# Patient Record
Sex: FEMALE | Race: WHITE | Employment: FULL TIME | URBAN - METROPOLITAN AREA
[De-identification: names, ages, dates, MRNs, and addresses within clinical notes are randomized per-mention and may not be internally consistent; named-entity substitution may affect disease eponyms.]

---

## 2017-01-10 RX ORDER — QUETIAPINE FUMARATE 100 MG/1
TABLET, FILM COATED ORAL
Qty: 30 TABLET | Refills: 0 | Status: SHIPPED | OUTPATIENT
Start: 2017-01-10 | End: 2017-03-22 | Stop reason: ALTCHOICE

## 2017-01-31 ENCOUNTER — OFFICE VISIT (OUTPATIENT)
Dept: FAMILY MEDICINE CLINIC | Age: 47
End: 2017-01-31

## 2017-01-31 VITALS
BODY MASS INDEX: 31.95 KG/M2 | DIASTOLIC BLOOD PRESSURE: 82 MMHG | RESPIRATION RATE: 12 BRPM | SYSTOLIC BLOOD PRESSURE: 120 MMHG | HEART RATE: 82 BPM | WEIGHT: 204 LBS

## 2017-01-31 DIAGNOSIS — E66.9 OBESITY (BMI 30.0-34.9): ICD-10-CM

## 2017-01-31 DIAGNOSIS — G47.33 OBSTRUCTIVE SLEEP APNEA SYNDROME: Chronic | ICD-10-CM

## 2017-01-31 DIAGNOSIS — F41.9 ANXIETY: Chronic | ICD-10-CM

## 2017-01-31 PROCEDURE — 99214 OFFICE O/P EST MOD 30 MIN: CPT | Performed by: NURSE PRACTITIONER

## 2017-01-31 ASSESSMENT — ENCOUNTER SYMPTOMS
COUGH: 0
SHORTNESS OF BREATH: 0
DIARRHEA: 0
VOMITING: 0
NAUSEA: 0

## 2017-02-01 DIAGNOSIS — D64.9 ANEMIA, UNSPECIFIED TYPE: Primary | ICD-10-CM

## 2017-02-01 DIAGNOSIS — E66.9 OBESITY (BMI 30.0-34.9): ICD-10-CM

## 2017-02-01 LAB
A/G RATIO: 1.1 (ref 1.1–2.2)
ALBUMIN SERPL-MCNC: 3.9 G/DL (ref 3.4–5)
ALP BLD-CCNC: 96 U/L (ref 40–129)
ALT SERPL-CCNC: 15 U/L (ref 10–40)
ANION GAP SERPL CALCULATED.3IONS-SCNC: 13 MMOL/L (ref 3–16)
AST SERPL-CCNC: 15 U/L (ref 15–37)
BASOPHILS ABSOLUTE: 0 K/UL (ref 0–0.2)
BASOPHILS RELATIVE PERCENT: 0.6 %
BILIRUB SERPL-MCNC: 0.5 MG/DL (ref 0–1)
BUN BLDV-MCNC: 21 MG/DL (ref 7–20)
CALCIUM SERPL-MCNC: 9.2 MG/DL (ref 8.3–10.6)
CHLORIDE BLD-SCNC: 105 MMOL/L (ref 99–110)
CHOLESTEROL, TOTAL: 158 MG/DL (ref 0–199)
CO2: 26 MMOL/L (ref 21–32)
CREAT SERPL-MCNC: 0.7 MG/DL (ref 0.6–1.1)
EOSINOPHILS ABSOLUTE: 0.1 K/UL (ref 0–0.6)
EOSINOPHILS RELATIVE PERCENT: 1.9 %
FERRITIN: 9.4 NG/ML (ref 15–150)
FOLATE: 17.31 NG/ML (ref 4.78–24.2)
GFR AFRICAN AMERICAN: >60
GFR NON-AFRICAN AMERICAN: >60
GLOBULIN: 3.4 G/DL
GLUCOSE BLD-MCNC: 102 MG/DL (ref 70–99)
HCT VFR BLD CALC: 35.9 % (ref 36–48)
HDLC SERPL-MCNC: 59 MG/DL (ref 40–60)
HEMOGLOBIN: 11.1 G/DL (ref 12–16)
IRON SATURATION: 9 % (ref 15–50)
IRON: 33 UG/DL (ref 37–145)
LDL CHOLESTEROL CALCULATED: 90 MG/DL
LYMPHOCYTES ABSOLUTE: 2.1 K/UL (ref 1–5.1)
LYMPHOCYTES RELATIVE PERCENT: 33.2 %
MCH RBC QN AUTO: 23.6 PG (ref 26–34)
MCHC RBC AUTO-ENTMCNC: 31 G/DL (ref 31–36)
MCV RBC AUTO: 76.1 FL (ref 80–100)
MONOCYTES ABSOLUTE: 0.7 K/UL (ref 0–1.3)
MONOCYTES RELATIVE PERCENT: 10.6 %
NEUTROPHILS ABSOLUTE: 3.4 K/UL (ref 1.7–7.7)
NEUTROPHILS RELATIVE PERCENT: 53.7 %
PDW BLD-RTO: 19.7 % (ref 12.4–15.4)
PLATELET # BLD: 222 K/UL (ref 135–450)
PMV BLD AUTO: 9.9 FL (ref 5–10.5)
POTASSIUM SERPL-SCNC: 4.5 MMOL/L (ref 3.5–5.1)
RBC # BLD: 4.72 M/UL (ref 4–5.2)
SODIUM BLD-SCNC: 144 MMOL/L (ref 136–145)
TOTAL IRON BINDING CAPACITY: 367 UG/DL (ref 260–445)
TOTAL PROTEIN: 7.3 G/DL (ref 6.4–8.2)
TRIGL SERPL-MCNC: 44 MG/DL (ref 0–150)
TSH REFLEX: 1.2 UIU/ML (ref 0.27–4.2)
VITAMIN B-12: 484 PG/ML (ref 211–911)
VLDLC SERPL CALC-MCNC: 9 MG/DL
WBC # BLD: 6.4 K/UL (ref 4–11)

## 2017-02-02 DIAGNOSIS — D50.9 IRON DEFICIENCY ANEMIA, UNSPECIFIED IRON DEFICIENCY ANEMIA TYPE: Primary | ICD-10-CM

## 2017-02-02 RX ORDER — LANOLIN ALCOHOL/MO/W.PET/CERES
325 CREAM (GRAM) TOPICAL
Qty: 30 TABLET | Refills: 1 | Status: SHIPPED | OUTPATIENT
Start: 2017-02-02 | End: 2017-02-08 | Stop reason: SDUPTHER

## 2017-02-08 ENCOUNTER — OFFICE VISIT (OUTPATIENT)
Dept: FAMILY MEDICINE CLINIC | Age: 47
End: 2017-02-08

## 2017-02-08 VITALS
TEMPERATURE: 97.7 F | DIASTOLIC BLOOD PRESSURE: 66 MMHG | RESPIRATION RATE: 16 BRPM | HEIGHT: 67 IN | WEIGHT: 203 LBS | SYSTOLIC BLOOD PRESSURE: 114 MMHG | HEART RATE: 76 BPM | BODY MASS INDEX: 31.86 KG/M2

## 2017-02-08 DIAGNOSIS — Z13.220 SCREENING, LIPID: ICD-10-CM

## 2017-02-08 DIAGNOSIS — D50.9 IRON DEFICIENCY ANEMIA, UNSPECIFIED IRON DEFICIENCY ANEMIA TYPE: Primary | ICD-10-CM

## 2017-02-08 DIAGNOSIS — E04.9 GOITER: ICD-10-CM

## 2017-02-08 PROCEDURE — 99215 OFFICE O/P EST HI 40 MIN: CPT | Performed by: INTERNAL MEDICINE

## 2017-02-08 RX ORDER — LANOLIN ALCOHOL/MO/W.PET/CERES
325 CREAM (GRAM) TOPICAL 2 TIMES DAILY WITH MEALS
Qty: 30 TABLET | Refills: 1 | Status: SHIPPED | OUTPATIENT
Start: 2017-02-08 | End: 2017-06-21

## 2017-02-08 ASSESSMENT — ENCOUNTER SYMPTOMS
DIARRHEA: 0
WHEEZING: 0
ABDOMINAL PAIN: 1
SHORTNESS OF BREATH: 0
CONSTIPATION: 0

## 2017-02-15 ENCOUNTER — TELEPHONE (OUTPATIENT)
Dept: FAMILY MEDICINE CLINIC | Age: 47
End: 2017-02-15

## 2017-02-15 ENCOUNTER — HOSPITAL ENCOUNTER (OUTPATIENT)
Dept: ULTRASOUND IMAGING | Age: 47
Discharge: HOME | End: 2017-02-15
Attending: INTERNAL MEDICINE | Admitting: INTERNAL MEDICINE

## 2017-02-15 DIAGNOSIS — E04.1 THYROID NODULE: Primary | ICD-10-CM

## 2017-02-15 DIAGNOSIS — E04.9 NONTOXIC GOITER: ICD-10-CM

## 2017-02-15 DIAGNOSIS — E04.9 GOITER: ICD-10-CM

## 2017-02-27 ENCOUNTER — OFFICE VISIT (OUTPATIENT)
Dept: FAMILY MEDICINE CLINIC | Age: 47
End: 2017-02-27

## 2017-02-27 VITALS
TEMPERATURE: 97.6 F | HEIGHT: 67 IN | DIASTOLIC BLOOD PRESSURE: 66 MMHG | WEIGHT: 201 LBS | BODY MASS INDEX: 31.55 KG/M2 | SYSTOLIC BLOOD PRESSURE: 102 MMHG | HEART RATE: 64 BPM | RESPIRATION RATE: 16 BRPM

## 2017-02-27 DIAGNOSIS — E16.2 HYPOGLYCEMIA: ICD-10-CM

## 2017-02-27 DIAGNOSIS — R07.9 CHEST PAIN, UNSPECIFIED TYPE: Primary | ICD-10-CM

## 2017-02-27 DIAGNOSIS — R07.9 CHEST PAIN, UNSPECIFIED TYPE: ICD-10-CM

## 2017-02-27 DIAGNOSIS — R07.82 INTERCOSTAL PAIN: ICD-10-CM

## 2017-02-27 LAB — TROPONIN: <0.01 NG/ML

## 2017-02-27 PROCEDURE — 99214 OFFICE O/P EST MOD 30 MIN: CPT | Performed by: INTERNAL MEDICINE

## 2017-02-27 PROCEDURE — 93000 ELECTROCARDIOGRAM COMPLETE: CPT | Performed by: INTERNAL MEDICINE

## 2017-02-27 ASSESSMENT — ENCOUNTER SYMPTOMS
CONSTIPATION: 1
SHORTNESS OF BREATH: 0
DIARRHEA: 0
WHEEZING: 0

## 2017-02-28 ENCOUNTER — HOSPITAL ENCOUNTER (OUTPATIENT)
Dept: NON INVASIVE DIAGNOSTICS | Age: 47
Discharge: HOME | End: 2017-02-28
Attending: INTERNAL MEDICINE | Admitting: INTERNAL MEDICINE

## 2017-02-28 DIAGNOSIS — R07.9 CHEST PAIN, UNSPECIFIED TYPE: Primary | ICD-10-CM

## 2017-02-28 DIAGNOSIS — R07.9 CHEST PAIN: ICD-10-CM

## 2017-03-15 ENCOUNTER — OFFICE VISIT (OUTPATIENT)
Dept: FAMILY MEDICINE CLINIC | Age: 47
End: 2017-03-15

## 2017-03-15 VITALS
RESPIRATION RATE: 16 BRPM | WEIGHT: 205 LBS | BODY MASS INDEX: 32.18 KG/M2 | TEMPERATURE: 97.4 F | HEIGHT: 67 IN | HEART RATE: 68 BPM | SYSTOLIC BLOOD PRESSURE: 120 MMHG | DIASTOLIC BLOOD PRESSURE: 78 MMHG

## 2017-03-15 DIAGNOSIS — K63.5 COLON POLYP: ICD-10-CM

## 2017-03-15 DIAGNOSIS — Z13.220 SCREENING, LIPID: ICD-10-CM

## 2017-03-15 DIAGNOSIS — F41.9 ANXIETY: Chronic | ICD-10-CM

## 2017-03-15 DIAGNOSIS — Z00.00 PE (PHYSICAL EXAM), ANNUAL: Primary | ICD-10-CM

## 2017-03-15 DIAGNOSIS — D50.9 IRON DEFICIENCY ANEMIA, UNSPECIFIED IRON DEFICIENCY ANEMIA TYPE: ICD-10-CM

## 2017-03-15 LAB
BASOPHILS ABSOLUTE: 0 K/UL (ref 0–0.2)
BASOPHILS RELATIVE PERCENT: 0.4 %
CHOLESTEROL, TOTAL: 175 MG/DL (ref 0–199)
EOSINOPHILS ABSOLUTE: 0.1 K/UL (ref 0–0.6)
EOSINOPHILS RELATIVE PERCENT: 1.3 %
FERRITIN: 29.4 NG/ML (ref 15–150)
HCT VFR BLD CALC: 39.1 % (ref 36–48)
HDLC SERPL-MCNC: 62 MG/DL (ref 40–60)
HEMOGLOBIN: 12.1 G/DL (ref 12–16)
LDL CHOLESTEROL CALCULATED: 103 MG/DL
LYMPHOCYTES ABSOLUTE: 1.9 K/UL (ref 1–5.1)
LYMPHOCYTES RELATIVE PERCENT: 25.7 %
MCH RBC QN AUTO: 24.5 PG (ref 26–34)
MCHC RBC AUTO-ENTMCNC: 30.9 G/DL (ref 31–36)
MCV RBC AUTO: 79.5 FL (ref 80–100)
MONOCYTES ABSOLUTE: 0.7 K/UL (ref 0–1.3)
MONOCYTES RELATIVE PERCENT: 9.4 %
NEUTROPHILS ABSOLUTE: 4.6 K/UL (ref 1.7–7.7)
NEUTROPHILS RELATIVE PERCENT: 63.2 %
PDW BLD-RTO: 23.6 % (ref 12.4–15.4)
PLATELET # BLD: 203 K/UL (ref 135–450)
PMV BLD AUTO: 10.5 FL (ref 5–10.5)
RBC # BLD: 4.91 M/UL (ref 4–5.2)
TRIGL SERPL-MCNC: 52 MG/DL (ref 0–150)
VLDLC SERPL CALC-MCNC: 10 MG/DL
WBC # BLD: 7.3 K/UL (ref 4–11)

## 2017-03-15 PROCEDURE — 99396 PREV VISIT EST AGE 40-64: CPT | Performed by: INTERNAL MEDICINE

## 2017-03-15 ASSESSMENT — ENCOUNTER SYMPTOMS
NAUSEA: 0
EYE PAIN: 0
WHEEZING: 0
VOMITING: 0
DIARRHEA: 0
SHORTNESS OF BREATH: 1
COLOR CHANGE: 0
CONSTIPATION: 0

## 2017-03-22 ENCOUNTER — OFFICE VISIT (OUTPATIENT)
Dept: ENDOCRINOLOGY | Age: 47
End: 2017-03-22

## 2017-03-22 VITALS
HEART RATE: 57 BPM | TEMPERATURE: 97.5 F | SYSTOLIC BLOOD PRESSURE: 91 MMHG | WEIGHT: 206.8 LBS | RESPIRATION RATE: 16 BRPM | BODY MASS INDEX: 33.23 KG/M2 | HEIGHT: 66 IN | DIASTOLIC BLOOD PRESSURE: 59 MMHG | OXYGEN SATURATION: 98 %

## 2017-03-22 DIAGNOSIS — E04.1 THYROID NODULE: ICD-10-CM

## 2017-03-22 LAB
ANTI-THYROGLOB ABS: 14 IU/ML
THYROID PEROXIDASE (TPO) ABS: 16 IU/ML
TSH REFLEX: 0.78 UIU/ML (ref 0.27–4.2)

## 2017-03-22 PROCEDURE — 99204 OFFICE O/P NEW MOD 45 MIN: CPT | Performed by: INTERNAL MEDICINE

## 2017-03-22 ASSESSMENT — ENCOUNTER SYMPTOMS
NAUSEA: 0
DOUBLE VISION: 0
SORE THROAT: 0
SHORTNESS OF BREATH: 1
CONSTIPATION: 1
ABDOMINAL PAIN: 0
DIARRHEA: 0
BLURRED VISION: 0
VOMITING: 0

## 2017-03-29 ENCOUNTER — TELEPHONE (OUTPATIENT)
Dept: FAMILY MEDICINE CLINIC | Age: 47
End: 2017-03-29

## 2017-03-29 ENCOUNTER — OFFICE VISIT (OUTPATIENT)
Dept: CARDIOLOGY CLINIC | Age: 47
End: 2017-03-29

## 2017-03-29 VITALS
OXYGEN SATURATION: 99 % | DIASTOLIC BLOOD PRESSURE: 70 MMHG | BODY MASS INDEX: 33.24 KG/M2 | SYSTOLIC BLOOD PRESSURE: 104 MMHG | HEART RATE: 72 BPM | WEIGHT: 211.8 LBS | HEIGHT: 67 IN

## 2017-03-29 DIAGNOSIS — R07.9 CHEST PAIN, UNSPECIFIED TYPE: Primary | ICD-10-CM

## 2017-03-29 PROCEDURE — 99204 OFFICE O/P NEW MOD 45 MIN: CPT | Performed by: INTERNAL MEDICINE

## 2017-03-29 PROCEDURE — 93000 ELECTROCARDIOGRAM COMPLETE: CPT | Performed by: INTERNAL MEDICINE

## 2017-03-29 RX ORDER — METHOCARBAMOL 750 MG/1
750 TABLET, FILM COATED ORAL 3 TIMES DAILY
COMMUNITY
End: 2017-04-28 | Stop reason: ALTCHOICE

## 2017-03-29 RX ORDER — NAPROXEN 500 MG/1
500 TABLET ORAL 2 TIMES DAILY WITH MEALS
COMMUNITY
End: 2017-04-28 | Stop reason: ALTCHOICE

## 2017-04-19 ENCOUNTER — OFFICE VISIT (OUTPATIENT)
Dept: ORTHOPEDIC SURGERY | Age: 47
End: 2017-04-19

## 2017-04-19 VITALS
HEART RATE: 68 BPM | BODY MASS INDEX: 33.12 KG/M2 | DIASTOLIC BLOOD PRESSURE: 85 MMHG | SYSTOLIC BLOOD PRESSURE: 131 MMHG | HEIGHT: 67 IN | WEIGHT: 211 LBS

## 2017-04-19 DIAGNOSIS — M54.5 LOW BACK PAIN, UNSPECIFIED BACK PAIN LATERALITY, UNSPECIFIED CHRONICITY, WITH SCIATICA PRESENCE UNSPECIFIED: Primary | ICD-10-CM

## 2017-04-19 DIAGNOSIS — M51.36 DDD (DEGENERATIVE DISC DISEASE), LUMBAR: ICD-10-CM

## 2017-04-19 DIAGNOSIS — S39.012A LUMBAR STRAIN, INITIAL ENCOUNTER: ICD-10-CM

## 2017-04-19 PROCEDURE — 99214 OFFICE O/P EST MOD 30 MIN: CPT | Performed by: NURSE PRACTITIONER

## 2017-04-19 PROCEDURE — 72100 X-RAY EXAM L-S SPINE 2/3 VWS: CPT | Performed by: NURSE PRACTITIONER

## 2017-04-19 RX ORDER — METHYLPREDNISOLONE 4 MG/1
TABLET ORAL
Qty: 1 KIT | Refills: 0 | Status: SHIPPED | OUTPATIENT
Start: 2017-04-19 | End: 2017-04-28 | Stop reason: ALTCHOICE

## 2017-04-25 ENCOUNTER — OFFICE VISIT (OUTPATIENT)
Dept: ENDOCRINOLOGY | Age: 47
End: 2017-04-25

## 2017-04-25 VITALS
OXYGEN SATURATION: 98 % | WEIGHT: 208.2 LBS | RESPIRATION RATE: 16 BRPM | HEIGHT: 67 IN | DIASTOLIC BLOOD PRESSURE: 81 MMHG | SYSTOLIC BLOOD PRESSURE: 117 MMHG | BODY MASS INDEX: 32.68 KG/M2 | HEART RATE: 71 BPM

## 2017-04-25 DIAGNOSIS — E04.1 THYROID NODULE: Primary | ICD-10-CM

## 2017-04-25 PROCEDURE — 10022 PR FINE NEEDLE ASP;W/IMAGING GUIDANCE: CPT | Performed by: INTERNAL MEDICINE

## 2017-04-28 ENCOUNTER — TELEPHONE (OUTPATIENT)
Dept: ENDOCRINOLOGY | Age: 47
End: 2017-04-28

## 2017-04-28 ENCOUNTER — HOSPITAL ENCOUNTER (OUTPATIENT)
Dept: OTHER | Age: 47
Discharge: HOME | End: 2017-04-28
Attending: INTERNAL MEDICINE | Admitting: INTERNAL MEDICINE

## 2017-04-28 ENCOUNTER — OFFICE VISIT (OUTPATIENT)
Dept: FAMILY MEDICINE CLINIC | Age: 47
End: 2017-04-28

## 2017-04-28 VITALS
DIASTOLIC BLOOD PRESSURE: 80 MMHG | HEIGHT: 67 IN | RESPIRATION RATE: 16 BRPM | HEART RATE: 66 BPM | TEMPERATURE: 98.5 F | BODY MASS INDEX: 32.49 KG/M2 | SYSTOLIC BLOOD PRESSURE: 122 MMHG | WEIGHT: 207 LBS | OXYGEN SATURATION: 98 %

## 2017-04-28 DIAGNOSIS — S16.1XXA NECK STRAIN, INITIAL ENCOUNTER: Primary | ICD-10-CM

## 2017-04-28 DIAGNOSIS — S16.1XXA NECK STRAIN, INITIAL ENCOUNTER: ICD-10-CM

## 2017-04-28 DIAGNOSIS — D22.9 ATYPICAL NEVI: ICD-10-CM

## 2017-04-28 PROCEDURE — 99214 OFFICE O/P EST MOD 30 MIN: CPT | Performed by: INTERNAL MEDICINE

## 2017-04-28 RX ORDER — PREDNISONE 10 MG/1
TABLET ORAL
Qty: 34 TABLET | Refills: 0 | Status: SHIPPED | OUTPATIENT
Start: 2017-04-28 | End: 2017-05-10

## 2017-04-28 ASSESSMENT — ENCOUNTER SYMPTOMS
CONSTIPATION: 0
DIARRHEA: 0
WHEEZING: 0
SHORTNESS OF BREATH: 0

## 2017-05-05 ENCOUNTER — HOSPITAL ENCOUNTER (OUTPATIENT)
Dept: OTHER | Age: 47
Discharge: HOME | End: 2017-05-31
Attending: NURSE PRACTITIONER | Admitting: NURSE PRACTITIONER

## 2017-05-11 ENCOUNTER — HOSPITAL ENCOUNTER (OUTPATIENT)
Dept: PHYSICAL THERAPY | Age: 47
Discharge: HOME | End: 2017-05-12
Admitting: NURSE PRACTITIONER

## 2017-05-12 ENCOUNTER — HOSPITAL ENCOUNTER (OUTPATIENT)
Dept: PHYSICAL THERAPY | Age: 47
Discharge: HOME | End: 2017-05-13
Admitting: NURSE PRACTITIONER

## 2017-05-15 ENCOUNTER — HOSPITAL ENCOUNTER (OUTPATIENT)
Dept: PHYSICAL THERAPY | Age: 47
Discharge: HOME | End: 2017-05-16
Admitting: NURSE PRACTITIONER

## 2017-05-17 ENCOUNTER — HOSPITAL ENCOUNTER (OUTPATIENT)
Dept: PHYSICAL THERAPY | Age: 47
Discharge: HOME | End: 2017-05-18
Admitting: NURSE PRACTITIONER

## 2017-05-22 ENCOUNTER — HOSPITAL ENCOUNTER (OUTPATIENT)
Dept: PHYSICAL THERAPY | Age: 47
Discharge: HOME | End: 2017-05-23
Admitting: NURSE PRACTITIONER

## 2017-05-24 ENCOUNTER — HOSPITAL ENCOUNTER (OUTPATIENT)
Dept: PHYSICAL THERAPY | Age: 47
Discharge: HOME | End: 2017-05-25
Admitting: NURSE PRACTITIONER

## 2017-05-31 ENCOUNTER — HOSPITAL ENCOUNTER (OUTPATIENT)
Dept: PHYSICAL THERAPY | Age: 47
Discharge: HOME | End: 2017-06-01
Admitting: NURSE PRACTITIONER

## 2017-06-07 ENCOUNTER — OFFICE VISIT (OUTPATIENT)
Dept: ORTHOPEDIC SURGERY | Age: 47
End: 2017-06-07

## 2017-06-07 VITALS — HEIGHT: 67 IN | BODY MASS INDEX: 32.49 KG/M2 | WEIGHT: 207.01 LBS

## 2017-06-07 DIAGNOSIS — M51.36 DDD (DEGENERATIVE DISC DISEASE), LUMBAR: Primary | ICD-10-CM

## 2017-06-07 PROCEDURE — 99213 OFFICE O/P EST LOW 20 MIN: CPT | Performed by: NURSE PRACTITIONER

## 2017-06-07 RX ORDER — IBUPROFEN 600 MG/1
600 TABLET ORAL EVERY 6 HOURS PRN
Qty: 60 TABLET | Refills: 0 | Status: SHIPPED | OUTPATIENT
Start: 2017-06-07 | End: 2018-06-21

## 2017-06-19 ENCOUNTER — HOSPITAL ENCOUNTER (OUTPATIENT)
Dept: MRI IMAGING | Age: 47
Discharge: HOME | End: 2017-06-19
Attending: NURSE PRACTITIONER | Admitting: NURSE PRACTITIONER

## 2017-06-19 DIAGNOSIS — M51.36 OTHER INTERVERTEBRAL DISC DEGENERATION, LUMBAR REGION: ICD-10-CM

## 2017-06-19 DIAGNOSIS — M51.36 DDD (DEGENERATIVE DISC DISEASE), LUMBAR: ICD-10-CM

## 2017-06-21 ENCOUNTER — OFFICE VISIT (OUTPATIENT)
Dept: FAMILY MEDICINE CLINIC | Age: 47
End: 2017-06-21

## 2017-06-21 VITALS
DIASTOLIC BLOOD PRESSURE: 82 MMHG | SYSTOLIC BLOOD PRESSURE: 124 MMHG | WEIGHT: 210 LBS | OXYGEN SATURATION: 98 % | HEART RATE: 70 BPM | TEMPERATURE: 97.7 F | HEIGHT: 67 IN | RESPIRATION RATE: 16 BRPM | BODY MASS INDEX: 32.96 KG/M2

## 2017-06-21 DIAGNOSIS — E61.1 IRON DEFICIENCY: ICD-10-CM

## 2017-06-21 DIAGNOSIS — B35.3 TINEA PEDIS OF BOTH FEET: Primary | ICD-10-CM

## 2017-06-21 DIAGNOSIS — R68.89 ITCHY EYES: ICD-10-CM

## 2017-06-21 DIAGNOSIS — R63.5 WEIGHT GAIN: ICD-10-CM

## 2017-06-21 LAB
BASOPHILS ABSOLUTE: 0 K/UL (ref 0–0.2)
BASOPHILS RELATIVE PERCENT: 0.5 %
EOSINOPHILS ABSOLUTE: 0.1 K/UL (ref 0–0.6)
EOSINOPHILS RELATIVE PERCENT: 1 %
FERRITIN: 49.9 NG/ML (ref 15–150)
HCT VFR BLD CALC: 39.8 % (ref 36–48)
HEMOGLOBIN: 12.7 G/DL (ref 12–16)
IRON SATURATION: 18 % (ref 15–50)
IRON: 60 UG/DL (ref 37–145)
LYMPHOCYTES ABSOLUTE: 2.1 K/UL (ref 1–5.1)
LYMPHOCYTES RELATIVE PERCENT: 24.5 %
MCH RBC QN AUTO: 27.8 PG (ref 26–34)
MCHC RBC AUTO-ENTMCNC: 31.9 G/DL (ref 31–36)
MCV RBC AUTO: 87.2 FL (ref 80–100)
MONOCYTES ABSOLUTE: 0.8 K/UL (ref 0–1.3)
MONOCYTES RELATIVE PERCENT: 9.5 %
NEUTROPHILS ABSOLUTE: 5.4 K/UL (ref 1.7–7.7)
NEUTROPHILS RELATIVE PERCENT: 64.5 %
PDW BLD-RTO: 16.1 % (ref 12.4–15.4)
PLATELET # BLD: 194 K/UL (ref 135–450)
PMV BLD AUTO: 10.9 FL (ref 5–10.5)
RBC # BLD: 4.56 M/UL (ref 4–5.2)
TOTAL IRON BINDING CAPACITY: 328 UG/DL (ref 260–445)
WBC # BLD: 8.4 K/UL (ref 4–11)

## 2017-06-21 PROCEDURE — 99214 OFFICE O/P EST MOD 30 MIN: CPT | Performed by: INTERNAL MEDICINE

## 2017-06-21 RX ORDER — PRENATAL VIT 91/IRON/FOLIC/DHA 28-975-200
COMBINATION PACKAGE (EA) ORAL
Qty: 1 TUBE | Refills: 1 | Status: SHIPPED | OUTPATIENT
Start: 2017-06-21 | End: 2018-10-18 | Stop reason: ALTCHOICE

## 2017-06-21 ASSESSMENT — ENCOUNTER SYMPTOMS
WHEEZING: 0
NAUSEA: 0
DIARRHEA: 0
SHORTNESS OF BREATH: 0
VOMITING: 0
COLOR CHANGE: 1

## 2017-06-23 ENCOUNTER — OFFICE VISIT (OUTPATIENT)
Dept: ORTHOPEDIC SURGERY | Age: 47
End: 2017-06-23

## 2017-06-23 VITALS
SYSTOLIC BLOOD PRESSURE: 111 MMHG | WEIGHT: 207 LBS | HEART RATE: 68 BPM | DIASTOLIC BLOOD PRESSURE: 77 MMHG | HEIGHT: 66 IN | BODY MASS INDEX: 33.27 KG/M2

## 2017-06-23 DIAGNOSIS — M51.36 ANNULAR TEAR OF LUMBAR DISC: Primary | ICD-10-CM

## 2017-06-23 PROCEDURE — 99214 OFFICE O/P EST MOD 30 MIN: CPT | Performed by: NURSE PRACTITIONER

## 2017-08-02 ENCOUNTER — OFFICE VISIT (OUTPATIENT)
Dept: FAMILY MEDICINE CLINIC | Age: 47
End: 2017-08-02

## 2017-08-02 VITALS
HEIGHT: 66 IN | BODY MASS INDEX: 34.23 KG/M2 | SYSTOLIC BLOOD PRESSURE: 110 MMHG | RESPIRATION RATE: 16 BRPM | HEART RATE: 66 BPM | WEIGHT: 213 LBS | OXYGEN SATURATION: 98 % | DIASTOLIC BLOOD PRESSURE: 70 MMHG | TEMPERATURE: 98.5 F

## 2017-08-02 DIAGNOSIS — R63.5 WEIGHT GAIN: ICD-10-CM

## 2017-08-02 PROCEDURE — 99213 OFFICE O/P EST LOW 20 MIN: CPT | Performed by: INTERNAL MEDICINE

## 2017-08-02 ASSESSMENT — ENCOUNTER SYMPTOMS
DIARRHEA: 0
WHEEZING: 0
COUGH: 1
SHORTNESS OF BREATH: 0
CONSTIPATION: 0

## 2018-03-05 ENCOUNTER — TELEPHONE (OUTPATIENT)
Dept: SLEEP MEDICINE | Age: 48
End: 2018-03-05

## 2018-03-05 NOTE — TELEPHONE ENCOUNTER
I returned 140 Farzana Spencer phone call and left a message on their voicemail to call the office back at their earliest convenience to reschedule.

## 2018-03-14 ENCOUNTER — HOSPITAL ENCOUNTER (OUTPATIENT)
Dept: OTHER | Age: 48
Discharge: HOME | End: 2018-03-14
Attending: INTERNAL MEDICINE | Admitting: INTERNAL MEDICINE

## 2018-03-14 ENCOUNTER — OFFICE VISIT (OUTPATIENT)
Dept: FAMILY MEDICINE CLINIC | Age: 48
End: 2018-03-14

## 2018-03-14 VITALS
HEART RATE: 70 BPM | SYSTOLIC BLOOD PRESSURE: 120 MMHG | OXYGEN SATURATION: 96 % | HEIGHT: 66 IN | BODY MASS INDEX: 35.36 KG/M2 | WEIGHT: 220 LBS | RESPIRATION RATE: 16 BRPM | DIASTOLIC BLOOD PRESSURE: 80 MMHG

## 2018-03-14 DIAGNOSIS — R05.9 COUGH: Primary | ICD-10-CM

## 2018-03-14 DIAGNOSIS — R05.9 COUGH: ICD-10-CM

## 2018-03-14 DIAGNOSIS — L98.9 SKIN LESIONS: ICD-10-CM

## 2018-03-14 PROCEDURE — G8484 FLU IMMUNIZE NO ADMIN: HCPCS | Performed by: INTERNAL MEDICINE

## 2018-03-14 PROCEDURE — 1036F TOBACCO NON-USER: CPT | Performed by: INTERNAL MEDICINE

## 2018-03-14 PROCEDURE — G8427 DOCREV CUR MEDS BY ELIG CLIN: HCPCS | Performed by: INTERNAL MEDICINE

## 2018-03-14 PROCEDURE — G8417 CALC BMI ABV UP PARAM F/U: HCPCS | Performed by: INTERNAL MEDICINE

## 2018-03-14 PROCEDURE — 99214 OFFICE O/P EST MOD 30 MIN: CPT | Performed by: INTERNAL MEDICINE

## 2018-03-14 RX ORDER — AZITHROMYCIN 250 MG/1
TABLET, FILM COATED ORAL
Qty: 1 PACKET | Refills: 0 | Status: SHIPPED | OUTPATIENT
Start: 2018-03-14 | End: 2018-03-24

## 2018-03-14 ASSESSMENT — PATIENT HEALTH QUESTIONNAIRE - PHQ9
SUM OF ALL RESPONSES TO PHQ9 QUESTIONS 1 & 2: 3
2. FEELING DOWN, DEPRESSED OR HOPELESS: 2
SUM OF ALL RESPONSES TO PHQ QUESTIONS 1-9: 3
1. LITTLE INTEREST OR PLEASURE IN DOING THINGS: 1

## 2018-03-14 ASSESSMENT — ENCOUNTER SYMPTOMS
DIARRHEA: 1
WHEEZING: 1
SHORTNESS OF BREATH: 1
CONSTIPATION: 0

## 2018-03-14 NOTE — PROGRESS NOTES
mg/dL Final     Lab Results   Component Value Date    ALT 15 02/01/2017    AST 15 02/01/2017    ALKPHOS 96 02/01/2017    BILITOT 0.5 02/01/2017      Lab Results   Component Value Date    WBC 8.4 06/21/2017    HGB 12.7 06/21/2017    HCT 39.8 06/21/2017    MCV 87.2 06/21/2017     06/21/2017     TSH (uIU/mL)   Date Value   08/05/2015 0.94     Lab Results   Component Value Date    LABA1C 5.3 08/05/2015     No results found for: PSA, PSADIA     PHYSICAL EXAM:  /80 (Site: Right Arm, Position: Sitting, Cuff Size: Large Adult)   Pulse 70   Resp 16   Ht 5' 6\" (1.676 m)   Wt 220 lb (99.8 kg)   LMP 11/02/2016   SpO2 96%   BMI 35.51 kg/m²    Physical Exam   Constitutional: She appears well-developed and well-nourished. HENT:   Head: Normocephalic and atraumatic. Cardiovascular: Normal rate and regular rhythm. No murmur heard. Pulmonary/Chest: Breath sounds normal. She has no wheezes. Abdominal: Soft. There is no tenderness. Skin: Skin is warm and dry. Several large dk nevi on trunk  One above left breast very large  One in the right groin  Nevi  On the back  (few)   Psychiatric: She has a normal mood and affect. Her behavior is normal.     Wt Readings from Last 3 Encounters:   03/14/18 220 lb (99.8 kg)   08/02/17 213 lb (96.6 kg)   06/23/17 207 lb (93.9 kg)     BP Readings from Last 3 Encounters:   03/14/18 120/80   08/02/17 110/70   06/23/17 111/77       ASSESSMENT/PLAN:  Ny was seen today for cough. Diagnoses and all orders for this visit:    Cough  -     XR CHEST STANDARD (2 VW); Future    Skin lesions  -     Amb External Referral To Dermatology    Other orders  -     azithromycin (ZITHROMAX) 250 MG tablet; Take 2 tabs (500 mg) on Day 1, and take 1 tab (250 mg) on days 2 through 5.    says that skin lesions are new over the breast and in the ing canal  Should have whole body check  Ck with derm with ins.   [facility]  Ck cxr and zpac  Call if not getting better

## 2018-04-17 ENCOUNTER — HOSPITAL ENCOUNTER (OUTPATIENT)
Dept: MAMMOGRAPHY | Age: 48
Discharge: HOME | End: 2018-04-17
Attending: OBSTETRICS & GYNECOLOGY | Admitting: OBSTETRICS & GYNECOLOGY

## 2018-04-17 DIAGNOSIS — Z12.31 VISIT FOR SCREENING MAMMOGRAM: ICD-10-CM

## 2018-04-25 ENCOUNTER — INITIAL CONSULT (OUTPATIENT)
Dept: PULMONOLOGY | Age: 48
End: 2018-04-25

## 2018-04-25 VITALS
HEART RATE: 85 BPM | OXYGEN SATURATION: 98 % | WEIGHT: 227 LBS | BODY MASS INDEX: 35.63 KG/M2 | HEIGHT: 67 IN | DIASTOLIC BLOOD PRESSURE: 63 MMHG | SYSTOLIC BLOOD PRESSURE: 112 MMHG

## 2018-04-25 DIAGNOSIS — E66.9 NON MORBID OBESITY, UNSPECIFIED OBESITY TYPE: ICD-10-CM

## 2018-04-25 DIAGNOSIS — K21.9 GASTROESOPHAGEAL REFLUX DISEASE, ESOPHAGITIS PRESENCE NOT SPECIFIED: Chronic | ICD-10-CM

## 2018-04-25 DIAGNOSIS — F41.9 ANXIETY: Chronic | ICD-10-CM

## 2018-04-25 DIAGNOSIS — G47.33 OBSTRUCTIVE APNEA: Primary | Chronic | ICD-10-CM

## 2018-04-25 PROCEDURE — G8427 DOCREV CUR MEDS BY ELIG CLIN: HCPCS | Performed by: INTERNAL MEDICINE

## 2018-04-25 PROCEDURE — 1036F TOBACCO NON-USER: CPT | Performed by: INTERNAL MEDICINE

## 2018-04-25 PROCEDURE — 99214 OFFICE O/P EST MOD 30 MIN: CPT | Performed by: INTERNAL MEDICINE

## 2018-04-25 PROCEDURE — G8417 CALC BMI ABV UP PARAM F/U: HCPCS | Performed by: INTERNAL MEDICINE

## 2018-04-25 ASSESSMENT — SLEEP AND FATIGUE QUESTIONNAIRES
HOW LIKELY ARE YOU TO NOD OFF OR FALL ASLEEP WHILE SITTING INACTIVE IN A PUBLIC PLACE: 2
HOW LIKELY ARE YOU TO NOD OFF OR FALL ASLEEP WHILE LYING DOWN TO REST IN THE AFTERNOON WHEN CIRCUMSTANCES PERMIT: 1
HOW LIKELY ARE YOU TO NOD OFF OR FALL ASLEEP WHILE SITTING QUIETLY AFTER LUNCH WITHOUT ALCOHOL: 1
ESS TOTAL SCORE: 11
HOW LIKELY ARE YOU TO NOD OFF OR FALL ASLEEP WHILE SITTING AND TALKING TO SOMEONE: 1
HOW LIKELY ARE YOU TO NOD OFF OR FALL ASLEEP IN A CAR, WHILE STOPPED FOR A FEW MINUTES IN TRAFFIC: 1
HOW LIKELY ARE YOU TO NOD OFF OR FALL ASLEEP WHILE SITTING AND READING: 2
HOW LIKELY ARE YOU TO NOD OFF OR FALL ASLEEP WHILE WATCHING TV: 2
HOW LIKELY ARE YOU TO NOD OFF OR FALL ASLEEP WHEN YOU ARE A PASSENGER IN A CAR FOR AN HOUR WITHOUT A BREAK: 1

## 2018-04-25 ASSESSMENT — ENCOUNTER SYMPTOMS
RHINORRHEA: 0
SHORTNESS OF BREATH: 0
APNEA: 0
COUGH: 0
CHOKING: 0

## 2018-06-21 ENCOUNTER — TELEPHONE (OUTPATIENT)
Dept: FAMILY MEDICINE CLINIC | Age: 48
End: 2018-06-21

## 2018-09-26 ENCOUNTER — OFFICE VISIT (OUTPATIENT)
Dept: ORTHOPEDIC SURGERY | Age: 48
End: 2018-09-26
Payer: COMMERCIAL

## 2018-09-26 VITALS
DIASTOLIC BLOOD PRESSURE: 73 MMHG | HEIGHT: 67 IN | BODY MASS INDEX: 35.63 KG/M2 | WEIGHT: 227 LBS | SYSTOLIC BLOOD PRESSURE: 118 MMHG | HEART RATE: 64 BPM

## 2018-09-26 DIAGNOSIS — M51.9 ANNULAR TEAR: ICD-10-CM

## 2018-09-26 DIAGNOSIS — M54.5 LOW BACK PAIN, UNSPECIFIED BACK PAIN LATERALITY, UNSPECIFIED CHRONICITY, WITH SCIATICA PRESENCE UNSPECIFIED: Primary | ICD-10-CM

## 2018-09-26 PROBLEM — Z00.00 PE (PHYSICAL EXAM), ANNUAL: Status: RESOLVED | Noted: 2017-03-15 | Resolved: 2018-09-26

## 2018-09-26 PROCEDURE — G8417 CALC BMI ABV UP PARAM F/U: HCPCS | Performed by: NURSE PRACTITIONER

## 2018-09-26 PROCEDURE — G8427 DOCREV CUR MEDS BY ELIG CLIN: HCPCS | Performed by: NURSE PRACTITIONER

## 2018-09-26 PROCEDURE — 1036F TOBACCO NON-USER: CPT | Performed by: NURSE PRACTITIONER

## 2018-09-26 PROCEDURE — 99213 OFFICE O/P EST LOW 20 MIN: CPT | Performed by: NURSE PRACTITIONER

## 2018-10-08 ENCOUNTER — HOSPITAL ENCOUNTER (OUTPATIENT)
Dept: PHYSICAL THERAPY | Age: 48
Setting detail: THERAPIES SERIES
Discharge: HOME | End: 2018-10-08

## 2018-10-08 PROCEDURE — 97162 PT EVAL MOD COMPLEX 30 MIN: CPT

## 2018-10-08 PROCEDURE — 97530 THERAPEUTIC ACTIVITIES: CPT

## 2018-10-08 PROCEDURE — G8979 MOBILITY GOAL STATUS: HCPCS

## 2018-10-08 PROCEDURE — G8978 MOBILITY CURRENT STATUS: HCPCS

## 2018-10-08 ASSESSMENT — PAIN DESCRIPTION - PAIN TYPE: TYPE: CHRONIC PAIN

## 2018-10-08 ASSESSMENT — PAIN SCALES - GENERAL: PAINLEVEL_OUTOF10: 7

## 2018-10-08 ASSESSMENT — PAIN DESCRIPTION - LOCATION: LOCATION: BACK

## 2018-10-08 ASSESSMENT — PAIN DESCRIPTION - ORIENTATION: ORIENTATION: LOWER

## 2018-10-08 NOTE — FLOWSHEET NOTE
Physical Therapy Daily Treatment Note    Date:  10/8/2018    Patient Name:  Jesika Hernandez    :  1970  MRN: [id number]  Restrictions/Precautions:    Medical/Treatment Diagnosis Information:  · Diagnosis: annular tear  Insurance/Certification information:  PT Insurance Information: [id number]  Physician Information:  Referring Practitioner: AMARILIS Croft CNP  Plan of care signed (Y/N): N  Visit# / total visits:       G-Code (if applicable):         PT G-Codes  Functional Assessment Tool Used: Modified Oswestry  Score: 21  Functional Limitation: Mobility: Walking and moving around  Mobility: Walking and Moving Around Current Status (): At least 40 percent but less than 60 percent impaired, limited or restricted  Mobility: Walking and Moving Around Goal Status (): At least 20 percent but less than 40 percent impaired, limited or restricted    Time in:   9:45      Timed Treatment: 10 Total Treatment Time:  55  ________________________________________________________________________________________    Pain Level:    /10  SUBJECTIVE:  See eval    OBJECTIVE:     Exercise/Equipment Resistance/Repetitions Other comments   TA set Until control demo'd HEP                                                                                                                                      Other Therapeutic Activities:  Pt educated on POC and HEP. Pt given [facility] facilities. Pt instructed in use of locker room and aquatic facilities. Also instructed in rules and regulations of pool.     Manual Treatments:         Modalities:      Test/Measurements:  See eval       ASSESSMENT:     See eval    Treatment/Activity Tolerance:   [x] Patient tolerated treatment well [] Patient limited by fatique  [] Patient limited by pain [] Patient limited by other medical complications  [] Other:     Goals:    Short term goals  Time Frame for Short term goals: 6 weeks  Short term goal 1: pt will be
pending MD visit [] Discharge    See Weekly Progress Note: [] Yes  [] No  Next due:

## 2018-10-18 ENCOUNTER — APPOINTMENT (OUTPATIENT)
Dept: CT IMAGING | Age: 48
End: 2018-10-18

## 2018-10-18 ENCOUNTER — APPOINTMENT (OUTPATIENT)
Dept: GENERAL RADIOLOGY | Age: 48
End: 2018-10-18

## 2018-10-18 ENCOUNTER — HOSPITAL ENCOUNTER (EMERGENCY)
Age: 48
Discharge: HOME | End: 2018-10-18
Attending: EMERGENCY MEDICINE

## 2018-10-18 VITALS
BODY MASS INDEX: 32.96 KG/M2 | HEIGHT: 67 IN | WEIGHT: 210 LBS | HEART RATE: 71 BPM | OXYGEN SATURATION: 95 % | TEMPERATURE: 98 F | SYSTOLIC BLOOD PRESSURE: 107 MMHG | DIASTOLIC BLOOD PRESSURE: 82 MMHG | RESPIRATION RATE: 17 BRPM
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
## 2018-10-18 DIAGNOSIS — S63.502A LEFT WRIST SPRAIN, INITIAL ENCOUNTER: ICD-10-CM

## 2018-10-18 DIAGNOSIS — T07.XXXA MULTIPLE CONTUSIONS: ICD-10-CM

## 2018-10-18 PROCEDURE — 70450 CT HEAD/BRAIN W/O DYE: CPT

## 2018-10-18 PROCEDURE — 73110 X-RAY EXAM OF WRIST: CPT

## 2018-10-18 PROCEDURE — 6360000002 HC RX W HCPCS: Performed by: EMERGENCY MEDICINE

## 2018-10-18 PROCEDURE — 72125 CT NECK SPINE W/O DYE: CPT

## 2018-10-18 PROCEDURE — 71046 X-RAY EXAM CHEST 2 VIEWS: CPT

## 2018-10-18 PROCEDURE — 99284 EMERGENCY DEPT VISIT MOD MDM: CPT

## 2018-10-18 PROCEDURE — 74176 CT ABD & PELVIS W/O CONTRAST: CPT

## 2018-10-18 PROCEDURE — 96372 THER/PROPH/DIAG INJ SC/IM: CPT

## 2018-10-18 RX ORDER — KETOROLAC TROMETHAMINE 30 MG/ML
60 INJECTION, SOLUTION INTRAMUSCULAR; INTRAVENOUS ONCE
Status: COMPLETED | OUTPATIENT
Start: 2018-10-18 | End: 2018-10-18

## 2018-10-18 RX ORDER — IBUPROFEN 600 MG/1
600 TABLET ORAL EVERY 6 HOURS PRN
Qty: 20 TABLET | Refills: 0 | Status: SHIPPED | OUTPATIENT
Start: 2018-10-18 | End: 2019-06-21 | Stop reason: ALTCHOICE

## 2018-10-18 RX ORDER — METHOCARBAMOL 750 MG/1
750 TABLET, FILM COATED ORAL 3 TIMES DAILY PRN
Qty: 15 TABLET | Refills: 0 | Status: SHIPPED | OUTPATIENT
Start: 2018-10-18 | End: 2018-10-28

## 2018-10-18 RX ADMIN — KETOROLAC TROMETHAMINE 60 MG: 60 INJECTION, SOLUTION INTRAMUSCULAR at 13:49

## 2018-10-18 ASSESSMENT — PAIN SCALES - GENERAL
PAINLEVEL_OUTOF10: 10
PAINLEVEL_OUTOF10: 6

## 2018-10-18 ASSESSMENT — PAIN DESCRIPTION - PAIN TYPE: TYPE: ACUTE PAIN

## 2018-10-18 ASSESSMENT — PAIN DESCRIPTION - DESCRIPTORS: DESCRIPTORS: CONSTANT;THROBBING

## 2018-10-18 ASSESSMENT — PAIN DESCRIPTION - ORIENTATION: ORIENTATION: LEFT;RIGHT

## 2018-10-18 NOTE — ED PROVIDER NOTES
Triage Chief Complaint:   Fall (Pt c/o pain all over, Pt fell through a set of wooden stairs this morning. Pt denioes LOC. Pt states \" I went out the first step an fell throught the stair, I got hit in the head with a ladder and my left head went through some bricks. \" Pt denies LOC)    Craig:  Mirta Mcintyre is a 56 y.o. female that presents after fall. Patient states he stepped down the wooden steps and one of the legs gave way and she fell sideways states her hand on the left side, hit some bricks and she landed on her left side. She has been ambulatory since the episode and denies any loss of consciousness. She does complain of neck pain radiating up into her head and entire left side pain.   Hysterectomy    ROS:  General:  No fevers, no chills, no weakness  Eyes:  No recent vison changes, no discharge  ENT:  No sore throat, no nasal congestion, no hearing changes  Cardiovascular:  No chest pain, no palpitations  Respiratory:  No shortness of breath, no cough, no wheezing  Gastrointestinal:  No pain, no nausea, no vomiting, no diarrhea  Musculoskeletal:  No muscle pain, no joint pain  Skin:  No rash, no pruritis, no easy bruising  Neurologic:  No speech problems, no headache, no extremity numbness, no extremity tingling, no extremity weakness  Psychiatric:  No anxiety  Genitourinary:  No dysuria, no hematuria  Endocrine:  No unexpected weight gain, no unexpected weight loss  Extremities:  no edema, no pain    Past Medical History:   Diagnosis Date    Anxiety     Chronic back pain     Headache(784.0)     Hx of blood clots     march 2016-was on xarelto for 6months    Obstructive apnea     Seizures ([facility] [address]     states no longer  has them-last one was in 2013-june     Past Surgical History:   Procedure Laterality Date    KNEE SURGERY Right 2003    RIGHT KNEE    KNEE SURGERY Left 2012    LASIK Bilateral 2004    PARTIAL HYSTERECTOMY  11.4.16    Dr. Juana Barajas History Pain Edu? Excl. in [address]? My pulse ox interpretation is - normal    General appearance:  No acute distress. Head is normocephalic without any signs of trauma  Skin:  Warm. Dry. Eye:  Extraocular movements intact. Ears, nose, mouth and throat:  Oral mucosa moist   Neck:  Trachea midline. Minimal tenderness in the posterior cervical muscles. No crepitance step-offs or deformities are appreciated. No JVD  ExtremityTrace edema and slight lead decreased range of motion secondary to pain in the left wrist.  There is no abrasion and contusion in the left lateral volar wrist.  Multiple contusions in the lower extremities of various ages No other signs of trauma or injury. Patient is ambulatory  Heart:  Regular rate and rhythm,   Perfusion:  intact  Respiratory:  Lungs clear to auscultation bilaterally. Respirations nonlabored. Abdominal:  Normal bowel sounds. Soft. Nontender. Obese  Back:  No CVA tenderness to palpation midline is not tender. No crepitance step-offs or deformities are appreciated. Full range of motion    Neurological:  Alert and oriented times 3. No focal neuro deficits. Psychiatric:  Appropriate    I have reviewed and interpreted all of the currently available lab results from this visit (if applicable):  No results found for this visit on 10/18/18. Radiographs (if obtained):  [] The following radiograph was interpreted by myself in the absence of a radiologist:   [x] Radiologist's Report Reviewed:  XR WRIST LEFT (MIN 3 VIEWS)   Final Result   No acute osseous abnormality of the left wrist.         CT Cervical Spine WO Contrast   Final Result   1. Mild C5-C6 degenerative change otherwise unremarkable appearance of the   cervical spine with no acute abnormality. 2. Incidental right thyroid nodule as measured and described above, recommend   follow-up non emergent/outpatient ultrasound per follow-up guidelines below.       RECOMMENDATIONS:   Managing Incidental

## 2018-10-25 ENCOUNTER — HOSPITAL ENCOUNTER (OUTPATIENT)
Dept: PHYSICAL THERAPY | Age: 48
Setting detail: THERAPIES SERIES
Discharge: HOME | End: 2018-10-25

## 2018-10-30 ENCOUNTER — APPOINTMENT (OUTPATIENT)
Dept: PHYSICAL THERAPY | Age: 48
End: 2018-10-30

## 2018-11-24 ENCOUNTER — HOSPITAL ENCOUNTER (EMERGENCY)
Age: 48
Discharge: HOME | End: 2018-11-25
Attending: EMERGENCY MEDICINE

## 2018-11-24 DIAGNOSIS — M79.604 RIGHT LEG PAIN: Primary | ICD-10-CM

## 2018-11-24 LAB
A/G RATIO: 1 (ref 1.1–2.2)
ALBUMIN SERPL-MCNC: 3.8 G/DL (ref 3.4–5)
ALP BLD-CCNC: 114 U/L (ref 40–129)
ALT SERPL-CCNC: 17 U/L (ref 10–40)
ANION GAP SERPL CALCULATED.3IONS-SCNC: 10 MMOL/L (ref 3–16)
AST SERPL-CCNC: 23 U/L (ref 15–37)
BASOPHILS ABSOLUTE: 0.1 K/UL (ref 0–0.2)
BASOPHILS RELATIVE PERCENT: 0.8 %
BILIRUB SERPL-MCNC: 0.3 MG/DL (ref 0–1)
BUN BLDV-MCNC: 30 MG/DL (ref 7–20)
CALCIUM SERPL-MCNC: 9.3 MG/DL (ref 8.3–10.6)
CHLORIDE BLD-SCNC: 103 MMOL/L (ref 99–110)
CO2: 27 MMOL/L (ref 21–32)
CREAT SERPL-MCNC: 0.6 MG/DL (ref 0.6–1.1)
EOSINOPHILS ABSOLUTE: 0.4 K/UL (ref 0–0.6)
EOSINOPHILS RELATIVE PERCENT: 4.1 %
GFR AFRICAN AMERICAN: >60
GFR NON-AFRICAN AMERICAN: >60
GLOBULIN: 3.8 G/DL
GLUCOSE BLD-MCNC: 105 MG/DL (ref 70–99)
HCT VFR BLD CALC: 38.5 % (ref 36–48)
HEMOGLOBIN: 12.8 G/DL (ref 12–16)
LYMPHOCYTES ABSOLUTE: 2.6 K/UL (ref 1–5.1)
LYMPHOCYTES RELATIVE PERCENT: 28.6 %
MCH RBC QN AUTO: 28.8 PG (ref 26–34)
MCHC RBC AUTO-ENTMCNC: 33.4 G/DL (ref 31–36)
MCV RBC AUTO: 86.3 FL (ref 80–100)
MONOCYTES ABSOLUTE: 0.7 K/UL (ref 0–1.3)
MONOCYTES RELATIVE PERCENT: 8.1 %
NEUTROPHILS ABSOLUTE: 5.4 K/UL (ref 1.7–7.7)
NEUTROPHILS RELATIVE PERCENT: 58.4 %
PDW BLD-RTO: 15.4 % (ref 12.4–15.4)
PLATELET # BLD: 208 K/UL (ref 135–450)
PMV BLD AUTO: 9.7 FL (ref 5–10.5)
POTASSIUM SERPL-SCNC: 4.1 MMOL/L (ref 3.5–5.1)
RBC # BLD: 4.46 M/UL (ref 4–5.2)
SODIUM BLD-SCNC: 140 MMOL/L (ref 136–145)
TOTAL PROTEIN: 7.6 G/DL (ref 6.4–8.2)
WBC # BLD: 9.1 K/UL (ref 4–11)

## 2018-11-24 PROCEDURE — 6370000000 HC RX 637 (ALT 250 FOR IP): Performed by: EMERGENCY MEDICINE

## 2018-11-24 PROCEDURE — 99283 EMERGENCY DEPT VISIT LOW MDM: CPT

## 2018-11-24 PROCEDURE — 80053 COMPREHEN METABOLIC PANEL: CPT

## 2018-11-24 PROCEDURE — 85025 COMPLETE CBC W/AUTO DIFF WBC: CPT

## 2018-11-24 RX ORDER — METHOCARBAMOL 500 MG/1
500 TABLET, FILM COATED ORAL 4 TIMES DAILY
COMMUNITY
End: 2019-03-13 | Stop reason: ALTCHOICE

## 2018-11-24 RX ADMIN — RIVAROXABAN 15 MG: 15 TABLET, FILM COATED ORAL at 23:45

## 2018-11-24 ASSESSMENT — PAIN DESCRIPTION - LOCATION: LOCATION: LEG

## 2018-11-24 ASSESSMENT — PAIN DESCRIPTION - PAIN TYPE: TYPE: ACUTE PAIN

## 2018-11-24 ASSESSMENT — PAIN SCALES - GENERAL: PAINLEVEL_OUTOF10: 8

## 2018-11-24 ASSESSMENT — PAIN DESCRIPTION - ORIENTATION: ORIENTATION: RIGHT

## 2018-11-25 ENCOUNTER — HOSPITAL ENCOUNTER (OUTPATIENT)
Age: 48
Discharge: HOME | End: 2018-11-25

## 2018-11-25 VITALS
RESPIRATION RATE: 22 BRPM | TEMPERATURE: 97.8 F | OXYGEN SATURATION: 97 % | SYSTOLIC BLOOD PRESSURE: 97 MMHG | HEART RATE: 84 BPM | HEIGHT: 67 IN | WEIGHT: 215 LBS | DIASTOLIC BLOOD PRESSURE: 72 MMHG | BODY MASS INDEX: 33.74 KG/M2

## 2018-11-25 PROCEDURE — 93971 EXTREMITY STUDY: CPT

## 2018-11-25 ASSESSMENT — ENCOUNTER SYMPTOMS
DIARRHEA: 0
SORE THROAT: 0
SHORTNESS OF BREATH: 0
RHINORRHEA: 0
STRIDOR: 0
TROUBLE SWALLOWING: 0
VOMITING: 0
CHEST TIGHTNESS: 0
WHEEZING: 0
COUGH: 0
ABDOMINAL PAIN: 0

## 2018-11-25 NOTE — ED PROVIDER NOTES
physician with the below findings:      Interpretation per the Radiologist below, if available at the time of this note:    No orders to display         ED BEDSIDE ULTRASOUND:   Performed by ED Physician - none    LABS:  Labs Reviewed   COMPREHENSIVE METABOLIC PANEL - Abnormal; Notable for the following:        Result Value    Glucose 105 (*)     BUN 30 (*)     Albumin/Globulin Ratio 1.0 (*)     All other components within normal limits    Narrative:     Performed at:  [facility] [address],  [place], [facility]   Phone [phone]   CBC WITH AUTO DIFFERENTIAL    Narrative:     Performed at:  [facility] [address],  [place], [facility]   Phone [phone]       All other labs were within normal range ornot returned as of this dictation. EMERGENCY DEPARTMENT COURSE and DIFFERENTIAL DIAGNOSIS/MDM:   Vitals:    Vitals:    11/24/18 2241 11/24/18 2301 11/24/18 2331 11/25/18 0002   BP: 111/66 (!) 98/54 102/88 97/72   Pulse: 82 82 80 84   Resp: 25 19 26 22   Temp:       TempSrc:       SpO2: 95% 96% 99% 97%   Weight:       Height:             MDM    ED COURSE/MDM    -Marilu Ferris is a 53 y.o. female witHistory as noted above presents to ED for right eye pain and concerns of a repeat DVT. She was on Cymbalta for 6 months but is no longer.    -Well's score: 1  -Workup negative for any acute findings.  -As DVT ultrasound not available at this time will anticoagulate with 1 dose of xarelto and patient to follow-up tomorrow for DVT scan.  -Discussed plan to discharge with follow-up scan which will be relayed to ER physician who will call. Scan is positive and will call for further prescription for anticoagulation.    -Patient reports she is she does not have any health insurance and may not be able to afford Xarelto.   Patient was given trial booklet for Eliquis and if positive for DVT then to alert prescribing physician for preference

## 2018-12-11 ENCOUNTER — TELEPHONE (OUTPATIENT)
Dept: FAMILY MEDICINE CLINIC | Age: 48
End: 2018-12-11

## 2018-12-14 ENCOUNTER — OFFICE VISIT (OUTPATIENT)
Dept: FAMILY MEDICINE CLINIC | Age: 48
End: 2018-12-14
Payer: MEDICAID

## 2018-12-14 VITALS
SYSTOLIC BLOOD PRESSURE: 110 MMHG | WEIGHT: 216 LBS | BODY MASS INDEX: 33.83 KG/M2 | DIASTOLIC BLOOD PRESSURE: 70 MMHG | RESPIRATION RATE: 18 BRPM | HEART RATE: 82 BPM

## 2018-12-14 DIAGNOSIS — Z86.59 HISTORY OF ANXIETY: ICD-10-CM

## 2018-12-14 DIAGNOSIS — R56.9 PSEUDOSEIZURES (HCC): Primary | ICD-10-CM

## 2018-12-14 PROCEDURE — 99214 OFFICE O/P EST MOD 30 MIN: CPT | Performed by: NURSE PRACTITIONER

## 2018-12-14 NOTE — PROGRESS NOTES
12/14/2018    This is a 53 y.o. female   Chief Complaint   Patient presents with    Other     needs clearnace for work, has history/epilepsy   . HPI  Patient reports that she is going to start a new job at [employer] in [place] work. Needs clearance before she is able to start working due to her history of epilepsy. Reports that she was dx at age 12 with epilepsy. As an adult the only medication was depakote. Was on Depakote in 2014. Has seen neurologist at [facility]. Last seen in 2014. Reports that her last seizure was in 2013. Last seen Dr. Vipin Scott 5/14. 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 She was taken off depakote at that time due to lack of efficacy with pseudoseizures. In 2008 had EEG that revealed no epileptic activity. She reports that Dr. Lori Miranda in not currently in office and needs clearance to start work next week. Patient reports that she has a history of anxiety. Worse was in 2014. Last needed sertraline in 2017. Last seen therapist in 2015. Reports that she has not needed her medication since 2017. She reports that her anxiety has been controlled off medication since then. Denies depression.       Patient Active Problem List   Diagnosis    Left knee pain    Low back pain--prn pain meds (nsaids)    HNP (herniated nucleus pulposus), lumbar L5-S1--sees dr Ankita White Anxiety--on ssri and seroquel with help--does not see psychiatrist currently    GERD (gastroesophageal reflux disease)--off ppi currently--s/p egd 6/16--wnl    Obstructive apnea    Acute DVT of right tibial vein ([facility] [address]- started xarelto 3/9/16==saw dr moya-(hematol)-tx x 6 mo--feels provoked by dvt--rest of w/u pending post off meds    Colon polyp--on colo 6/16--dr wilcox    Pelvic pain in female    Adnexal mass    Iron deficiency anemia    Chest pain    Hypoglycemia    Thyroid nodule    Tinea pedis    Non morbid obesity, unspecified

## 2018-12-15 ASSESSMENT — ENCOUNTER SYMPTOMS
VOMITING: 0
CHEST TIGHTNESS: 0
CONSTIPATION: 0
DIARRHEA: 0
NAUSEA: 0
SHORTNESS OF BREATH: 0

## 2018-12-30 ENCOUNTER — TELEPHONE (OUTPATIENT)
Dept: OTHER | Age: 48
End: 2018-12-30

## 2019-03-13 ENCOUNTER — HOSPITAL ENCOUNTER (EMERGENCY)
Age: 49
Discharge: HOME | End: 2019-03-13

## 2019-03-13 VITALS
DIASTOLIC BLOOD PRESSURE: 81 MMHG | WEIGHT: 215 LBS | TEMPERATURE: 98.2 F | SYSTOLIC BLOOD PRESSURE: 122 MMHG | RESPIRATION RATE: 20 BRPM | BODY MASS INDEX: 33.74 KG/M2 | HEART RATE: 89 BPM | OXYGEN SATURATION: 99 % | HEIGHT: 67 IN

## 2019-03-13 DIAGNOSIS — L84 CALLUS OF FOOT: Primary | ICD-10-CM

## 2019-03-13 LAB
RAPID INFLUENZA  B AGN: NEGATIVE
RAPID INFLUENZA A AGN: NEGATIVE

## 2019-03-13 PROCEDURE — 87804 INFLUENZA ASSAY W/OPTIC: CPT

## 2019-03-13 PROCEDURE — 99282 EMERGENCY DEPT VISIT SF MDM: CPT

## 2019-03-13 ASSESSMENT — PAIN SCALES - GENERAL
PAINLEVEL_OUTOF10: 2
PAINLEVEL_OUTOF10: 7

## 2019-03-13 ASSESSMENT — PAIN DESCRIPTION - PAIN TYPE: TYPE: ACUTE PAIN

## 2019-03-13 ASSESSMENT — PAIN DESCRIPTION - LOCATION: LOCATION: FOOT

## 2019-04-18 ENCOUNTER — NURSE TRIAGE (OUTPATIENT)
Dept: OTHER | Facility: CLINIC | Age: 49
End: 2019-04-18

## 2019-04-18 NOTE — TELEPHONE ENCOUNTER
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 Patient reports pain at time of onset was severe and she was unable to walk and move normally. Patient reports previous history of lower back pain. Patient reports pain started to improve a few days after injury but has now worsened to the point of 10/10 pain. Patient reports at time of injury, pain radiated into bilateral upper legs, patient denies pain radiating to legs at this time. Patient denies numbness or tingling. Patient reports pain feels like muscle spasms. Patient reports she has taken ibuprofen and a muscle relaxer with no improvement in pain. Patient denies dysuria or change in urine color or smell. Writer instructed patient to go to [facility] facility for evaluation now and complete [facility] at [facility].

## 2019-04-27 ENCOUNTER — EMPLOYEE WELLNESS (OUTPATIENT)
Dept: OTHER | Age: 49
End: 2019-04-27

## 2019-04-27 LAB
CHOLESTEROL, TOTAL: 157 MG/DL (ref 0–199)
GLUCOSE BLD-MCNC: 96 MG/DL (ref 70–99)
HDLC SERPL-MCNC: 52 MG/DL (ref 40–60)
LDL CHOLESTEROL CALCULATED: 95 MG/DL
TRIGL SERPL-MCNC: 49 MG/DL (ref 0–150)

## 2019-05-05 ENCOUNTER — HOSPITAL ENCOUNTER (EMERGENCY)
Age: 49
Discharge: HOME | End: 2019-05-06
Attending: EMERGENCY MEDICINE
Payer: COMMERCIAL

## 2019-05-05 DIAGNOSIS — R10.9 ABDOMINAL PAIN, UNSPECIFIED ABDOMINAL LOCATION: ICD-10-CM

## 2019-05-05 DIAGNOSIS — R19.7 NAUSEA VOMITING AND DIARRHEA: Primary | ICD-10-CM

## 2019-05-05 DIAGNOSIS — R11.2 NAUSEA VOMITING AND DIARRHEA: Primary | ICD-10-CM

## 2019-05-05 LAB
A/G RATIO: 0.9 (ref 1.1–2.2)
ALBUMIN SERPL-MCNC: 3.8 G/DL (ref 3.4–5)
ALP BLD-CCNC: 97 U/L (ref 40–129)
ALT SERPL-CCNC: 20 U/L (ref 10–40)
ANION GAP SERPL CALCULATED.3IONS-SCNC: 9 MMOL/L (ref 3–16)
AST SERPL-CCNC: 35 U/L (ref 15–37)
BASOPHILS ABSOLUTE: 0 K/UL (ref 0–0.2)
BASOPHILS RELATIVE PERCENT: 0.2 %
BILIRUB SERPL-MCNC: 0.9 MG/DL (ref 0–1)
BILIRUBIN URINE: NEGATIVE
BLOOD, URINE: NEGATIVE
BUN BLDV-MCNC: 17 MG/DL (ref 7–20)
CALCIUM SERPL-MCNC: 9 MG/DL (ref 8.3–10.6)
CHLORIDE BLD-SCNC: 101 MMOL/L (ref 99–110)
CLARITY: CLEAR
CO2: 26 MMOL/L (ref 21–32)
COLOR: YELLOW
CREAT SERPL-MCNC: <0.5 MG/DL (ref 0.6–1.1)
EOSINOPHILS ABSOLUTE: 0.1 K/UL (ref 0–0.6)
EOSINOPHILS RELATIVE PERCENT: 0.9 %
GFR AFRICAN AMERICAN: >60
GFR NON-AFRICAN AMERICAN: >60
GLOBULIN: 4.3 G/DL
GLUCOSE BLD-MCNC: 105 MG/DL (ref 70–99)
GLUCOSE URINE: NEGATIVE MG/DL
HCT VFR BLD CALC: 42.3 % (ref 36–48)
HEMOGLOBIN: 14.2 G/DL (ref 12–16)
KETONES, URINE: NEGATIVE MG/DL
LEUKOCYTE ESTERASE, URINE: NEGATIVE
LIPASE: 26 U/L (ref 13–60)
LYMPHOCYTES ABSOLUTE: 0.9 K/UL (ref 1–5.1)
LYMPHOCYTES RELATIVE PERCENT: 11.4 %
MCH RBC QN AUTO: 28.9 PG (ref 26–34)
MCHC RBC AUTO-ENTMCNC: 33.7 G/DL (ref 31–36)
MCV RBC AUTO: 85.9 FL (ref 80–100)
MICROSCOPIC EXAMINATION: NORMAL
MONOCYTES ABSOLUTE: 0.5 K/UL (ref 0–1.3)
MONOCYTES RELATIVE PERCENT: 6.7 %
NEUTROPHILS ABSOLUTE: 6.2 K/UL (ref 1.7–7.7)
NEUTROPHILS RELATIVE PERCENT: 80.8 %
NITRITE, URINE: NEGATIVE
PDW BLD-RTO: 16.2 % (ref 12.4–15.4)
PH UA: 6 (ref 5–8)
PLATELET # BLD: 205 K/UL (ref 135–450)
PMV BLD AUTO: 9.4 FL (ref 5–10.5)
POTASSIUM SERPL-SCNC: 5.1 MMOL/L (ref 3.5–5.1)
PROTEIN UA: NEGATIVE MG/DL
RBC # BLD: 4.92 M/UL (ref 4–5.2)
SODIUM BLD-SCNC: 136 MMOL/L (ref 136–145)
SPECIFIC GRAVITY UA: 1.02 (ref 1–1.03)
TOTAL PROTEIN: 8.1 G/DL (ref 6.4–8.2)
URINE TYPE: NORMAL
UROBILINOGEN, URINE: 0.2 E.U./DL
WBC # BLD: 7.7 K/UL (ref 4–11)

## 2019-05-05 PROCEDURE — 96375 TX/PRO/DX INJ NEW DRUG ADDON: CPT

## 2019-05-05 PROCEDURE — 99284 EMERGENCY DEPT VISIT MOD MDM: CPT

## 2019-05-05 PROCEDURE — 85025 COMPLETE CBC W/AUTO DIFF WBC: CPT

## 2019-05-05 PROCEDURE — 81003 URINALYSIS AUTO W/O SCOPE: CPT

## 2019-05-05 PROCEDURE — 6360000002 HC RX W HCPCS

## 2019-05-05 PROCEDURE — 80053 COMPREHEN METABOLIC PANEL: CPT

## 2019-05-05 PROCEDURE — 6360000002 HC RX W HCPCS: Performed by: EMERGENCY MEDICINE

## 2019-05-05 PROCEDURE — 96374 THER/PROPH/DIAG INJ IV PUSH: CPT

## 2019-05-05 PROCEDURE — 83690 ASSAY OF LIPASE: CPT

## 2019-05-05 RX ORDER — ONDANSETRON 2 MG/ML
INJECTION INTRAMUSCULAR; INTRAVENOUS
Status: COMPLETED
Start: 2019-05-05 | End: 2019-05-05

## 2019-05-05 RX ORDER — KETOROLAC TROMETHAMINE 30 MG/ML
15 INJECTION, SOLUTION INTRAMUSCULAR; INTRAVENOUS ONCE
Status: COMPLETED | OUTPATIENT
Start: 2019-05-05 | End: 2019-05-05

## 2019-05-05 RX ORDER — HYDROCODONE BITARTRATE AND ACETAMINOPHEN 5; 325 MG/1; MG/1
1 TABLET ORAL EVERY 6 HOURS PRN
COMMUNITY
End: 2019-05-28

## 2019-05-05 RX ORDER — ONDANSETRON 2 MG/ML
4 INJECTION INTRAMUSCULAR; INTRAVENOUS ONCE
Status: COMPLETED | OUTPATIENT
Start: 2019-05-05 | End: 2019-05-05

## 2019-05-05 RX ORDER — KETOROLAC TROMETHAMINE 30 MG/ML
INJECTION, SOLUTION INTRAMUSCULAR; INTRAVENOUS
Status: DISCONTINUED
Start: 2019-05-05 | End: 2019-05-06 | Stop reason: HOSPADM

## 2019-05-05 RX ADMIN — ONDANSETRON 4 MG: 2 INJECTION INTRAMUSCULAR; INTRAVENOUS at 20:39

## 2019-05-05 RX ADMIN — KETOROLAC TROMETHAMINE 15 MG: 30 INJECTION, SOLUTION INTRAMUSCULAR at 21:18

## 2019-05-05 ASSESSMENT — PAIN DESCRIPTION - LOCATION
LOCATION: ABDOMEN

## 2019-05-05 ASSESSMENT — PAIN SCALES - GENERAL
PAINLEVEL_OUTOF10: 7
PAINLEVEL_OUTOF10: 5
PAINLEVEL_OUTOF10: 10
PAINLEVEL_OUTOF10: 7

## 2019-05-05 ASSESSMENT — PAIN DESCRIPTION - PAIN TYPE
TYPE: ACUTE PAIN

## 2019-05-05 ASSESSMENT — PAIN DESCRIPTION - DESCRIPTORS: DESCRIPTORS: CONSTANT

## 2019-05-05 ASSESSMENT — PAIN - FUNCTIONAL ASSESSMENT: PAIN_FUNCTIONAL_ASSESSMENT: 0-10

## 2019-05-05 ASSESSMENT — PAIN DESCRIPTION - FREQUENCY: FREQUENCY: CONTINUOUS

## 2019-05-05 ASSESSMENT — PAIN DESCRIPTION - ORIENTATION
ORIENTATION: MID;LOWER
ORIENTATION: MID;LOWER

## 2019-05-05 ASSESSMENT — PAIN DESCRIPTION - PROGRESSION: CLINICAL_PROGRESSION: NOT CHANGED

## 2019-05-06 ENCOUNTER — APPOINTMENT (OUTPATIENT)
Dept: CT IMAGING | Age: 49
End: 2019-05-06
Payer: COMMERCIAL

## 2019-05-06 VITALS
OXYGEN SATURATION: 93 % | HEIGHT: 66 IN | HEART RATE: 73 BPM | RESPIRATION RATE: 15 BRPM | WEIGHT: 220 LBS | TEMPERATURE: 98.2 F | DIASTOLIC BLOOD PRESSURE: 69 MMHG | BODY MASS INDEX: 35.36 KG/M2 | SYSTOLIC BLOOD PRESSURE: 105 MMHG

## 2019-05-06 PROCEDURE — 6360000004 HC RX CONTRAST MEDICATION: Performed by: EMERGENCY MEDICINE

## 2019-05-06 PROCEDURE — 74177 CT ABD & PELVIS W/CONTRAST: CPT

## 2019-05-06 RX ORDER — ONDANSETRON 4 MG/1
4 TABLET, ORALLY DISINTEGRATING ORAL 4 TIMES DAILY PRN
Qty: 15 TABLET | Refills: 0 | Status: SHIPPED | OUTPATIENT
Start: 2019-05-06 | End: 2019-05-20

## 2019-05-06 RX ADMIN — IOPAMIDOL 75 ML: 755 INJECTION, SOLUTION INTRAVENOUS at 00:37

## 2019-05-06 NOTE — ED PROVIDER NOTES
Emergency Physician Note    Chief Complaint  Abdominal Pain (x 2 days); Emesis; and Diarrhea (since 0500 today)       History of Present Illness  Ny Carter is a 53 y.o. female who presents to the ED for abdominal pain since around midnight. Patient reports that she's had vomiting and diarrhea several times today that are neither bloody nor black. Her abdominal pain is periumbilical and right-sided. She denies any fevers, chills or sweats or any chest pain or any urinary or vaginal symptoms. She's never had pain like this before. It began overnight. 10 systems reviewed, pertinent positives per HPI otherwise noted to be negative    I have reviewed the following from the nursing documentation:      Prior to Admission medications    Medication Sig Start Date End Date Taking? Authorizing Provider   HYDROcodone-acetaminophen (NORCO) 5-325 MG per tablet Take 1 tablet by mouth every 6 hours as needed for Pain. Yes Historical Provider, MD   Multiple Vitamins-Minerals (MULTIVITAMIN ADULT PO) Take 1 tablet by mouth daily   Yes Historical Provider, MD   ibuprofen (IBU) 600 MG tablet Take 1 tablet by mouth every 6 hours as needed for Pain 10/18/18 5/5/19 Yes eBtsy Orlando MD       Allergies as of 05/05/2019 - Review Complete 05/05/2019   Allergen Reaction Noted    Latex Rash 11/06/2012    Bee pollen Swelling 05/28/2015    Lime flavor [flavoring agent] Swelling 05/28/2015       Past Medical History:   Diagnosis Date    Anxiety     Chronic back pain     Headache(784.0)     Hx of blood clots     march 2016-was on xarelto for 6months    Obstructive apnea     Seizures ([facility] [address]     states no longer  has them-last one was in 2013-june        Surgical History:   Past Surgical History:   Procedure Laterality Date    KNEE SURGERY Right 2003    RIGHT KNEE    KNEE SURGERY Left 2012    LASIK Bilateral 2004    PARTIAL HYSTERECTOMY  11.4.16    Dr. Gregory Baig          Family History: Family History   Problem Relation Age of Onset    Heart Disease Father     Other Father         LILIAN    Diabetes Father     Alcohol Abuse Father     Obesity Father     Alzheimer's Disease Other         Grandparents    Breast Cancer Other         Aunt    Lung Cancer Other         Grandma    Colon Cancer Other         Grandpa    Diabetes Other         Grandma    Seizures Sister         Uncle, son       Social History     Socioeconomic History    Marital status: Single     Spouse name: Not on file    Number of children: Not on file    Years of education: Not on file    Highest education level: Not on file   Occupational History    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   Social Needs    Financial resource strain: Not on file    Food insecurity:     Worry: Not on file     Inability: Not on file    Transportation needs:     Medical: Not on file     Non-medical: Not on file   Tobacco Use    Smoking status: Former Smoker     Years: 1.00     Types: Cigarettes     Last attempt to quit: 1987     Years since quittin.5    Smokeless tobacco: Never Used    Tobacco comment: counseled on tobacco exposure avoidance   Substance and Sexual Activity    Alcohol use: No     Alcohol/week: 0.0 oz    Drug use: No    Sexual activity: Never   Lifestyle    Physical activity:     Days per week: Not on file     Minutes per session: Not on file    Stress: Not on file   Relationships    Social connections:     Talks on phone: Not on file     Gets together: Not on file     Attends Scientologist service: Not on file     Active member of club or organization: Not on file     Attends meetings of clubs or organizations: Not on file     Relationship status: Not on file    Intimate partner violence:     Fear of current or ex partner: Not on file     Emotionally abused: Not on file     Physically abused: Not on file     Forced sexual activity: Not on file   Other Topics Concern    Not on file   Social History Narrative    Not on file       Nursing notes reviewed. ED Triage Vitals [05/05/19 1941]   Enc Vitals Group      /75      Pulse 92      Resp 20      Temp 98.2 °F (36.8 °C)      Temp Source Oral      SpO2 99 %      Weight 220 lb (99.8 kg)      Height 5' 6\" (1.676 m)      Head Circumference       Peak Flow       Pain Score       Pain Loc       Pain Edu? Excl. in [address]? GENERAL:  Awake, alert. Well developed, well nourished with no apparent distress. HENT:  Normocephalic, Atraumatic, moist mucous membranes. EYES:  Pupils equal round and reactive to light, Conjunctiva normal, extraocular movements normal.  NECK:  No meningeal signs, Supple. CHEST:  Regular rate and rhythm, chest wall non-tender. LUNGS:  Clear to auscultation bilaterally, no respiratory distress. ABDOMEN:  Soft, moderate suprapubic and right lower quadrant and right upper quadrant tenderness, negative Dumont sign, no rebound, rigidity or guarding, non-distended, normal bowel sounds. No costovertebral angle tenderness to palpation. EXTREMITIES:  Normal range of motion, no edema, no tenderness, no deformity, distal pulses present. BACK:  No tenderness. SKIN: Warm, dry and intact. NEUROLOGIC: Normal mental status. Moving all extremities to command. LABS and DIAGNOSTIC RESULTS  RADIOLOGY  X-RAYS:  I have reviewed radiologic plain film image(s). ALL OTHER NON-PLAIN FILM IMAGES SUCH AS CT, ULTRASOUND AND MRI HAVE BEEN READ BY THE RADIOLOGIST. CT ABDOMEN PELVIS W IV CONTRAST Additional Contrast? None   Final Result   No acute process identified.               LABS  Labs Reviewed   CBC WITH AUTO DIFFERENTIAL - Abnormal; Notable for the following components:       Result Value    RDW 16.2 (*)     Lymphocytes # 0.9 (*)     All other components within normal limits    Narrative:     Performed at:  [facility] EM[facility]GENCY [facility]  [address],  [place], [address] [place] [address]   Phone [phone] METABOLIC PANEL - Abnormal; Notable for the following components:    Glucose 105 (*)     CREATININE <0.5 (*)     Albumin/Globulin Ratio 0.9 (*)     All other components within normal limits    Narrative:     Performed at:  [facility]  [address],  [place], [address]   Phone [phone]   LIPASE    Narrative:     Performed at:  [facility]  [address],  [place], [address]   Phone [phone]   URINALYSIS    Narrative:     Performed at:  [facility]  [address],  [place], [address]   Phone [phone]       [facility]       Patient improved with treatment in the ER. Advised return for new or worsening symptoms    I estimate there is LOW risk for ACUTE APPENDICITIS, BOWEL OBSTRUCTION, CHOLECYSTITIS, DIVERTICULITIS, INCARCERATED HERNIA, PANCREATITIS, or PERFORATED BOWEL or ULCER, thus I consider the discharge disposition reasonable. Also, there is no evidence or peritonitis, sepsis, or toxicity. Stephanie Lyons and I have discussed the diagnosis and risks, and we agree with discharging home to follow-up with their primary doctor. We also discussed returning to the Emergency Department immediately if new or worsening symptoms occur. We have discussed the symptoms which are most concerning (e.g., bloody stool, fever, changing or worsening pain, vomiting) that necessitate immediate return. FINAL Impression    1. Nausea vomiting and diarrhea    2. Abdominal pain, unspecified abdominal location        Blood pressure 105/69, pulse 73, temperature 98.2 °F (36.8 °C), temperature source Oral, resp. rate 15, height 5' 6\" (1.676 m), weight 220 lb (99.8 kg), last menstrual period 11/02/2016, SpO2 93 %, not currently breastfeeding. Patient was given scripts for the following medications. I counseled patient how to take these medications.   Discharge Medication List as of 5/6/2019  1:34 AM

## 2019-05-06 NOTE — ED NOTES
Patient reports started having stomach pains at [address]. Reports pain continued to this morning and throughout the day with diarrhea x2, body aches and vomiting. Reports currently nauseated.       [place], [facility]  05/05/19 0779

## 2019-05-28 ENCOUNTER — HOSPITAL ENCOUNTER (EMERGENCY)
Age: 49
Discharge: HOME | End: 2019-05-28
Payer: COMMERCIAL

## 2019-05-28 VITALS
HEART RATE: 77 BPM | TEMPERATURE: 98 F | RESPIRATION RATE: 16 BRPM | WEIGHT: 220 LBS | BODY MASS INDEX: 35.36 KG/M2 | OXYGEN SATURATION: 96 % | DIASTOLIC BLOOD PRESSURE: 82 MMHG | SYSTOLIC BLOOD PRESSURE: 125 MMHG | HEIGHT: 66 IN

## 2019-05-28 DIAGNOSIS — S16.1XXA STRAIN OF NECK MUSCLE, INITIAL ENCOUNTER: ICD-10-CM

## 2019-05-28 DIAGNOSIS — M54.50 ACUTE EXACERBATION OF CHRONIC LOW BACK PAIN: Primary | ICD-10-CM

## 2019-05-28 DIAGNOSIS — G89.29 ACUTE EXACERBATION OF CHRONIC LOW BACK PAIN: Primary | ICD-10-CM

## 2019-05-28 PROCEDURE — 96372 THER/PROPH/DIAG INJ SC/IM: CPT

## 2019-05-28 PROCEDURE — 99283 EMERGENCY DEPT VISIT LOW MDM: CPT

## 2019-05-28 PROCEDURE — 6370000000 HC RX 637 (ALT 250 FOR IP): Performed by: NURSE PRACTITIONER

## 2019-05-28 PROCEDURE — 6360000002 HC RX W HCPCS: Performed by: NURSE PRACTITIONER

## 2019-05-28 RX ORDER — HYDROCODONE BITARTRATE AND ACETAMINOPHEN 5; 325 MG/1; MG/1
1 TABLET ORAL ONCE
Status: COMPLETED | OUTPATIENT
Start: 2019-05-28 | End: 2019-05-28

## 2019-05-28 RX ORDER — LIDOCAINE 4 G/G
1 PATCH TOPICAL DAILY
Status: DISCONTINUED | OUTPATIENT
Start: 2019-05-28 | End: 2019-05-28 | Stop reason: HOSPADM

## 2019-05-28 RX ORDER — KETOROLAC TROMETHAMINE 30 MG/ML
30 INJECTION, SOLUTION INTRAMUSCULAR; INTRAVENOUS ONCE
Status: COMPLETED | OUTPATIENT
Start: 2019-05-28 | End: 2019-05-28

## 2019-05-28 RX ORDER — HYDROCODONE BITARTRATE AND ACETAMINOPHEN 5; 325 MG/1; MG/1
1 TABLET ORAL EVERY 8 HOURS PRN
Qty: 6 TABLET | Refills: 0 | Status: SHIPPED | OUTPATIENT
Start: 2019-05-28 | End: 2019-05-30

## 2019-05-28 RX ORDER — LIDOCAINE 50 MG/G
1 PATCH TOPICAL DAILY
Qty: 30 PATCH | Refills: 0 | Status: SHIPPED | OUTPATIENT
Start: 2019-05-28 | End: 2019-07-24 | Stop reason: ALTCHOICE

## 2019-05-28 RX ORDER — CYCLOBENZAPRINE HCL 10 MG
10 TABLET ORAL 3 TIMES DAILY PRN
Qty: 6 TABLET | Refills: 0 | Status: SHIPPED | OUTPATIENT
Start: 2019-05-28 | End: 2019-05-30

## 2019-05-28 RX ORDER — ORPHENADRINE CITRATE 30 MG/ML
60 INJECTION INTRAMUSCULAR; INTRAVENOUS ONCE
Status: COMPLETED | OUTPATIENT
Start: 2019-05-28 | End: 2019-05-28

## 2019-05-28 RX ADMIN — KETOROLAC TROMETHAMINE 30 MG: 30 INJECTION, SOLUTION INTRAMUSCULAR; INTRAVENOUS at 18:40

## 2019-05-28 RX ADMIN — HYDROCODONE BITARTRATE AND ACETAMINOPHEN 1 TABLET: 5; 325 TABLET ORAL at 19:39

## 2019-05-28 RX ADMIN — ORPHENADRINE CITRATE 60 MG: 60 INJECTION INTRAMUSCULAR; INTRAVENOUS at 18:40

## 2019-05-28 ASSESSMENT — PAIN SCALES - GENERAL
PAINLEVEL_OUTOF10: 7
PAINLEVEL_OUTOF10: 10
PAINLEVEL_OUTOF10: 10

## 2019-05-28 ASSESSMENT — PAIN DESCRIPTION - PAIN TYPE: TYPE: ACUTE PAIN

## 2019-05-28 ASSESSMENT — ENCOUNTER SYMPTOMS
SHORTNESS OF BREATH: 0
ABDOMINAL PAIN: 0
BACK PAIN: 1

## 2019-05-28 NOTE — ED PROVIDER NOTES
**[facility] [place] [facility]  EMERGENCY DEPARTMENT ENCOUNTER      Pt Name: Syed Perez  IOD:[id number]  Birthdate 1970  Date of evaluation: 5/28/2019  Provider: AMARILIS Hernandez CNP      Chief Complaint:    Chief Complaint   Patient presents with    Back Pain     patient states that she is having lower back pain that radiates down her legs. patient states that she hurt herself at work in April. patient states that she has been seeing occupational health. patient states the pain is going from her hips to her neck and then down her legs       Nursing Notes, Past Medical Hx, Past Surgical Hx, Social Hx, Allergies, and Family Hx were all reviewed and agreed with or any disagreements were addressed in the HPI.    HPI:  (Location, Duration, Timing, Severity,Quality, Assoc Sx, Context, Modifying factors)  This is a  53 y.o. female patient who presents to the emergency department with complaints of neck and back pain. Patient reports that she is seeing occupational health for a Worker's Comp. injury of her lower back. She reports that she is at work yesterday and denied any new injury but started with some pain through her neck and shoulders. The pain is shooting from her neck down her back. No numbness or tingling. No caudal anesthesia or bowel or bladder dysfunction. She has been taking Aleve without any relief. She has an upcoming appointment with occupational health on Thursday. She tried to go there today, however they were closed. Pain is currently a 10 out of 10. She is supposed to work tomorrow.     PastMedical/Surgical History:      Diagnosis Date    Anxiety     Chronic back pain     Headache(784.0)     Hx of blood clots     march 2016-was on xarelto for 6months    Obstructive apnea     Seizures ([facility] [address]     states no longer  has them-last one was in 2013-june         Procedure Laterality Date    KNEE SURGERY Right 2003    RIGHT KNEE symptoms. I discussed treatment plan with patient, patient is agreeable and denies questions at this time. She'll be given a work note through Thursday. The patient tolerated their visit well. I evaluated the patient. The physician was available for consultation as needed. The patient and / or the family were informed of the results of anytests, a time was given to answer questions, a plan was proposed and they agreed with plan. CLINICAL IMPRESSION:  1. Acute exacerbation of chronic low back pain    2. Strain of neck muscle, initial encounter        DISPOSITION Decision To Discharge 05/28/2019 07:10:12 PM      PATIENT REFERRED TO:  [place] [id number] [facility], [facility] [address] [place] [id number] [phone]    Go in 2 days  as scheduled      DISCHARGE MEDICATIONS:  New Prescriptions    CYCLOBENZAPRINE (FLEXERIL) 10 MG TABLET    Take 1 tablet by mouth 3 times daily as needed for Muscle spasms    HYDROCODONE-ACETAMINOPHEN (NORCO) 5-325 MG PER TABLET    Take 1 tablet by mouth every 8 hours as needed for Pain for up to 2 days. LIDOCAINE (LIDODERM) 5 %    Place 1 patch onto the skin daily 12 hours on, 12 hours off. DISCONTINUED MEDICATIONS:  Discontinued Medications    HYDROCODONE-ACETAMINOPHEN (NORCO) 5-325 MG PER TABLET    Take 1 tablet by mouth every 6 hours as needed for Pain. Attestation: The Prescription Monitoring Report for this patient was reviewed today.  (AMARILIS Gee CNP)      (Please note the MDM and HPI sections of this note were completed with a voice recognition program.  Efforts weremade to edit the dictations but occasionally words are mis-transcribed.)    Electronically signed, AMARILIS eGe CNP,        AMARILIS Gee CNP  05/28/19 1938

## 2019-06-13 ENCOUNTER — HOSPITAL ENCOUNTER (EMERGENCY)
Age: 49
Discharge: HOME | End: 2019-06-13
Payer: COMMERCIAL

## 2019-06-13 VITALS
OXYGEN SATURATION: 96 % | TEMPERATURE: 98.1 F | BODY MASS INDEX: 35.36 KG/M2 | SYSTOLIC BLOOD PRESSURE: 119 MMHG | WEIGHT: 220 LBS | HEIGHT: 66 IN | HEART RATE: 64 BPM | DIASTOLIC BLOOD PRESSURE: 85 MMHG | RESPIRATION RATE: 16 BRPM

## 2019-06-13 DIAGNOSIS — S39.012A STRAIN OF LUMBAR REGION, INITIAL ENCOUNTER: Primary | ICD-10-CM

## 2019-06-13 DIAGNOSIS — Z02.6 ENCOUNTER FOR ASSESSMENT OF WORK-RELATED CAUSATION OF INJURY: ICD-10-CM

## 2019-06-13 PROCEDURE — 99282 EMERGENCY DEPT VISIT SF MDM: CPT

## 2019-06-13 PROCEDURE — 96372 THER/PROPH/DIAG INJ SC/IM: CPT

## 2019-06-13 PROCEDURE — 97161 PT EVAL LOW COMPLEX 20 MIN: CPT

## 2019-06-13 PROCEDURE — 6360000002 HC RX W HCPCS: Performed by: NURSE PRACTITIONER

## 2019-06-13 PROCEDURE — 97140 MANUAL THERAPY 1/> REGIONS: CPT

## 2019-06-13 PROCEDURE — 96374 THER/PROPH/DIAG INJ IV PUSH: CPT

## 2019-06-13 RX ORDER — METHOCARBAMOL 750 MG/1
750 TABLET, FILM COATED ORAL 4 TIMES DAILY
COMMUNITY
End: 2019-06-13 | Stop reason: CLARIF

## 2019-06-13 RX ORDER — KETOROLAC TROMETHAMINE 30 MG/ML
15 INJECTION, SOLUTION INTRAMUSCULAR; INTRAVENOUS ONCE
Status: COMPLETED | OUTPATIENT
Start: 2019-06-13 | End: 2019-06-13

## 2019-06-13 RX ORDER — PREDNISONE 20 MG/1
20 TABLET ORAL DAILY
COMMUNITY
End: 2019-07-24 | Stop reason: ALTCHOICE

## 2019-06-13 RX ORDER — ORPHENADRINE CITRATE 30 MG/ML
60 INJECTION INTRAMUSCULAR; INTRAVENOUS ONCE
Status: COMPLETED | OUTPATIENT
Start: 2019-06-13 | End: 2019-06-13

## 2019-06-13 RX ORDER — ORPHENADRINE CITRATE 100 MG/1
100 TABLET, EXTENDED RELEASE ORAL 2 TIMES DAILY
Qty: 14 TABLET | Refills: 0 | Status: SHIPPED | OUTPATIENT
Start: 2019-06-13 | End: 2019-06-20

## 2019-06-13 RX ADMIN — KETOROLAC TROMETHAMINE 15 MG: 30 INJECTION, SOLUTION INTRAMUSCULAR at 13:56

## 2019-06-13 RX ADMIN — ORPHENADRINE CITRATE 60 MG: 30 INJECTION INTRAMUSCULAR; INTRAVENOUS at 13:56

## 2019-06-13 ASSESSMENT — PAIN SCALES - GENERAL
PAINLEVEL_OUTOF10: 1
PAINLEVEL_OUTOF10: 4
PAINLEVEL_OUTOF10: 2

## 2019-06-13 ASSESSMENT — ENCOUNTER SYMPTOMS
BACK PAIN: 1
NAUSEA: 0
VOMITING: 0

## 2019-06-13 NOTE — ED PROVIDER NOTES
**[facility]  [facility]  EMERGENCY DEPARTMENT ENCOUNTER      Pt Name: Jimi WALTERS:[id number]  [place] 1970  Date of evaluation: 6/13/2019  Provider: AMARILIS Cisneros CNP      Chief Complaint:    Chief Complaint   Patient presents with    Back Pain     mid to lower chronic working today light duty back spasms started up again       Nursing Notes, Past Medical Hx, Past Surgical Hx, Social Hx, Allergies, and Family Hx were all reviewed and agreed with or any disagreements were addressed in the HPI.    HPI:  (Location, Duration, Timing, Severity,Quality, Assoc Sx, Context, Modifying factors)  This is a  53 y.o. female who presents with left sided low back pain that started after vacuuming the stairwell at work today. States he pulled her low back in April and  Has had issues since and is seeing occupational therapy regarding this issue. States she takes Robaxin and prednisone. States while vacuuming today she got to the last step and the pain shot in her left lower back and it became tight. Denies any fall, numbness or tingling of lower extremities, recent surgeries, loss/retention of bowel or bladder or saddle anesthesia's. Denies any midline bone pain as well.  Rates pain a 4/10    PastMedical/Surgical History:      Diagnosis Date    Anxiety     Chronic back pain     Headache(784.0)     Hx of blood clots     march 2016-was on xarelto for 6months    Obstructive apnea     Seizures ([facility] [address]     states no longer  has them-last one was in 2013-june         Procedure Laterality Date    KNEE SURGERY Right 2003    RIGHT KNEE    KNEE SURGERY Left 2012    LASIK Bilateral 2004    PARTIAL HYSTERECTOMY  11.4.16    Dr. Britt Chan         Medications:  Discharge Medication List as of 6/13/2019  2:06 PM      CONTINUE these medications which have NOT CHANGED    Details   predniSONE (DELTASONE) 20 MG tablet Take 20 mg by mouth dailyHistorical Med      lidocaine (LIDODERM) Place 1 patch onto the skin nightlyHistorical Med      lidocaine (LIDODERM) 5 % Place 1 patch onto the skin daily 12 hours on, 12 hours off., Disp-30 patch, R-0Print      Multiple Vitamins-Minerals (MULTIVITAMIN ADULT PO) Take 1 tablet by mouth dailyHistorical Med      ibuprofen (IBU) 600 MG tablet Take 1 tablet by mouth every 6 hours as needed for Pain, Disp-20 tablet, R-0Print               Review of Systems:  Review of Systems   Constitutional: Negative. Gastrointestinal: Negative for nausea and vomiting. Musculoskeletal: Positive for back pain. Negative for neck pain and neck stiffness. Skin: Negative. Neurological: Negative for weakness and numbness. Positives and Pertinent negatives as per HPI. Except as noted above in the ROS, problem specific ROS was completed and is negative. Physical Exam:  Physical Exam   Constitutional: She is oriented to person, place, and time. She appears well-developed and well-nourished. No distress. HENT:   Head: Normocephalic and atraumatic. Neck: Normal range of motion. Cardiovascular: Normal rate, regular rhythm, normal heart sounds and intact distal pulses. Exam reveals no friction rub. No murmur heard. Pulmonary/Chest: Effort normal.   Musculoskeletal: She exhibits tenderness. She exhibits no edema or deformity. Lumbar back: She exhibits decreased range of motion and tenderness. She exhibits no bony tenderness. Back:    Lymphadenopathy:     She has no cervical adenopathy. Neurological: She is alert and oriented to person, place, and time. She displays normal reflexes. No cranial nerve deficit or sensory deficit. She exhibits normal muscle tone. Coordination normal.   Skin: Skin is warm and dry. Capillary refill takes less than 2 seconds. She is not diaphoretic. Psychiatric: She has a normal mood and affect.  Her behavior is normal. Judgment and thought content normal.       MEDICAL DECISION MAKING    Vitals:    Vitals:    06/13/19 1254 06/13/19 1414   BP: (!) 125/99 119/85   Pulse: 79 64   Resp: 16 16   Temp: 98.1 °F (36.7 °C)    TempSrc: Oral    SpO2: 95% 96%   Weight: 220 lb (99.8 kg)    Height: 5' 6\" (1.676 m)        LABS:Labs Reviewed - No data to display     Remainder of labs reviewed and werenegative at this time or not returned at the time of this note. RADIOLOGY:   Non-plain film images such as CT, Ultrasound and MRI are read by the radiologist. AMARILIS Rodriguez CNP have directly visualized the radiologic plain film image(s) with the below findings:        Interpretation per the Radiologist below, if available at the time of thisnote:    No orders to display        No results found. MEDICAL DECISION MAKING / ED COURSE:      PROCEDURES:   Procedures    None    Patient was given:  Medications   orphenadrine (NORFLEX) injection 60 mg (60 mg Intramuscular Given 6/13/19 1356)   ketorolac (TORADOL) injection 15 mg (15 mg Intravenous Given 6/13/19 1356)       Patient is alert and oriented x4. Skin is pwd, no cyanosis or pallor. Heart with RRR. Lumbar spine with left sided paraspinal pain. No midline pain with palpation. Bilateral lower extremities with equal strength. DTR's are normal.  Differentials: low back strain, low back pain, sciatica, fracture, herniated disc  Norflex and Toradol given  Diagnosis: Lumbar strain  Patient will be discharged with Norflex and instructed to discontinue the Robaxin and try this muscle relaxer instead. Follow up with PCP this week    The patient tolerated their visit well. I evaluated the patient. The physician was available for consultation as needed. The patient and / or the family were informed of the results of anytests, a time was given to answer questions, a plan was proposed and they agreed with plan. CLINICAL IMPRESSION:  1. Strain of lumbar region, initial encounter    2.  Encounter for assessment of work-related causation of injury        DISPOSITION Decision To Discharge 06/13/2019 02:05:08 PM      PATIENT REFERRED TO:  Romayne Brazen, MD [address]  [place] [address]  [phone]    Schedule an appointment as soon as possible for a visit in 3 days  For recheck    Steven Mirza, [address] [phone]    Schedule an appointment as soon as possible for a visit in 1 week  For recheck, As needed if pain continues    [facility]  ED  [address]  Go to   For new or worsening symptoms      DISCHARGE MEDICATIONS:  Discharge Medication List as of 6/13/2019  2:06 PM      START taking these medications    Details   orphenadrine (NORFLEX) 100 MG extended release tablet Take 1 tablet by mouth 2 times daily for 7 days, Disp-14 tablet, R-0Print             DISCONTINUED MEDICATIONS:  Discharge Medication List as of 6/13/2019  2:06 PM                 (Please note the MDM and HPI sections of this note were completed with a voice recognition program.  Efforts weremade to edit the dictations but occasionally words are mis-transcribed.)    Electronically signed, AMARILIS Hernandez CNP,        AMARILIS Hernandez CNP  06/13/19 Jose David Chavez, AMARILIS Pacheco CNP  06/19/19 0247

## 2019-06-13 NOTE — ED NOTES
LUMBOSACRAL PHYSICAL THERAPY EVALUATION  EMERGENCY DEPARTMENT     Received referral for Physical Therapy Evaluation in the Emergency Department. Pt educated to role of PT in the ED, and pt agreeable to proceed with evaluation. Evaluation Date: 6/13/2019     Patient Name: Cortez Hilton   YOB: 1970    Medical Diagnosis:  Back pain  Treatment Diagnosis:  L SI joint dysfunction  Onset Date:  6/13/19    Referral Date: 6/13/2019    Referring Provider: Stephany Gama NP  Restrictions/Precautions:        SUBJECTIVE FINDINGS       History of Present Illness:      Pt presents with c/o back pain. Notes that she was vacuuming stairs at work today when she felt a pain in her lower back. Pt notes that she just returned from lunch. Pt has a history of back pain. Notes that she started aquatic therapy last year but lost her insurance and could not continue. This is the second back injury at work this year. Notes that she is fighting worker's comp at this time. Notes that she is on restrictions from work. Notes that on a daily basis she normally has some level of back discomfort. Notes that she is having trouble completing her job. \"I would not have taken this job if I knew what I was going to have to do. \" Notes a flare up on Sunday night, as well. Pain       Patient describes pain to be warmth, uncomfortable    Patient reports 4/10 pain at present and  6/10 pain at its worst.  Worsened by movement   Improved by Rest, medication  Pt. reports pain with coughing, sneezing and laughing:  []   Yes [x]   No   Pt. reports bowel and bladder changes:   []   Yes [x]   No   Current Functional Limitations:    PLOF:    Pt's sleep is affected?    []   Yes [x]   No  []   N/A    OBJECTIVE FINDINGS       Imaging Results:    Posture    []   Forward head  []   Forward flexed trunk   []   Pronounced CT junction    []   Increased thoracic kyphosis   [x]   Increased lumbar lordosis   []   Scoliosis   []   Swayback  []   Decreased WB on   []   R   []   L   []   Scapular winging  []   Other:       Palpation     Patient reported tenderness with palpation  [x]   Yes   []   No   []   NT  Location:  Central lumbar spine  PT notes warmth  []   Yes   [x]   No   []   NT  Location:   PT notes increased muscle tone   []   Yes   [x]   No   []   NT  Location:   Ligament tenderness/provocation:   []   Yes   [x]   No   []   NT  Location:      Gait/Steps/Balance       Deviations on a level linoleum surface include WBOS, step-to sequence, shuffling    [x]  All balance WFL unless otherwise noted below:  Static/ Dynamic Sitting:  Static/Dynamic Standing:    Quick Tests/Functional Myotome Tests:     Heel Walk (L4): []   Able to perform WNL       []   Unable to perform         [x]   NT    Toe Walk (S1):  []   Able to perform WNL     []   Unable to perform     [x]   NT    SI Special Tests     SI Special Test Findings   Standing Landmarks [x]  Iliac Crests Equal   [] R High  [] L High  []  PSIS Equal   []  R High  [x]  L High     Standing Flexion Test []   WFL     []   Positive R []   Positive L   Supine Landmarks [x]  Iliac Crests Equal   []  R High   [] L High  []  ASIS Equal   [x]  R High  []  L High     Seated Flexion Test []   WFL     []   Positive R []   Positive L   Sit up Test/Long sit test []   NEG     [x]   Positive - Comment below: LLE longer     Sacral Sulci Test []   [facility]     []   Positive R []   Positive L   SI distraction []   NEG      []   Positive R []   Positive L     SI compression []   NEG     []   Positive R []   Positive L   Sacral thrust tests []   NEG      []   Positive R []   Positive L   Prone knee bend test []   NEG     []   Positive     []   NT  Comments:     Lumbar Special Tests     Lumbar Special Test Findings   Straight Leg Raise [x]   NEG    []   Positive R []   Positive L   Crams []   NEG    []   Positive R []   Positive L   Dural Tension/Slump test []   NEG    []   Positive R []   Positive L   Distraction []   NEG    []   Inc pain []   Dec pain   Compression []   NEG    []   Inc pain []   Dec pain     Lumbar Range of Motion/Strength Testing      [x] All WNL except as marked below  ROM (*denotes pain) AROM PROM COMMENTS   Flexion 50%     Extension 75%     Sidebending Left      Sidebending Right      Rotation Left    []   Seated  []   Standing       Rotation Right    []   Seated  []   Standing         Sensation/Motor Function   [x] All dermatomes WNL except as marked below   [x] All  myotomes WNL except as marked below      Dermatome Left Right Myotome Left Right   Anterior groin, 2-3 inches below ASIS (L1-L2)   Hip Flexion (L1-L2)     Middle third anterior thigh (L3)   Hip adduction (L3)     Patella and med malleolus (L4)   Knee extension (L3,4)     Fibular head and dorsum of foot (L5)   Ankle dorsiflexion (L4)     Lateral side and plantar surface of foot (S1)   Hip abduction (L5)     Medial aspect of posterior thigh (S2)   Great toe extension (L5)     Perianal area (S3,4)   Knee flexion (L5,S1)        Ankle plantarflexion (S1,2)       Reflexes/Trunk Strength    [x] All WNL except as marked below     Reflex Left Right Strength Strength   Quadriceps (L3,4)   Trunk Extensors    Achilles (S1,2)   Gluteals    Ankle clonus   Abdominals    Albin            TREATMENT  Educated on anatomy, physiology, and biomechanics of lumbar spine, SI joint, and movement dysfunction. Pt verbalized understanding and all of patient's questions answered to satisfaction. Business card and HEP issued. Manual tx: scissors MET for L AI, shotgun technique with cavitation, LLE inferior hip mobilization grade III/IV       Pt response to treatment: Pt responded well to treatment. Pt with increased mobility and improved gait sequence, with only minimal, chronic LBP symptoms. All acute symptoms diminished. ASSESSMENT     Pt presenting to ED with c/o LBP.  Through evaluation and examination, identified L sided SI joint dysfunction with associated leg length

## 2019-06-13 NOTE — ED NOTES
Mid to lower back pain. Pt states \"I hurt my back in April from working in [employer] here in the hospital I was cleaning the staircase today felt a spasm in my lower back I already take predisone and I have a Lidoderm patch on and I take a muscle relaxer\". Pt denies loss of Bowel Bladder control no abdominal pain. Pt denies numbness tingling in legs.       Stephanie Guzman LPN  43/14/43 0236

## 2019-06-13 NOTE — ED NOTES
Pt scripts x1 instructed to follow up with PCP/[facility]. Assessed per [facility] DOC.      Paris Snow LPN  32/04/56 1108

## 2019-06-21 ENCOUNTER — OFFICE VISIT (OUTPATIENT)
Dept: FAMILY MEDICINE CLINIC | Age: 49
End: 2019-06-21
Payer: COMMERCIAL

## 2019-06-21 ENCOUNTER — HOSPITAL ENCOUNTER (OUTPATIENT)
Dept: GENERAL RADIOLOGY | Age: 49
Discharge: HOME | End: 2019-06-21
Payer: COMMERCIAL

## 2019-06-21 ENCOUNTER — HOSPITAL ENCOUNTER (OUTPATIENT)
Age: 49
Discharge: HOME | End: 2019-06-21
Payer: COMMERCIAL

## 2019-06-21 VITALS
DIASTOLIC BLOOD PRESSURE: 70 MMHG | WEIGHT: 218 LBS | HEART RATE: 73 BPM | BODY MASS INDEX: 35.19 KG/M2 | SYSTOLIC BLOOD PRESSURE: 120 MMHG | RESPIRATION RATE: 18 BRPM

## 2019-06-21 DIAGNOSIS — M54.50 LOW BACK PAIN RADIATING TO LEFT LEG: ICD-10-CM

## 2019-06-21 DIAGNOSIS — M79.605 LOW BACK PAIN RADIATING TO LEFT LEG: ICD-10-CM

## 2019-06-21 DIAGNOSIS — S39.012A STRAIN OF LUMBAR REGION, INITIAL ENCOUNTER: Primary | ICD-10-CM

## 2019-06-21 DIAGNOSIS — S39.012A STRAIN OF LUMBAR REGION, INITIAL ENCOUNTER: ICD-10-CM

## 2019-06-21 PROCEDURE — 72100 X-RAY EXAM L-S SPINE 2/3 VWS: CPT

## 2019-06-21 PROCEDURE — 99214 OFFICE O/P EST MOD 30 MIN: CPT | Performed by: NURSE PRACTITIONER

## 2019-06-21 RX ORDER — NAPROXEN 500 MG/1
500 TABLET ORAL 2 TIMES DAILY PRN
Qty: 60 TABLET | Refills: 0 | Status: SHIPPED | OUTPATIENT
Start: 2019-06-21 | End: 2019-08-14

## 2019-06-21 ASSESSMENT — ENCOUNTER SYMPTOMS
BACK PAIN: 1
NAUSEA: 0
DIARRHEA: 0
VOMITING: 0
SHORTNESS OF BREATH: 0
COUGH: 0

## 2019-06-21 ASSESSMENT — PATIENT HEALTH QUESTIONNAIRE - PHQ9
1. LITTLE INTEREST OR PLEASURE IN DOING THINGS: 0
SUM OF ALL RESPONSES TO PHQ QUESTIONS 1-9: 0
SUM OF ALL RESPONSES TO PHQ9 QUESTIONS 1 & 2: 0
SUM OF ALL RESPONSES TO PHQ QUESTIONS 1-9: 0
2. FEELING DOWN, DEPRESSED OR HOPELESS: 0

## 2019-06-28 ENCOUNTER — HOSPITAL ENCOUNTER (EMERGENCY)
Age: 49
Discharge: HOME | End: 2019-06-28
Attending: EMERGENCY MEDICINE
Payer: COMMERCIAL

## 2019-06-28 VITALS
HEART RATE: 70 BPM | OXYGEN SATURATION: 99 % | WEIGHT: 220 LBS | SYSTOLIC BLOOD PRESSURE: 110 MMHG | TEMPERATURE: 97.5 F | HEIGHT: 66 IN | RESPIRATION RATE: 12 BRPM | DIASTOLIC BLOOD PRESSURE: 84 MMHG | BODY MASS INDEX: 35.36 KG/M2

## 2019-06-28 DIAGNOSIS — R51.9 NONINTRACTABLE HEADACHE, UNSPECIFIED CHRONICITY PATTERN, UNSPECIFIED HEADACHE TYPE: Primary | ICD-10-CM

## 2019-06-28 LAB
A/G RATIO: 0.9 (ref 1.1–2.2)
ALBUMIN SERPL-MCNC: 3.5 G/DL (ref 3.4–5)
ALP BLD-CCNC: 86 U/L (ref 40–129)
ALT SERPL-CCNC: 28 U/L (ref 10–40)
ANION GAP SERPL CALCULATED.3IONS-SCNC: 10 MMOL/L (ref 3–16)
AST SERPL-CCNC: 24 U/L (ref 15–37)
BASOPHILS ABSOLUTE: 0.1 K/UL (ref 0–0.2)
BASOPHILS RELATIVE PERCENT: 0.7 %
BILIRUB SERPL-MCNC: 0.5 MG/DL (ref 0–1)
BUN BLDV-MCNC: 29 MG/DL (ref 7–20)
CALCIUM SERPL-MCNC: 9.2 MG/DL (ref 8.3–10.6)
CHLORIDE BLD-SCNC: 103 MMOL/L (ref 99–110)
CO2: 27 MMOL/L (ref 21–32)
CREAT SERPL-MCNC: 0.6 MG/DL (ref 0.6–1.1)
EOSINOPHILS ABSOLUTE: 0.4 K/UL (ref 0–0.6)
EOSINOPHILS RELATIVE PERCENT: 5.3 %
GFR AFRICAN AMERICAN: >60
GFR NON-AFRICAN AMERICAN: >60
GLOBULIN: 3.8 G/DL
GLUCOSE BLD-MCNC: 99 MG/DL (ref 70–99)
HCT VFR BLD CALC: 38.6 % (ref 36–48)
HEMOGLOBIN: 12.9 G/DL (ref 12–16)
LYMPHOCYTES ABSOLUTE: 2.1 K/UL (ref 1–5.1)
LYMPHOCYTES RELATIVE PERCENT: 29.5 %
MCH RBC QN AUTO: 29 PG (ref 26–34)
MCHC RBC AUTO-ENTMCNC: 33.5 G/DL (ref 31–36)
MCV RBC AUTO: 86.6 FL (ref 80–100)
MONOCYTES ABSOLUTE: 0.7 K/UL (ref 0–1.3)
MONOCYTES RELATIVE PERCENT: 9.5 %
NEUTROPHILS ABSOLUTE: 4 K/UL (ref 1.7–7.7)
NEUTROPHILS RELATIVE PERCENT: 55 %
PDW BLD-RTO: 15.7 % (ref 12.4–15.4)
PLATELET # BLD: 169 K/UL (ref 135–450)
PMV BLD AUTO: 9.3 FL (ref 5–10.5)
POTASSIUM SERPL-SCNC: 4.3 MMOL/L (ref 3.5–5.1)
RBC # BLD: 4.46 M/UL (ref 4–5.2)
SODIUM BLD-SCNC: 140 MMOL/L (ref 136–145)
TOTAL PROTEIN: 7.3 G/DL (ref 6.4–8.2)
WBC # BLD: 7.2 K/UL (ref 4–11)

## 2019-06-28 PROCEDURE — 2580000003 HC RX 258: Performed by: NURSE PRACTITIONER

## 2019-06-28 PROCEDURE — 99283 EMERGENCY DEPT VISIT LOW MDM: CPT

## 2019-06-28 PROCEDURE — 6370000000 HC RX 637 (ALT 250 FOR IP): Performed by: NURSE PRACTITIONER

## 2019-06-28 PROCEDURE — 96375 TX/PRO/DX INJ NEW DRUG ADDON: CPT

## 2019-06-28 PROCEDURE — 96361 HYDRATE IV INFUSION ADD-ON: CPT

## 2019-06-28 PROCEDURE — 6360000002 HC RX W HCPCS: Performed by: NURSE PRACTITIONER

## 2019-06-28 PROCEDURE — 96374 THER/PROPH/DIAG INJ IV PUSH: CPT

## 2019-06-28 PROCEDURE — 85025 COMPLETE CBC W/AUTO DIFF WBC: CPT

## 2019-06-28 PROCEDURE — 80053 COMPREHEN METABOLIC PANEL: CPT

## 2019-06-28 RX ORDER — KETOROLAC TROMETHAMINE 30 MG/ML
30 INJECTION, SOLUTION INTRAMUSCULAR; INTRAVENOUS ONCE
Status: COMPLETED | OUTPATIENT
Start: 2019-06-28 | End: 2019-06-28

## 2019-06-28 RX ORDER — HYDROCODONE BITARTRATE AND ACETAMINOPHEN 5; 325 MG/1; MG/1
1 TABLET ORAL EVERY 6 HOURS PRN
COMMUNITY
End: 2019-07-24 | Stop reason: ALTCHOICE

## 2019-06-28 RX ORDER — ORPHENADRINE CITRATE 100 MG/1
100 TABLET, EXTENDED RELEASE ORAL 2 TIMES DAILY
COMMUNITY
End: 2019-07-24 | Stop reason: ALTCHOICE

## 2019-06-28 RX ORDER — ACETAMINOPHEN, ASPIRIN AND CAFFEINE 250; 250; 65 MG/1; MG/1; MG/1
1 TABLET, FILM COATED ORAL EVERY 6 HOURS PRN
Qty: 90 TABLET | Refills: 0 | Status: SHIPPED | OUTPATIENT
Start: 2019-06-28 | End: 2020-03-02

## 2019-06-28 RX ORDER — DIPHENHYDRAMINE HYDROCHLORIDE 50 MG/ML
25 INJECTION INTRAMUSCULAR; INTRAVENOUS ONCE
Status: COMPLETED | OUTPATIENT
Start: 2019-06-28 | End: 2019-06-28

## 2019-06-28 RX ORDER — ONDANSETRON 4 MG/1
4 TABLET, ORALLY DISINTEGRATING ORAL EVERY 8 HOURS PRN
Qty: 12 TABLET | Refills: 0 | Status: SHIPPED | OUTPATIENT
Start: 2019-06-28 | End: 2019-07-24 | Stop reason: ALTCHOICE

## 2019-06-28 RX ORDER — 0.9 % SODIUM CHLORIDE 0.9 %
1000 INTRAVENOUS SOLUTION INTRAVENOUS ONCE
Status: COMPLETED | OUTPATIENT
Start: 2019-06-28 | End: 2019-06-28

## 2019-06-28 RX ORDER — METOCLOPRAMIDE HYDROCHLORIDE 5 MG/ML
10 INJECTION INTRAMUSCULAR; INTRAVENOUS ONCE
Status: COMPLETED | OUTPATIENT
Start: 2019-06-28 | End: 2019-06-28

## 2019-06-28 RX ORDER — CYCLOBENZAPRINE HCL 10 MG
10 TABLET ORAL 3 TIMES DAILY PRN
COMMUNITY
End: 2019-07-24 | Stop reason: ALTCHOICE

## 2019-06-28 RX ORDER — BUTALBITAL, ACETAMINOPHEN AND CAFFEINE 50; 325; 40 MG/1; MG/1; MG/1
1 TABLET ORAL ONCE
Status: COMPLETED | OUTPATIENT
Start: 2019-06-28 | End: 2019-06-28

## 2019-06-28 RX ADMIN — METOCLOPRAMIDE 10 MG: 5 INJECTION, SOLUTION INTRAMUSCULAR; INTRAVENOUS at 11:21

## 2019-06-28 RX ADMIN — KETOROLAC TROMETHAMINE 30 MG: 30 INJECTION, SOLUTION INTRAMUSCULAR at 11:21

## 2019-06-28 RX ADMIN — DIPHENHYDRAMINE HYDROCHLORIDE 25 MG: 50 INJECTION, SOLUTION INTRAMUSCULAR; INTRAVENOUS at 11:21

## 2019-06-28 RX ADMIN — SODIUM CHLORIDE 1000 ML: 9 INJECTION, SOLUTION INTRAVENOUS at 11:21

## 2019-06-28 RX ADMIN — BUTALBITAL, ACETAMINOPHEN, AND CAFFEINE 1 TABLET: 50; 325; 40 TABLET ORAL at 11:21

## 2019-06-28 ASSESSMENT — PAIN SCALES - GENERAL
PAINLEVEL_OUTOF10: 10
PAINLEVEL_OUTOF10: 10

## 2019-06-28 NOTE — ED PROVIDER NOTES
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   Social Needs    Financial resource strain: Not on file    Food insecurity:     Worry: Not on file     Inability: Not on file    Transportation needs:     Medical: Not on file     Non-medical: Not on file   Tobacco Use    Smoking status: Former Smoker     Years: 1.00     Types: Cigarettes     Last attempt to quit: 1987     Years since quittin.6    Smokeless tobacco: Never Used    Tobacco comment: counseled on tobacco exposure avoidance   Substance and Sexual Activity    Alcohol use: No     Alcohol/week: 0.0 oz    Drug use: No    Sexual activity: Never   Lifestyle    Physical activity:     Days per week: Not on file     Minutes per session: Not on file    Stress: Not on file   Relationships    Social connections:     Talks on phone: Not on file     Gets together: Not on file     Attends Roman Catholic service: Not on file     Active member of club or organization: Not on file     Attends meetings of clubs or organizations: Not on file     Relationship status: Not on file    Intimate partner violence:     Fear of current or ex partner: Not on file     Emotionally abused: Not on file     Physically abused: Not on file     Forced sexual activity: Not on file   Other Topics Concern    Not on file   Social History Narrative    Not on file     No current facility-administered medications for this encounter.       Current

## 2019-07-05 ENCOUNTER — OFFICE VISIT (OUTPATIENT)
Dept: FAMILY MEDICINE CLINIC | Age: 49
End: 2019-07-05
Payer: COMMERCIAL

## 2019-07-05 VITALS
BODY MASS INDEX: 35.68 KG/M2 | TEMPERATURE: 98.2 F | SYSTOLIC BLOOD PRESSURE: 112 MMHG | HEART RATE: 94 BPM | DIASTOLIC BLOOD PRESSURE: 70 MMHG | HEIGHT: 66 IN | OXYGEN SATURATION: 95 % | WEIGHT: 222 LBS

## 2019-07-05 DIAGNOSIS — M51.26 HNP (HERNIATED NUCLEUS PULPOSUS), LUMBAR: ICD-10-CM

## 2019-07-05 DIAGNOSIS — M54.50 ACUTE MIDLINE LOW BACK PAIN WITHOUT SCIATICA: Primary | ICD-10-CM

## 2019-07-05 DIAGNOSIS — E66.9 NON MORBID OBESITY, UNSPECIFIED OBESITY TYPE: Chronic | ICD-10-CM

## 2019-07-05 PROCEDURE — 99214 OFFICE O/P EST MOD 30 MIN: CPT | Performed by: INTERNAL MEDICINE

## 2019-07-05 ASSESSMENT — ENCOUNTER SYMPTOMS
VOMITING: 0
NAUSEA: 1

## 2019-07-05 NOTE — PROGRESS NOTES
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   Social Needs    Financial resource strain: None    Food insecurity:     Worry: None     Inability: None    Transportation needs:     Medical: None     Non-medical: None   Tobacco Use    Smoking status: Former Smoker     Years: 1.00     Types: Cigarettes     Last attempt to quit: 1987     Years since quittin.6    Smokeless tobacco: Never Used    Tobacco comment: counseled on tobacco exposure avoidance   Substance and Sexual Activity    Alcohol use: No     Alcohol/week: 0.0 oz    Drug use: No    Sexual activity: Never   Lifestyle    Physical activity:     Days per week: None     Minutes per session: None    Stress: None   Relationships    Social connections:     Talks on phone: None     Gets together: None     Attends Mu-ism service: None     Active member of club or organization: None     Attends meetings of clubs or organizations: None     Relationship status: None    Intimate partner violence:     Fear of current or ex partner: None     Emotionally abused: None     Physically abused: None     Forced sexual activity: None   Other Topics Concern    None   Social History Narrative    None        Family History   Problem Relation Age of Onset    Heart Disease Father     Other Father         LILIAN    Diabetes Father     Alcohol Abuse Father     Obesity Father     Alzheimer's Disease Other         Grandparents    Breast Cancer Other         Aunt    Lung Cancer Other         Grandma    Colon Cancer Other         Grandpa    Diabetes Other         Grandma    Seizures Sister         Valaria Davis, son     Prior to Visit Medications    Medication Sig Taking?  Authorizing Provider   orphenadrine (NORFLEX) 100 MG extended release tablet Take 100 mg by mouth 2 times daily Yes Historical Provider, MD   cyclobenzaprine (FLEXERIL) 10 MG tablet Take 10 mg by mouth 3 times daily

## 2019-07-09 ENCOUNTER — OFFICE VISIT (OUTPATIENT)
Dept: ORTHOPEDIC SURGERY | Age: 49
End: 2019-07-09
Payer: COMMERCIAL

## 2019-07-09 VITALS
HEIGHT: 66 IN | WEIGHT: 222 LBS | BODY MASS INDEX: 35.68 KG/M2 | HEART RATE: 82 BPM | SYSTOLIC BLOOD PRESSURE: 109 MMHG | DIASTOLIC BLOOD PRESSURE: 60 MMHG

## 2019-07-09 DIAGNOSIS — M54.5 LOW BACK PAIN, UNSPECIFIED BACK PAIN LATERALITY, UNSPECIFIED CHRONICITY, WITH SCIATICA PRESENCE UNSPECIFIED: Primary | ICD-10-CM

## 2019-07-09 PROCEDURE — 99204 OFFICE O/P NEW MOD 45 MIN: CPT | Performed by: PHYSICIAN ASSISTANT

## 2019-07-09 RX ORDER — PREDNISONE 1 MG/1
5 TABLET ORAL DAILY
Qty: 20 TABLET | Refills: 0 | Status: SHIPPED | OUTPATIENT
Start: 2019-07-09 | End: 2019-07-29

## 2019-07-10 ASSESSMENT — ENCOUNTER SYMPTOMS
ABDOMINAL PAIN: 0
WHEEZING: 0
SHORTNESS OF BREATH: 0
CONSTIPATION: 0
COUGH: 0
ALLERGIC/IMMUNOLOGIC NEGATIVE: 1
NAUSEA: 0
SORE THROAT: 0
VOMITING: 0
EYES NEGATIVE: 1
DIARRHEA: 0
BACK PAIN: 1

## 2019-07-17 ENCOUNTER — HOSPITAL ENCOUNTER (OUTPATIENT)
Dept: PHYSICAL THERAPY | Age: 49
Setting detail: THERAPIES SERIES
Discharge: HOME | End: 2019-07-17
Payer: COMMERCIAL

## 2019-07-17 PROCEDURE — 97161 PT EVAL LOW COMPLEX 20 MIN: CPT

## 2019-07-17 PROCEDURE — 97110 THERAPEUTIC EXERCISES: CPT

## 2019-07-17 PROCEDURE — 97140 MANUAL THERAPY 1/> REGIONS: CPT

## 2019-07-17 NOTE — FLOWSHEET NOTE
.Outpatient Physical Therapy     [] Daily Treatment Note   [] Progress Note   [] Discharge Note      Date:  2019    Patient Name:  Mary Sanchez        :  1970    Restrictions/Precautions:      Diagnosis:  LBP, sciatica    Treatment Diagnosis:  LBP, SI jt dysfunction    Insurance/Certification information:  Med mutua; Referring Physician:  Dr. Chon Venegas. Remi GUO    Plan of care signed (Y/N):      Visit# / total visits:  1    Pain level: /10     Subjective:    20 states she injured her back while working in the [employer] she was lifting a linen bag. She reported it to the employee health nurse. She felt something pull in her LB. She reported this to her superior. 2 weeks later she went to the doctor . She was on a 10# lifting  Restriction. She was able to work but just avoided lifting the linen bags. States her LB has worsened and she last worked 19 due to pain. She has more restrictions now with lifting and bending. She is to f/u with Dr. Sujatha Diaz next week. Dr. Juan Rhodes  Wanted her to have the PT and she is going to see a chiropracter.     FINDINGS:  Lumbar MRI from 2017. BONES/ALIGNMENT: There is normal alignment of the lumbar spine.  Vertebral   body heights are maintained.  No concerning marrow signal abnormality.       SPINAL CORD: Conus terminates normally at the L1-L2 level.       SOFT TISSUES: Paraspinal soft tissues are unremarkable.       L1-L2: No disc bulge or protrusion.  No spinal canal or neural foraminal   stenosis.       L2-L3: No disc bulge or protrusion.  No spinal canal or neural foraminal   stenosis.       L3-L4: No disc bulge or protrusion.  No spinal canal or neural foraminal   stenosis.       L4-L5: No disc bulge or protrusion.  No spinal canal or neural foraminal   stenosis.       L5-S1: Degenerative disc desiccation.  Annular fissure and broad-based   central disc protrusion.  No spinal canal or neural foraminal stenosis.            Impression   Annular

## 2019-07-17 NOTE — PROGRESS NOTES
linoleum surface include - slow. Quick Tests/Functional Myotome Tests:      Heel Walk (L4):able   Toe Walk (S1):able             SI Tests- standing:  March Test (Gillet Test):  Cigarette Butt Test:  Standing Flexion Test:  Sacral Sulci Tests:     Lumbar Range of Motion/Strength Testing     ROM (*denotes pain) AROM PROM MMT/RESISTED  COMMENTS   Flexion decr 50%,* uses a reacher at home. Extension       Sidebending Left Decr50%*      Sidebending Right \"             \"*      Rotation Left  \"            \"*   []   Seated  []   Standing       Rotation Right \"           \" *    []   Seated  []   Standing           Sensation/Motor Function   [x] All dermatomes WNL except as marked below   [x] All  myotomes WNL except as marked below      Dermatome Left Right Myotome Left Right   Anterior groin, 2-3 inches below ASIS (L1-L2)   Hip Flexion (L1-L2)   5 /5  5  /5   Middle third anterior thigh (L3)   Hip adduction (L3)    /5    /5   Patella and med malleolus (L4)   Knee extension (L3,4)   5 /5    5/5   Fibular head and dorsum of foot (L5)   Ankle dorsiflexion (L4)   5 /5   5 /5   Lateral side and plantar surface of foot (S1)   Hip abduction (L5)    /5    /5   Medial aspect of posterior thigh (S2)   Great toe extension (L5)   5 /5    5/5   Perianal area (S3,4)   Knee flexion (L5,S1) 5 5      Ankle plantarflexion (S1,2)    /5    /5           Comments:      Reflexes/Trunk Strength     Reflex Left Right Strength Strength   Quadriceps (L3,4) 2+ 2+ Trunk Extensors 3+   Achilles (S1,2) 2+ 2+ Gluteals 3+   Biceps (C5,6)   Abdominals 3             Flexibility     Obers:         Hip flexors/Tino:    Hamstrings:   -45 bilat. Gastrocs:    Other:      Palpation     Patient reported severe tenderness L4, L5 S1. Noted warmth   Noted increased muscle tone   Ligaments : bilat sacrosp lig.      Neg bilat sacrotub leg     Will check iliolumb lig NV    Special Tests     Lumbar Special Test Findings SI Special Test

## 2019-07-19 ENCOUNTER — APPOINTMENT (OUTPATIENT)
Dept: PHYSICAL THERAPY | Age: 49
End: 2019-07-19
Payer: COMMERCIAL

## 2019-07-24 ENCOUNTER — HOSPITAL ENCOUNTER (OUTPATIENT)
Dept: PHYSICAL THERAPY | Age: 49
Setting detail: THERAPIES SERIES
Discharge: HOME | End: 2019-07-24
Payer: COMMERCIAL

## 2019-07-24 ENCOUNTER — OFFICE VISIT (OUTPATIENT)
Dept: FAMILY MEDICINE CLINIC | Age: 49
End: 2019-07-24
Payer: COMMERCIAL

## 2019-07-24 VITALS
BODY MASS INDEX: 36.5 KG/M2 | OXYGEN SATURATION: 97 % | RESPIRATION RATE: 14 BRPM | DIASTOLIC BLOOD PRESSURE: 78 MMHG | HEART RATE: 78 BPM | WEIGHT: 226 LBS | SYSTOLIC BLOOD PRESSURE: 110 MMHG

## 2019-07-24 DIAGNOSIS — M54.5 LOW BACK PAIN, UNSPECIFIED BACK PAIN LATERALITY, UNSPECIFIED CHRONICITY, WITH SCIATICA PRESENCE UNSPECIFIED: Primary | ICD-10-CM

## 2019-07-24 PROCEDURE — 97110 THERAPEUTIC EXERCISES: CPT

## 2019-07-24 PROCEDURE — 99213 OFFICE O/P EST LOW 20 MIN: CPT | Performed by: INTERNAL MEDICINE

## 2019-07-24 PROCEDURE — 97140 MANUAL THERAPY 1/> REGIONS: CPT

## 2019-07-24 ASSESSMENT — ENCOUNTER SYMPTOMS
NAUSEA: 0
BACK PAIN: 1
VOMITING: 0

## 2019-07-29 ENCOUNTER — TELEPHONE (OUTPATIENT)
Dept: ORTHOPEDIC SURGERY | Age: 49
End: 2019-07-29

## 2019-07-29 ENCOUNTER — HOSPITAL ENCOUNTER (OUTPATIENT)
Dept: PHYSICAL THERAPY | Age: 49
Setting detail: THERAPIES SERIES
Discharge: HOME | End: 2019-07-29
Payer: COMMERCIAL

## 2019-07-29 PROCEDURE — 97140 MANUAL THERAPY 1/> REGIONS: CPT

## 2019-07-29 PROCEDURE — 97110 THERAPEUTIC EXERCISES: CPT

## 2019-07-29 NOTE — FLOWSHEET NOTE
.Outpatient Physical Therapy     [] Daily Treatment Note   [] Progress Note   [] Discharge Note      Date:  2019    Patient Name:  Everton Russo        :  1970    Restrictions/Precautions:      Diagnosis:  LBP, sciatica    Treatment Diagnosis:  LBP, SI jt dysfunction    Insurance/Certification information:  Med mutua; Referring Physician:  Dr. Reji Peacock. Remi GUO    Plan of care signed (Y/N):      Visit# / total visits:  3      Pain level: 4/10     Subjective:   19   reports she is really uncomfortable  19 reports she is not too bad. Not wearing the belt today due to increasing pain above belt after about an hour and half after visit. Reports her chiropracter told her he did not want her to wear it that it was just a crutch. Reports she has not done many ex since last visit due to her dog not leaving her alone (PTA suggested she close herself in her bed room  19 states she injured her back while working in the [email or web address] she was lifting a linen bag. She reported it to the employee health nurse. She felt something pull in her LB. She reported this to her superior. 2 weeks later she went to the doctor . She was on a 10# lifting  Restriction. She was able to work but just avoided lifting the linen bags. States her LB has worsened and she last worked 19 due to pain. She has more restrictions now with lifting and bending. She is to f/u with Dr. Kash Jean next week. Dr. Emilee Henriquez  Wanted her to have the PT and she is going to see a chiropracter.     FINDINGS:  Lumbar MRI from 2017.    BONES/ALIGNMENT: There is normal alignment of the lumbar spine.  Vertebral   body heights are maintained.  No concerning marrow signal abnormality.       SPINAL CORD: Conus terminates normally at the L1-L2 level.       SOFT TISSUES: Paraspinal soft tissues are unremarkable.       L1-L2: No disc bulge or protrusion.  No spinal canal or neural foraminal   stenosis.       L2-L3: No disc bulge or protrusion.  No spinal canal or neural foraminal   stenosis.       L3-L4: No disc bulge or protrusion.  No spinal canal or neural foraminal   stenosis.       L4-L5: No disc bulge or protrusion.  No spinal canal or neural foraminal   stenosis.       L5-S1: Degenerative disc desiccation.  Annular fissure and broad-based   central disc protrusion.  No spinal canal or neural foraminal stenosis.         Impression   Annular fissure and central disc protrusion at L5-S1 without spinal canal or   neural foraminal stenosis.               pain       Patient describes pain to be constant in central LB over her sacrum, has had a few episodes of the pain shooting up her back. Denies buttock pain or leg pain in the past 2 weeks. Patient reports    3/10 pain at rest,  6/10  At present                  Worsened by  -  Walking, prolonged standing, bending over, squatting down, climbing stairs. Improved by - lying down  On left side or on her back  Uses a body pillow for her legs. Pt. reports pain with coughing, sneezing and laughing:  Sometimes with cough  Pt. reports bowel and bladder changes:  NO   Current Functional Limitations: Working, can't move furniture to use vac. Has help getting things off the top shelf, needs help getting groceries. PLOF:  indep  Pt's sleep is affected?   Sometimes has pain but at others she sleeps well     Patient goal for therapy:   Get back to work.     Exercises:   Exercise/Equipment Resistance/Repetitions Other comments        GS in prone and supine  Hold 5 sec  3x10 2-3 x day   Hamstring stretch-bilat Hold 20 sec x 5    2x day   Saray Morgan   W/ball squeeze 3x5    Prone hip ext   3x5    TA   2x10 7/29/19                                                                                     Therapeutic Exercise:  22 min  Group Therapy:      Home Exercise Program:  givenHO    Therapeutic Activity:      Neuromuscular Re-education:      Gait:      Manual Therapy:  15  Manual lumbar distraction

## 2019-07-31 ENCOUNTER — HOSPITAL ENCOUNTER (OUTPATIENT)
Dept: PHYSICAL THERAPY | Age: 49
Setting detail: THERAPIES SERIES
Discharge: HOME | End: 2019-07-31
Payer: COMMERCIAL

## 2019-07-31 PROCEDURE — 97110 THERAPEUTIC EXERCISES: CPT

## 2019-07-31 PROCEDURE — 97140 MANUAL THERAPY 1/> REGIONS: CPT

## 2019-07-31 NOTE — FLOWSHEET NOTE
.Outpatient Physical Therapy     [] Daily Treatment Note   [] Progress Note   [] Discharge Note      Date:  2019    Patient Name:  Massimo Frias        :  1970    Restrictions/Precautions:      Diagnosis:  LBP, sciatica    Treatment Diagnosis:  LBP, SI jt dysfunction    Insurance/Certification information:  Med mutual    Referring Physician:  Dr. Estela Desai. Remi GUO    Plan of care signed (Y/N):      Visit# / total visits:  4      Pain level: 1-/10  intermit. Subjective:   19  Reports she is doing better the past 2 days. Not having constant pain. 19   reports she is really uncomfortable  19 reports she is not too bad. Not wearing the belt today due to increasing pain above belt after about an hour and half after visit. Reports her chiropracter told her he did not want her to wear it that it was just a crutch. Reports she has not done many ex since last visit due to her dog not leaving her alone (PTA suggested she close herself in her bed room  19 states she injured her back while working in the [employer] she was lifting a linen bag. She reported it to the employee health nurse. She felt something pull in her LB. She reported this to her superior. 2 weeks later she went to the doctor . She was on a 10# lifting  Restriction. She was able to work but just avoided lifting the linen bags. States her LB has worsened and she last worked 19 due to pain. She has more restrictions now with lifting and bending. She is to f/u with Dr. Christian Vang next week. Dr. Imani Gauthier  Wanted her to have the PT and she is going to see a chiropracter.     FINDINGS:  Lumbar MRI from 2017.    BONES/ALIGNMENT: There is normal alignment of the lumbar spine.  Vertebral   body heights are maintained.  No concerning marrow signal abnormality.       SPINAL CORD: Conus terminates normally at the L1-L2 level.       SOFT TISSUES: Paraspinal soft tissues are unremarkable.       L1-L2: No disc bulge or Therapeutic Exercise:   25 min  Group Therapy:      Home Exercise Program:  givenHO    Therapeutic Activity:      Neuromuscular Re-education:      Gait:      Manual Therapy:    15  Manual lumbar distraction . mobiliz with movement 3 reps x3 at these levels L4-5,L3-4, L2-3   . Improved AROM with flx and lateral flexion afterward. Sit up test negative, no METs needed         Modalities:      Timed Code Treatment Minutes:  40  Total Treatment Minutes:  39                            1 man                            2 ex    Medicare Cap Total YTD:      Treatment/Activity Tolerance:    [x] Patient tolerated treatment well [] Patient limited by fatigue   [] Patient limited by pain [] Patient limited by other medical complications   [] Other:     Prognosis: [x] Good [] Fair  [] Poor    Patient Requires Follow-up:  [x] Yes  [] No    Plan: [x] Continue per plan of care [] Alter current plan (see comments)   [] Plan of care initiated [] Hold pending MD visit [] Discharge    Plan for Next Session:  Check SI jts.  Sit up test, bridges, wall sides, side step us awith GS    See Progress Note: [] Yes  [x] No       Next due:    19     Electronically signed by:  Yaa Huber [address] [place] [place] [address]      Outpatient Physical Therapy  Progress Note      Patient Name:  Angely Oropeza      :  1970    Restrictions/Precautions:      Diagnosis:      Treatment Diagnosis:      Insurance/Certification information:      Referring Physician:      Plan of care signed (Y/N):      Visit# / total visits:  /    Pain level: /10    Progress Note covers period from:  19  To       Subjective:         Objective:  Observation:  Test measurements:       GCODEs and Functional Outcome Measure:            Assessment:  Summary:   Patient's response to treatment:      Goals:  · Progress toward previous goals:      Plan:  ·     Electronically Signed by: , PT

## 2019-08-05 ENCOUNTER — HOSPITAL ENCOUNTER (OUTPATIENT)
Dept: PHYSICAL THERAPY | Age: 49
Setting detail: THERAPIES SERIES
Discharge: HOME | End: 2019-08-05
Payer: COMMERCIAL

## 2019-08-05 NOTE — FLOWSHEET NOTE
Physical Therapy  Cancellation/No-show Note  Patient Name:  Robert Delgado  :  1970   Date:  2019  Cancels to Date: 1  No-shows to Date: 0    For today's appointment patient:  [x]  Cancelled  []  Rescheduled appointment  []  No-show     Reason given by patient:  []  Patient ill  []  Conflicting appointment  []  No transportation    []  Conflict with work  []  No reason given  []  Other:     Comments:      Electronically signed byPalmira Hurst, [id number]

## 2019-08-07 ENCOUNTER — OFFICE VISIT (OUTPATIENT)
Dept: ORTHOPEDIC SURGERY | Age: 49
End: 2019-08-07
Payer: COMMERCIAL

## 2019-08-07 ENCOUNTER — APPOINTMENT (OUTPATIENT)
Dept: PHYSICAL THERAPY | Age: 49
End: 2019-08-07
Payer: COMMERCIAL

## 2019-08-07 VITALS
SYSTOLIC BLOOD PRESSURE: 102 MMHG | HEART RATE: 76 BPM | DIASTOLIC BLOOD PRESSURE: 61 MMHG | BODY MASS INDEX: 36.32 KG/M2 | HEIGHT: 66 IN | WEIGHT: 225.97 LBS

## 2019-08-07 DIAGNOSIS — M54.5 LOW BACK PAIN, UNSPECIFIED BACK PAIN LATERALITY, UNSPECIFIED CHRONICITY, WITH SCIATICA PRESENCE UNSPECIFIED: Primary | ICD-10-CM

## 2019-08-07 DIAGNOSIS — M51.26 HNP (HERNIATED NUCLEUS PULPOSUS), LUMBAR: ICD-10-CM

## 2019-08-07 PROCEDURE — 99214 OFFICE O/P EST MOD 30 MIN: CPT | Performed by: ORTHOPAEDIC SURGERY

## 2019-08-07 RX ORDER — BACLOFEN 10 MG/1
10 TABLET ORAL 2 TIMES DAILY
Qty: 60 TABLET | Refills: 1 | Status: ON HOLD | OUTPATIENT
Start: 2019-08-07 | End: 2019-09-10

## 2019-08-07 ASSESSMENT — ENCOUNTER SYMPTOMS
EYES NEGATIVE: 1
SHORTNESS OF BREATH: 0
CONSTIPATION: 0
COUGH: 0
ABDOMINAL PAIN: 0
VOMITING: 0
BACK PAIN: 1
NAUSEA: 0
ALLERGIC/IMMUNOLOGIC NEGATIVE: 1
DIARRHEA: 0
WHEEZING: 0
SORE THROAT: 0

## 2019-08-07 NOTE — PROGRESS NOTES
Patient Name: Darcy Vu  : 1970  DOS: 2019        Chief Complaint:   Chief Complaint   Patient presents with    Back Pain     Lumbar Spine     Good and bad days. Better with chiropractic intervention. Pain in the back has limited her abilty to get back to work. No radiation down to the legs. Pain in the lumbar base. Previously. History of Present Illness:  Darcy Vu is a 53 y.o. female who presents with a chief complaint of chronic low back pain. Patient notes that she has been dealing with a workers comp related injury since . Patient notes she works as a  with [employer] and one day while she was emptying linen baskets she went to lift the larger linen basket to place it were prolonged and felt a pull in her low back as she twisted around with the basket. Patient notes that she has had ongoing issues with pain in the lumbar spine and to surrounding musculature since that time. She notes that she did not report the injury right away she did wait 2 weeks assuming that it was a simple muscular injury and that it would fade and passed quickly however it did not. She notes continued difficulty with performing her job functions and did eventually see occupational health where she was placed on 10 pound lifting restrictions and limited to 4 hours of standing or walking at a time. Patient notes that she was denied physical therapy and chiropractic intervention is only been treated with medication management with limited to no success. Patient notes she has been given muscle relaxers anti-inflammatories and opioid pain medications that have had limited improvement in her pain. She notes her pain rate ranges from a 4-5 to a 10 out of 10 depending on the day. Patient denies radicular symptoms at the present time no numbness burning or radiating pains down the legs.   Patient notes that she has been off work taking a medical leave of absence since  and

## 2019-08-07 NOTE — LETTER
[facility] [address]  Phone: [phone]  Fax: [phone]    Genna Cardoso MD        August 7, 2019     Patient: Bharti Trevizo   YOB: 1970   Date of Visit: 8/7/2019       To Whom It May Concern: It is my medical opinion that Bharti Trevizo should remain out of work until 09/09/19. If you have any questions or concerns, please don't hesitate to call.     Sincerely,        Genna Cardoso MD

## 2019-08-09 ENCOUNTER — HOSPITAL ENCOUNTER (OUTPATIENT)
Dept: PHYSICAL THERAPY | Age: 49
Setting detail: THERAPIES SERIES
Discharge: HOME | End: 2019-08-09
Payer: COMMERCIAL

## 2019-08-09 PROCEDURE — 97110 THERAPEUTIC EXERCISES: CPT

## 2019-08-12 ENCOUNTER — TELEPHONE (OUTPATIENT)
Dept: PULMONOLOGY | Age: 49
End: 2019-08-12

## 2019-08-13 ENCOUNTER — HOSPITAL ENCOUNTER (OUTPATIENT)
Dept: PHYSICAL THERAPY | Age: 49
Setting detail: THERAPIES SERIES
Discharge: HOME | End: 2019-08-13
Payer: COMMERCIAL

## 2019-08-13 PROCEDURE — 97140 MANUAL THERAPY 1/> REGIONS: CPT

## 2019-08-13 PROCEDURE — 97110 THERAPEUTIC EXERCISES: CPT

## 2019-08-14 ENCOUNTER — OFFICE VISIT (OUTPATIENT)
Dept: PULMONOLOGY | Age: 49
End: 2019-08-14
Payer: COMMERCIAL

## 2019-08-14 VITALS
HEIGHT: 66 IN | SYSTOLIC BLOOD PRESSURE: 108 MMHG | DIASTOLIC BLOOD PRESSURE: 68 MMHG | WEIGHT: 227 LBS | BODY MASS INDEX: 36.48 KG/M2 | HEART RATE: 62 BPM | OXYGEN SATURATION: 98 %

## 2019-08-14 DIAGNOSIS — K21.9 GASTROESOPHAGEAL REFLUX DISEASE, ESOPHAGITIS PRESENCE NOT SPECIFIED: Chronic | ICD-10-CM

## 2019-08-14 DIAGNOSIS — G47.33 OBSTRUCTIVE APNEA: Primary | Chronic | ICD-10-CM

## 2019-08-14 DIAGNOSIS — E66.9 NON MORBID OBESITY, UNSPECIFIED OBESITY TYPE: Chronic | ICD-10-CM

## 2019-08-14 PROCEDURE — 1036F TOBACCO NON-USER: CPT | Performed by: NURSE PRACTITIONER

## 2019-08-14 PROCEDURE — G8427 DOCREV CUR MEDS BY ELIG CLIN: HCPCS | Performed by: NURSE PRACTITIONER

## 2019-08-14 PROCEDURE — G8417 CALC BMI ABV UP PARAM F/U: HCPCS | Performed by: NURSE PRACTITIONER

## 2019-08-14 PROCEDURE — 99214 OFFICE O/P EST MOD 30 MIN: CPT | Performed by: NURSE PRACTITIONER

## 2019-08-14 ASSESSMENT — SLEEP AND FATIGUE QUESTIONNAIRES
HOW LIKELY ARE YOU TO NOD OFF OR FALL ASLEEP IN A CAR, WHILE STOPPED FOR A FEW MINUTES IN TRAFFIC: 2
HOW LIKELY ARE YOU TO NOD OFF OR FALL ASLEEP WHILE LYING DOWN TO REST IN THE AFTERNOON WHEN CIRCUMSTANCES PERMIT: 1
HOW LIKELY ARE YOU TO NOD OFF OR FALL ASLEEP WHILE SITTING AND READING: 1
HOW LIKELY ARE YOU TO NOD OFF OR FALL ASLEEP WHILE SITTING QUIETLY AFTER LUNCH WITHOUT ALCOHOL: 0
HOW LIKELY ARE YOU TO NOD OFF OR FALL ASLEEP WHILE SITTING AND TALKING TO SOMEONE: 0
HOW LIKELY ARE YOU TO NOD OFF OR FALL ASLEEP WHILE WATCHING TV: 1
HOW LIKELY ARE YOU TO NOD OFF OR FALL ASLEEP WHEN YOU ARE A PASSENGER IN A CAR FOR AN HOUR WITHOUT A BREAK: 1
HOW LIKELY ARE YOU TO NOD OFF OR FALL ASLEEP WHILE SITTING INACTIVE IN A PUBLIC PLACE: 2
ESS TOTAL SCORE: 8

## 2019-08-14 ASSESSMENT — ENCOUNTER SYMPTOMS
EYE PAIN: 0
SINUS PRESSURE: 0
APNEA: 0
ABDOMINAL DISTENTION: 0
EYE REDNESS: 0
ABDOMINAL PAIN: 0
SHORTNESS OF BREATH: 0
COUGH: 0
RHINORRHEA: 0

## 2019-08-14 NOTE — ASSESSMENT & PLAN NOTE
Reviewed compliance download with pt. Supplies and parts as needed for her machine. These are medically necessary. Continue medications per her PCP and other physicians. Limit caffeine use after 3pm.  Encouraged her to work on weight loss through diet and exercise. Diagnoses of Non morbid obesity, unspecified obesity type and Gastroesophageal reflux disease, esophagitis presence not specified were pertinent to this visit. The chronic medical conditions listed are directly related to the primary diagnosis listed above. The management of the primary diagnosis affects the secondary diagnosis and vice versa.

## 2019-08-15 DIAGNOSIS — S39.012A STRAIN OF LUMBAR REGION, INITIAL ENCOUNTER: ICD-10-CM

## 2019-08-15 DIAGNOSIS — M79.605 LOW BACK PAIN RADIATING TO LEFT LEG: ICD-10-CM

## 2019-08-15 DIAGNOSIS — M54.50 LOW BACK PAIN RADIATING TO LEFT LEG: ICD-10-CM

## 2019-08-15 RX ORDER — NAPROXEN 500 MG/1
500 TABLET ORAL 2 TIMES DAILY PRN
Qty: 60 TABLET | Refills: 0 | Status: SHIPPED | OUTPATIENT
Start: 2019-08-15 | End: 2020-03-02

## 2019-08-16 ENCOUNTER — TELEPHONE (OUTPATIENT)
Dept: ORTHOPEDIC SURGERY | Age: 49
End: 2019-08-16

## 2019-08-16 NOTE — TELEPHONE ENCOUNTER
Pt is calling about her mri being approved. She said that she has to heard from anyone, I told her to call central scheduling. She stated she is going somewhere else for MRI. She also wondering about injections.     Did we also receive paper work from [employer] from her

## 2019-08-19 ENCOUNTER — HOSPITAL ENCOUNTER (OUTPATIENT)
Dept: PHYSICAL THERAPY | Age: 49
Setting detail: THERAPIES SERIES
Discharge: HOME | End: 2019-08-19
Payer: COMMERCIAL

## 2019-08-19 ENCOUNTER — TELEPHONE (OUTPATIENT)
Dept: ORTHOPEDIC SURGERY | Age: 49
End: 2019-08-19

## 2019-08-21 ENCOUNTER — HOSPITAL ENCOUNTER (OUTPATIENT)
Dept: PHYSICAL THERAPY | Age: 49
Setting detail: THERAPIES SERIES
Discharge: HOME | End: 2019-08-21
Payer: COMMERCIAL

## 2019-08-21 PROCEDURE — 97140 MANUAL THERAPY 1/> REGIONS: CPT

## 2019-08-21 PROCEDURE — 97110 THERAPEUTIC EXERCISES: CPT

## 2019-08-21 NOTE — FLOWSHEET NOTE
.Outpatient Physical Therapy     [x] Daily Treatment Note   [] Progress Note   [] Discharge Note      Date:  2019    Patient Name:  Clyde Alcala        :  1970    Restrictions/Precautions:      Diagnosis:  LBP, sciatica    Treatment Diagnosis:  LBP, SI jt dysfunction    Insurance/Certification information:  Med mutual    Referring Physician:  Dr. Tommy Otoole. Remi GUO    Plan of care signed (Y/N):      Visit# / total visits:  -      Pain level: 1-4/10     Subjective:  19 reports she is having a bad wk. Reports she was dragging leg when grocery shopping. Back flare up started last Friday and yesterday her knee really hurt on the same side it was making her cry. Reports she was unable to do her ex very much in the last wk   19 reports having a bad couple of days  19 only having back pain with standing and doing dishes for awhile. Is doing them daily to decrease her time at the sink. 19  Reports she is doing better the past 2 days. Not having constant pain. 19   reports she is really uncomfortable  19 reports she is not too bad. Not wearing the belt today due to increasing pain above belt after about an hour and half after visit. Reports her chiropracter told her he did not want her to wear it that it was just a crutch. Reports she has not done many ex since last visit due to her dog not leaving her alone (PTA suggested she close herself in her bed room  19 states she injured her back while working in the [employer] she was lifting a linen bag. She reported it to the employee health nurse. She felt something pull in her LB. She reported this to her superior. 2 weeks later she went to the doctor . She was on a 10# lifting  Restriction. She was able to work but just avoided lifting the linen bags. States her LB has worsened and she last worked 19 due to pain. She has more restrictions now with lifting and bending.   She is to f/u with Dr. Sabra West next

## 2019-08-27 ENCOUNTER — HOSPITAL ENCOUNTER (OUTPATIENT)
Dept: PHYSICAL THERAPY | Age: 49
Setting detail: THERAPIES SERIES
Discharge: HOME | End: 2019-08-27
Payer: COMMERCIAL

## 2019-08-28 ENCOUNTER — HOSPITAL ENCOUNTER (OUTPATIENT)
Dept: PHYSICAL THERAPY | Age: 49
Setting detail: THERAPIES SERIES
Discharge: HOME | End: 2019-08-28
Payer: COMMERCIAL

## 2019-08-28 PROCEDURE — 97140 MANUAL THERAPY 1/> REGIONS: CPT

## 2019-08-28 PROCEDURE — 97110 THERAPEUTIC EXERCISES: CPT

## 2019-08-28 PROCEDURE — 97150 GROUP THERAPEUTIC PROCEDURES: CPT

## 2019-08-28 NOTE — PROGRESS NOTES
Prognosis: [x] Good [] Fair  [] Poor    Patient Requires Follow-up:  [x] Yes  [] No    Plan: [x] Continue per plan of care [] Alter current plan (see comments)   [] Plan of care initiated [] Hold pending MD visit [] Discharge    Plan for Next Session:   above    See Progress Note: [x] Yes  [] No       Next due:    8/16/19     Electronically signed by:  Conor Stone [id number]      Outpatient Physical Therapy  Progress Note      Progress Note covers period from:  7/17/19  To 8/28/19      Objective:  Observation:  Test measurements:     core strength 3+-4-/5      Assessment:  Summary:   Patient's response to treatment:      Goals:GOALS  GCode:     Short Term Goals (  2-3  weeks) Long Term Goals ( 4-8  weeks)   1). Establish HEP 1). (I) HEP   2). decr pain 25-50% 2). decr pain 75%   3). improve core strength 1/3 mmt 3). improve core strenght to 4/5   4). ambulate at average pace 4). neg SI jt test for 2 visits   5). 5). AROM- lumbar WFL   6). 6).        · Progress toward previous goals: STG met and LTG 4 met     Plan:  ·     Electronically Signed by: Heidy Chase [address] [place] [address]

## 2019-08-29 ENCOUNTER — HOSPITAL ENCOUNTER (OUTPATIENT)
Dept: MRI IMAGING | Age: 49
Discharge: HOME | End: 2019-08-29
Payer: COMMERCIAL

## 2019-08-29 DIAGNOSIS — M54.5 LOW BACK PAIN, UNSPECIFIED BACK PAIN LATERALITY, UNSPECIFIED CHRONICITY, WITH SCIATICA PRESENCE UNSPECIFIED: ICD-10-CM

## 2019-08-29 DIAGNOSIS — M51.26 HNP (HERNIATED NUCLEUS PULPOSUS), LUMBAR: ICD-10-CM

## 2019-08-29 PROCEDURE — 72148 MRI LUMBAR SPINE W/O DYE: CPT

## 2019-09-04 ENCOUNTER — HOSPITAL ENCOUNTER (OUTPATIENT)
Dept: PHYSICAL THERAPY | Age: 49
Setting detail: THERAPIES SERIES
Discharge: HOME | End: 2019-09-04
Payer: COMMERCIAL

## 2019-09-04 PROCEDURE — 97110 THERAPEUTIC EXERCISES: CPT

## 2019-09-04 PROCEDURE — 97140 MANUAL THERAPY 1/> REGIONS: CPT

## 2019-09-04 NOTE — PROGRESS NOTES
.Outpatient Physical Therapy     [x] Daily Treatment Note   [x] Progress Note   [] Discharge Note      Date:  2019    Patient Name:  Amparo Baker        :  1970    Restrictions/Precautions:      Diagnosis:  LBP, sciatica    Treatment Diagnosis:  LBP, SI jt dysfunction    Insurance/Certification information:  Med mutual    Referring Physician:  Dr. Reji Peacock. Remi GUO    Plan of care signed (Y/N):      Visit# / total visits:  -      Pain level: 1-3/10 Back     010 neck    Subjective:  19 reports back pain never goes away but overall is easily 75%  Improved since start of therapy. Reports she still has to alter how she does some things and is not walking as much as she should. Able to walk further but is not walking at her normal pace. 19 reports he is not feeling well, reports her neck is hurting a lot, back is still the same as last visit. Reports though that overall her back is about 60% improved since starting therapy  19 reports she is having a bad wk. Reports she was dragging leg when grocery shopping. Back flare up started last Friday and yesterday her knee really hurt on the same side it was making her cry. Reports she was unable to do her ex very much in the last wk   19 reports having a bad couple of days  19 only having back pain with standing and doing dishes for awhile. Is doing them daily to decrease her time at the sink. 19  Reports she is doing better the past 2 days. Not having constant pain. 19   reports she is really uncomfortable  19 reports she is not too bad. Not wearing the belt today due to increasing pain above belt after about an hour and half after visit. Reports her chiropracter told her he did not want her to wear it that it was just a crutch.    Reports she has not done many ex since last visit due to her dog not leaving her alone (PTA suggested she close herself in her bed room  19 states she injured her back her back  Uses a body pillow for her legs. Pt. reports pain with coughing, sneezing and laughing:  Sometimes with cough  Pt. reports bowel and bladder changes:  NO   Current Functional Limitations: Working, can't move furniture to use vac. Has help getting things off the top shelf, needs help getting groceries. PLOF:  indep  Pt's sleep is affected? Sometimes has pain but at others she sleeps well     Patient goal for therapy:   Get back to work.         Outpatient Physical Therapy  Progress Note      Progress Note covers period from:  7/17/19  To 8/28/19 to 9/4/19      Objective:  Observation: negative sit up tests greater than 2 visits  Test measurements:    Core strength  4- to 4/5  AROM of trunk:  Flex decr 25%, rot decr 10% bilaterally, SB decr 5-10% bilaterally    Assessment:  Summary:   Patient's response to treatment:      Goals:GOALS  GCode:     Short Term Goals (  2-3  weeks) Long Term Goals ( 4-8  weeks)   1). Establish HEP 1). (I) HEP   2). decr pain 25-50% 2). decr pain 75%   3). improve core strength 1/3 mmt 3). improve core strenght to 4/5   4). ambulate at average pace 4). neg SI jt test for 2 visits   5). 5). AROM- lumbar WFL   6). 6).        · Progress toward previous goals: STG met and LTG 1-4 met     Plan:  ·     Electronically Signed by: Deneen Haas [address] [place] [address], [id number]

## 2019-09-05 ENCOUNTER — OFFICE VISIT (OUTPATIENT)
Dept: ORTHOPEDIC SURGERY | Age: 49
End: 2019-09-05
Payer: COMMERCIAL

## 2019-09-05 VITALS
SYSTOLIC BLOOD PRESSURE: 106 MMHG | DIASTOLIC BLOOD PRESSURE: 49 MMHG | HEART RATE: 68 BPM | BODY MASS INDEX: 36.49 KG/M2 | HEIGHT: 66 IN | WEIGHT: 227.07 LBS

## 2019-09-05 DIAGNOSIS — M47.816 LOCALIZED OSTEOARTHRITIS OF LUMBAR SPINE: Primary | ICD-10-CM

## 2019-09-05 PROCEDURE — 1036F TOBACCO NON-USER: CPT | Performed by: ORTHOPAEDIC SURGERY

## 2019-09-05 PROCEDURE — G8427 DOCREV CUR MEDS BY ELIG CLIN: HCPCS | Performed by: ORTHOPAEDIC SURGERY

## 2019-09-05 PROCEDURE — G8417 CALC BMI ABV UP PARAM F/U: HCPCS | Performed by: ORTHOPAEDIC SURGERY

## 2019-09-05 PROCEDURE — 99214 OFFICE O/P EST MOD 30 MIN: CPT | Performed by: ORTHOPAEDIC SURGERY

## 2019-09-05 ASSESSMENT — ENCOUNTER SYMPTOMS
ALLERGIC/IMMUNOLOGIC NEGATIVE: 1
BACK PAIN: 1
SORE THROAT: 0
WHEEZING: 0
COUGH: 0
CONSTIPATION: 0
DIARRHEA: 0
EYES NEGATIVE: 1
SHORTNESS OF BREATH: 0
NAUSEA: 0
ABDOMINAL PAIN: 0
VOMITING: 0

## 2019-09-06 ENCOUNTER — APPOINTMENT (OUTPATIENT)
Dept: PHYSICAL THERAPY | Age: 49
End: 2019-09-06
Payer: COMMERCIAL

## 2019-09-06 ENCOUNTER — OFFICE VISIT (OUTPATIENT)
Dept: ORTHOPEDIC SURGERY | Age: 49
End: 2019-09-06
Payer: COMMERCIAL

## 2019-09-06 VITALS
BODY MASS INDEX: 36.49 KG/M2 | DIASTOLIC BLOOD PRESSURE: 74 MMHG | WEIGHT: 227.07 LBS | HEIGHT: 66 IN | HEART RATE: 65 BPM | SYSTOLIC BLOOD PRESSURE: 110 MMHG

## 2019-09-06 DIAGNOSIS — M51.27 HERNIATED NUCLEUS PULPOSUS, L5-S1: Primary | ICD-10-CM

## 2019-09-06 PROCEDURE — G8427 DOCREV CUR MEDS BY ELIG CLIN: HCPCS | Performed by: PHYSICAL MEDICINE & REHABILITATION

## 2019-09-06 PROCEDURE — 99243 OFF/OP CNSLTJ NEW/EST LOW 30: CPT | Performed by: PHYSICAL MEDICINE & REHABILITATION

## 2019-09-06 PROCEDURE — G8417 CALC BMI ABV UP PARAM F/U: HCPCS | Performed by: PHYSICAL MEDICINE & REHABILITATION

## 2019-09-06 NOTE — PROGRESS NOTES
New Patient: SPINE    CHIEF COMPLAINT:    Chief Complaint   Patient presents with    Back Pain     NP Lumbar pain referred by Dr. Leroy Gan:                The patient is a 53 y.o. female seen in consultation by Dr Mathew Huitron for back pain     Patient relates the onset of her back pain to work injury. She works in housekeeping at [employer] ([employer] On April 5, she was emptying a linen basket felt pain in her back suddenly. She is dealt with chronic axial back pain. She is been through 9 visits of therapy NSAIDs and muscle relaxers. She has incomplete relief. She had an MRI which showed a disc protrusion L5-S1      She reports a prior history of intermittent chronic back pain. She has several episodes of sciatica that is resolved. However, she reports being pain-free prior to specific injury. She tells me her Worker's Comp. claim is been denied    Past Medical History:   Diagnosis Date    Anxiety     Chronic back pain     Headache(784.0)     Hx of blood clots     march 2016-was on xarelto for 6months    Obstructive apnea     Seizures ([facility] [address]     states no longer  has them-last one was in 2013-june          Pain Assessment  Location of Pain: Back  Severity of Pain: 2  Quality of Pain: Aching  Duration of Pain: Persistent  Frequency of Pain: Constant  Aggravating Factors: Standing, Walking  Limiting Behavior: Yes  Relieving Factors: Rest  Result of Injury: Yes  Work-Related Injury: No  Are there other pain locations you wish to document?: No    The pain assessment was noted & reviewed in the medical record today. Current/Past Treatment:   · Physical Therapy: X9 visits  · Visit  · Chiropractic:     · Injection:     Medications:            NSAIDS: Naprosyn            Muscle relaxer:    Yes            Steriods:              Neuropathic medications:              Opioids:            Other:   · Surgery/Consult:    Work Status/Functionality:     Past Medical History: Medical history Denies unexplained weight loss, fevers, chills or fatigue  NEUROLOGICAL: Denies unsteady gait or progressive weakness  MUSCULOSKELETAL: Denies joint swelling or redness  PSYCHOLOGICAL: Denies anxiety, depression   SKIN: Denies skin changes, delayed healing, rash, itching   HEMATOLOGIC: Denies easy bleeding or bruising  ENDOCRINE: Denies excessive thirst, urination, heat/cold  RESPIRATORY: Denies current dyspnea, cough  GI: Denies nausea, vomiting, diarrhea   : Denies bowel or bladder issues       PHYSICAL EXAM:    Vitals: Blood pressure 110/74, pulse 65, height 5' 5.98\" (1.676 m), weight 227 lb 1.2 oz (103 kg), last menstrual period 11/02/2016, not currently breastfeeding. GENERAL EXAM:  · General Apparence: Patient is adequately groomed with no evidence of malnutrition. · Orientation: The patient is oriented to time, place and person. · Mood & Affect:The patient's mood and affect are appropriate   · Vascular: Examination reveals no swelling tenderness in upper or lower extremities. Good capillary refill  · Lymphatic: The lymphatic examination bilaterally reveals all areas to be without enlargement or induration  · Sensation: Sensation is intact without deficit  · Coordination/Balance: Good coordination     LUMBAR/SACRAL EXAMINATION:  · Inspection: Local inspection shows no step-off or bruising. Lumbar alignment is normal.  Sagittal and Coronal balance is neutral.      · Palpation:   No evidence of tenderness at the midline. No tenderness bilaterally at the paraspinal or trochanters. There is no step-off or paraspinal spasm. · Range of Motion: Mild loss of flexion and extend  · Strength:   Strength testing is 5/5 in all muscle groups tested. · Special Tests:   Straight leg raise and crossed SLR negative. Leg length and pelvis level.  0 out of 5 Minesh's signs. · Skin: There are no rashes, ulcerations or lesions. · Reflexes: Reflexes are symmetrically 2+ at the patellar and ankle tendons. Clonus absent bilaterally at the feet. · Gait & station: Normal gait  · Additional Examinations:   · RIGHT LOWER EXTREMITY: Inspection/examination of the right lower extremity does not show any tenderness, deformity or injury. Range of motion is full. There is no gross instability. There are no rashes, ulcerations or lesions. Strength and tone are normal.  ·   · LEFT LOWER EXTREMITY:  Inspection/examination of the left lower extremity does not show any tenderness, deformity or injury. Range of motion is full. There is no gross instability. There are no rashes, ulcerations or lesions.   Strength and tone are normal.    Diagnostic Testing:      I independently reviewed her lumbar MRI showing a central disc protrusion with mild loss of height L5-S1    X-rays show mild discogenic spondylosis without acute fracture    Impression:    Lumbar strain and central L5-S1 HNP, chronic back pain x5 months        Plan:     Midline L5-S1 interlaminar epidural, #1  We discussed variability for pain relief with axial pain versus referred leg pain  However, to resume next step if she continues to have disability and pain despite ongoing appropriate treatment    MASOUD Barrera

## 2019-09-06 NOTE — LETTER
 Multiple Vitamins-Minerals (MULTIVITAMIN ADULT PO) Take 1 tablet by mouth daily       No current facility-administered medications for this visit. [facility]                     ______________________________________________________________________      [address] [place]      1. Admit to preop. 2. Start IV 1000 ml LR at [facility] or _____ml/hr for planned conscious sedation     3. May inject 1 % Lidocaine 0.1 ml Intradermal to numb IV site     4. Protime/INR if patient is on Coumadin     5. Urine Pregnancy Test (females only) - 12 -50 years     6. Accu Check Glucose if diabetic. Notify physician if <80 or >250.      7. Sedate all neurotomies          ______________________________________________________________________    POST-OPERATIVE ORDERS - DR. MUNOZ      1. Admit to Post Op Phase 2     2. Implement Standards of Care for Phase 2 Post Op     3. Check Site - May discharge when site is free of bleeding     4. Discharge to home after meets Phase 2 criteria     5. Discharge cervical patients after 30 minutes and when meets Phase 2 criteria. 6. Give discharge instruction sheet     7. For Diabetic patient, if blood sugar less than 80 in preop,          Recheck blood sugar in Post Op. 8. Discontinue IV     9.  For Nausea may give Zofran 4 MG IV/IM/ODT           ______________________________________________________________________    Silver Marci     1970 9/6/19 8:52 AM

## 2019-09-06 NOTE — PROGRESS NOTES
orders of the defined types were placed in this encounter. Impression:     ICD-10-CM    1. Localized osteoarthritis of lumbar spine M47.816        Treatment Plan:     - possible aggravation of preexisting back injury or new onset acute lumbosacral sprain which should be covered by workers compensation. prior to the time of injury that was reported the patient was pain free and able to preform job functions with no limitations. MRI with noted bilateral foraminal stenosis and lumbar disc disease     Continue with therapy. Pending evaluation with Dr Jareth Reyes for consideration of epidural injection.         Judge Braydon MD

## 2019-09-09 ENCOUNTER — TELEPHONE (OUTPATIENT)
Dept: ORTHOPEDIC SURGERY | Age: 49
End: 2019-09-09

## 2019-09-10 ENCOUNTER — HOSPITAL ENCOUNTER (OUTPATIENT)
Dept: PHYSICAL THERAPY | Age: 49
Setting detail: THERAPIES SERIES
Discharge: HOME | End: 2019-09-10
Payer: COMMERCIAL

## 2019-09-10 ENCOUNTER — HOSPITAL ENCOUNTER (OUTPATIENT)
Age: 49
Setting detail: OUTPATIENT SURGERY
Discharge: HOME | End: 2019-09-10
Attending: PHYSICAL MEDICINE & REHABILITATION | Admitting: PHYSICAL MEDICINE & REHABILITATION
Payer: COMMERCIAL

## 2019-09-10 VITALS
HEART RATE: 77 BPM | RESPIRATION RATE: 16 BRPM | SYSTOLIC BLOOD PRESSURE: 113 MMHG | WEIGHT: 227 LBS | DIASTOLIC BLOOD PRESSURE: 74 MMHG | OXYGEN SATURATION: 100 % | BODY MASS INDEX: 35.63 KG/M2 | TEMPERATURE: 97.1 F | HEIGHT: 67 IN

## 2019-09-10 PROCEDURE — 2709999900 HC NON-CHARGEABLE SUPPLY: Performed by: PHYSICAL MEDICINE & REHABILITATION

## 2019-09-10 PROCEDURE — 3600000002 HC SURGERY LEVEL 2 BASE: Performed by: PHYSICAL MEDICINE & REHABILITATION

## 2019-09-10 PROCEDURE — 6360000004 HC RX CONTRAST MEDICATION: Performed by: PHYSICAL MEDICINE & REHABILITATION

## 2019-09-10 PROCEDURE — 7100000011 HC PHASE II RECOVERY - ADDTL 15 MIN: Performed by: PHYSICAL MEDICINE & REHABILITATION

## 2019-09-10 PROCEDURE — 7100000010 HC PHASE II RECOVERY - FIRST 15 MIN: Performed by: PHYSICAL MEDICINE & REHABILITATION

## 2019-09-10 PROCEDURE — 6360000002 HC RX W HCPCS: Performed by: PHYSICAL MEDICINE & REHABILITATION

## 2019-09-10 PROCEDURE — 2500000003 HC RX 250 WO HCPCS: Performed by: PHYSICAL MEDICINE & REHABILITATION

## 2019-09-10 RX ORDER — LIDOCAINE HYDROCHLORIDE 10 MG/ML
INJECTION, SOLUTION EPIDURAL; INFILTRATION; INTRACAUDAL; PERINEURAL PRN
Status: DISCONTINUED | OUTPATIENT
Start: 2019-09-10 | End: 2019-09-10 | Stop reason: ALTCHOICE

## 2019-09-10 ASSESSMENT — PAIN SCALES - GENERAL: PAINLEVEL_OUTOF10: 0

## 2019-09-10 ASSESSMENT — PAIN DESCRIPTION - DESCRIPTORS: DESCRIPTORS: ACHING;SORE

## 2019-09-10 ASSESSMENT — PAIN - FUNCTIONAL ASSESSMENT
PAIN_FUNCTIONAL_ASSESSMENT: 0-10
PAIN_FUNCTIONAL_ASSESSMENT: PREVENTS OR INTERFERES WITH MANY ACTIVE NOT PASSIVE ACTIVITIES

## 2019-09-10 NOTE — FLOWSHEET NOTE
Physical Therapy  Cancellation/No-show Note  Patient Name:  Chapo Sheldon  :  1970   Date:  9/10/2019  Cancels to Date: 4     No-shows to Date: 0    For today's appointment patient:  [x]  Cancelled  []  Rescheduled appointment  []  No-show     Reason given by patient:  []  Patient ill  []  Conflicting appointment  []  No transportation    []  Conflict with work  []  No reason given  []  Other:     Comments:      Electronically signed byCAREY [id number]

## 2019-09-10 NOTE — LETTER
[facility]  [address] [facility] [address]  Phone: [phone]    No name on file. September 10, 2019     Patient: Raquel Moon   YOB: 1970   Date of Visit: 9/6/2019       To Whom It May Concern:    Colton Julian was here for a steroid epidural injection today. If you have any questions or concerns, please don't hesitate to call.     Sincerely,        Dr. Reynolds Daily

## 2019-09-12 ENCOUNTER — TELEPHONE (OUTPATIENT)
Dept: ORTHOPEDIC SURGERY | Age: 49
End: 2019-09-12

## 2019-09-12 ENCOUNTER — CARE COORDINATION (OUTPATIENT)
Dept: CARE COORDINATION | Age: 49
End: 2019-09-12

## 2019-09-12 SDOH — ECONOMIC STABILITY: FOOD INSECURITY: WITHIN THE PAST 12 MONTHS, YOU WORRIED THAT YOUR FOOD WOULD RUN OUT BEFORE YOU GOT MONEY TO BUY MORE.: SOMETIMES TRUE

## 2019-09-12 SDOH — HEALTH STABILITY: MENTAL HEALTH
STRESS IS WHEN SOMEONE FEELS TENSE, NERVOUS, ANXIOUS, OR CAN'T SLEEP AT NIGHT BECAUSE THEIR MIND IS TROUBLED. HOW STRESSED ARE YOU?: RATHER MUCH

## 2019-09-12 SDOH — ECONOMIC STABILITY: TRANSPORTATION INSECURITY
IN THE PAST 12 MONTHS, HAS THE LACK OF TRANSPORTATION KEPT YOU FROM MEDICAL APPOINTMENTS OR FROM GETTING MEDICATIONS?: YES

## 2019-09-12 SDOH — ECONOMIC STABILITY: TRANSPORTATION INSECURITY
IN THE PAST 12 MONTHS, HAS LACK OF TRANSPORTATION KEPT YOU FROM MEETINGS, WORK, OR FROM GETTING THINGS NEEDED FOR DAILY LIVING?: YES

## 2019-09-13 ENCOUNTER — APPOINTMENT (OUTPATIENT)
Dept: PHYSICAL THERAPY | Age: 49
End: 2019-09-13
Payer: COMMERCIAL

## 2019-09-13 ENCOUNTER — CARE COORDINATION (OUTPATIENT)
Dept: CARE COORDINATION | Age: 49
End: 2019-09-13

## 2019-09-16 ENCOUNTER — HOSPITAL ENCOUNTER (OUTPATIENT)
Dept: PHYSICAL THERAPY | Age: 49
Setting detail: THERAPIES SERIES
Discharge: HOME | End: 2019-09-16
Payer: COMMERCIAL

## 2019-09-17 ENCOUNTER — TELEPHONE (OUTPATIENT)
Dept: ORTHOPEDIC SURGERY | Age: 49
End: 2019-09-17

## 2019-09-18 ENCOUNTER — HOSPITAL ENCOUNTER (OUTPATIENT)
Dept: PHYSICAL THERAPY | Age: 49
Setting detail: THERAPIES SERIES
Discharge: HOME | End: 2019-09-18
Payer: COMMERCIAL

## 2019-09-18 PROCEDURE — 97140 MANUAL THERAPY 1/> REGIONS: CPT

## 2019-09-18 PROCEDURE — 97110 THERAPEUTIC EXERCISES: CPT

## 2019-09-18 NOTE — PROGRESS NOTES
.Outpatient Physical Therapy     [x] Daily Treatment Note   [x] Progress Note   [] Discharge Note      Date:  2019    Patient Name:  Teri Kaur        :  1970    Restrictions/Precautions:      Diagnosis:  LBP, sciatica    Treatment Diagnosis:  LBP, SI jt dysfunction    Insurance/Certification information:  Med mutual    Referring Physician:  Dr. Nicole GUO    Plan of care signed (Y/N):      Visit# / total visits:  10/8-      Pain level: 1-3/10 Back     0/10 neck    Subjective:  19 she had an epidural injection 8 days ago. She has not improved with it. The past 3 nights she has had more pain in new places: in her lower neck, left thoracic, and R buttock. Her daytime pain is the same : central LBP with no radiation down either leg. Reviewed her more recent MRI. See below. 19 reports back pain never goes away but overall is easily 75%  Improved since start of therapy. Reports she still has to alter how she does some things and is not walking as much as she should. Able to walk further but is not walking at her normal pace. 19 reports he is not feeling well, reports her neck is hurting a lot, back is still the same as last visit. Reports though that overall her back is about 60% improved since starting therapy  19 reports she is having a bad wk. Reports she was dragging leg when grocery shopping. Back flare up started last Friday and yesterday her knee really hurt on the same side it was making her cry. Reports she was unable to do her ex very much in the last wk   19 reports having a bad couple of days  19 only having back pain with standing and doing dishes for awhile. Is doing them daily to decrease her time at the sink. 19  Reports she is doing better the past 2 days. Not having constant pain. 19   reports she is really uncomfortable  19 reports she is not too bad.   Not wearing the belt today due to increasing pain above belt

## 2019-09-18 NOTE — FLOWSHEET NOTE
after about an hour and half after visit. Reports her chiropracter told her he did not want her to wear it that it was just a crutch. Reports she has not done many ex since last visit due to her dog not leaving her alone (PTA suggested she close herself in her bed room  7/17/19 states she injured her back while working in the [employer] she was lifting a linen bag. She reported it to the employee health nurse. She felt something pull in her LB. She reported this to her superior. 2 weeks later she went to the doctor . She was on a 10# lifting  Restriction. She was able to work but just avoided lifting the linen bags. States her LB has worsened and she last worked 6/22/19 due to pain. She has more restrictions now with lifting and bending. She is to f/u with Dr. Goldie Vivas next week. Dr. Luli Alejo  Wanted her to have the PT and she is going to see a chiropracter.     FINDINGS:  Lumbar MRI from June 2017. BONES/ALIGNMENT: There is normal alignment of the lumbar spine.  Vertebral   body heights are maintained.  No concerning marrow signal abnormality.       SPINAL CORD: Conus terminates normally at the L1-L2 level.       SOFT TISSUES: Paraspinal soft tissues are unremarkable.       L1-L2: No disc bulge or protrusion.  No spinal canal or neural foraminal   stenosis.       L2-L3: No disc bulge or protrusion.  No spinal canal or neural foraminal   stenosis.       L3-L4: No disc bulge or protrusion.  No spinal canal or neural foraminal   stenosis.       L4-L5: No disc bulge or protrusion.  No spinal canal or neural foraminal   stenosis.       L5-S1: Degenerative disc desiccation.  Annular fissure and broad-based   central disc protrusion.  No spinal canal or neural foraminal stenosis.         Impression   Annular fissure and central disc protrusion at L5-S1 without spinal canal or   neural foraminal stenosis.                  pain       Patient describes pain to be constant in central LB over her sacrum, has had a few episodes of the pain shooting up her back. Denies buttock pain or leg pain in the past 2 weeks. Patient reports    3/10 pain at rest,  6/10  At present                  Worsened by  -  Walking, prolonged standing, bending over, squatting down, climbing stairs. Improved by - lying down  On left side or on her back  Uses a body pillow for her legs. Pt. reports pain with coughing, sneezing and laughing:  Sometimes with cough  Pt. reports bowel and bladder changes:  NO   Current Functional Limitations: Working, can't move furniture to use vac. Has help getting things off the top shelf, needs help getting groceries. PLOF:  indep  Pt's sleep is affected? Sometimes has pain but at others she sleeps well     Patient goal for therapy:   Get back to work.     9/18/19 : Instruction for positioning at night for positional distraction to relieve thoracic and lumbar pain- good relief in dept. Pt then had to leave for another appt but came back later in the afternoon during an open time to complete her visit.  + sit up test on the left for overtake. R sacral sulcus deep. Performed MET to correct both. Re fit with SI belt. Re instructed with MET for HEP. Explained anatomy again. Exercises:   Use blue mat table if available -   Goal is to return to work after 9/9/19  Exercise/Equipment Resistance/Repetitions Other comments         MET for SI jts/pelvis Hip add/abd.     Hip flx/ext   Fig with SI belt To be worn when she is on her feet  GS in prone and supine  Hold 5 sec  3x10 2-3 x day   Hamstring stretch-bilat Hold 20 sec x 5    2x day   Gopi Nice   W/ball squeeze 3x5    Prone hip ext   3x5    TA   2x10 7/29/19 7/31/19  side step up with GS  3x10 bilat        Prone hip IR/ER  3x5  incr to 3x10       Prone UE/LE raise \"                 \"    8/9/19 prone plank from knees   15--20 sec x 3      Bridges- up apart together 3x10      bug 3x10      hooklying hip abd/add 2x10  incr to 3x 10      Walk 10 min 2x day Start · Progress toward previous goals: STG met and LTG 1-4 met     Plan:  ·     Electronically Signed by: Grace Ronquillo [address] [place] [address], [id number]

## 2019-09-20 ENCOUNTER — APPOINTMENT (OUTPATIENT)
Dept: PHYSICAL THERAPY | Age: 49
End: 2019-09-20
Payer: COMMERCIAL

## 2019-09-20 ENCOUNTER — CARE COORDINATION (OUTPATIENT)
Dept: OTHER | Facility: CLINIC | Age: 49
End: 2019-09-20

## 2019-09-20 RX ORDER — BACLOFEN 10 MG/1
1 TABLET ORAL 2 TIMES DAILY
COMMUNITY
Start: 2019-08-07 | End: 2020-03-02

## 2019-09-20 SDOH — ECONOMIC STABILITY: INCOME INSECURITY: HOW HARD IS IT FOR YOU TO PAY FOR THE VERY BASICS LIKE FOOD, HOUSING, MEDICAL CARE, AND HEATING?: NOT VERY HARD

## 2019-09-20 SDOH — HEALTH STABILITY: PHYSICAL HEALTH: ON AVERAGE, HOW MANY DAYS PER WEEK DO YOU ENGAGE IN MODERATE TO STRENUOUS EXERCISE (LIKE A BRISK WALK)?: 0 DAYS

## 2019-09-20 SDOH — HEALTH STABILITY: PHYSICAL HEALTH: ON AVERAGE, HOW MANY MINUTES DO YOU ENGAGE IN EXERCISE AT THIS LEVEL?: 0 MIN

## 2019-09-20 SDOH — ECONOMIC STABILITY: FOOD INSECURITY: WITHIN THE PAST 12 MONTHS, THE FOOD YOU BOUGHT JUST DIDN'T LAST AND YOU DIDN'T HAVE MONEY TO GET MORE.: SOMETIMES TRUE

## 2019-09-20 SDOH — HEALTH STABILITY: MENTAL HEALTH: HOW OFTEN DO YOU HAVE A DRINK CONTAINING ALCOHOL?: NEVER

## 2019-09-20 NOTE — CARE COORDINATION
Email resources. Ambulatory Care Coordination Assessment    Care Coordination Protocol  Program Enrollment:  Complex Care  Referral from Primary Care Provider:  No  Week 1 - Initial Assessment     Do you have all of your prescriptions and are they filled?:  Yes  Barriers to medication adherence:  Does not feel there is a need/No perceived effect  Are you able to afford your medications?:  Yes  How often do you have trouble taking your medications the way you have been told to take them?:  Sometimes I take them as prescribed. Do you have Home O2 Therapy?:  No      Ability to seek help/take action for Emergent Urgent situations i.e. fire, crime, inclement weather or health crisis. :  Independent  Ability to ambulate to restroom:  Independent  Ability handle personal hygeine needs (bathing/dressing/grooming): Independent  Ability to manage Medications: Independent  Ability to prepare Food Preparation:  Independent  Ability to maintain home (clean home, laundry):  Needs Assistance  Ability to drive and/or has transportation:  Independent  Ability to do shopping:  Independent  Ability to manage finances:   Independent  Is patient able to live independently?:  Yes     Current Housing:  Private Residence        Per the Fall Risk Screening, did the patient have 2 or more falls or 1 fall with injury in the past year?:  No     Frequent urination at night?:  No  Do you use rails/bars?:  No  Do you have a non-slip tub mat?:  No     Are you experiencing loss of meaning?:  No  Are you experiencing loss of hope and peace?:  No     Thinking about your patient's physical health needs, are there any symptoms or problems (risk indicators) you are unsure about that require further investigation?:  Moderate to severe symptoms or problems that impact on daily life   Suggested Interventions and [employer] [facility]:  Not Started     Other Services:  Completed                  Prior to Admission medications

## 2019-09-23 ENCOUNTER — CARE COORDINATION (OUTPATIENT)
Dept: OTHER | Facility: CLINIC | Age: 49
End: 2019-09-23

## 2019-09-23 ENCOUNTER — TELEPHONE (OUTPATIENT)
Dept: ORTHOPEDIC SURGERY | Age: 49
End: 2019-09-23

## 2019-09-23 NOTE — CARE COORDINATION
Ambulatory Care Coordination Note  9/23/2019  CM Risk Score: 8  Charlson 10 Year Mortality Risk Score: 2%     ACC: Ave Mancia LPN    Summary Note: Spoke to patient today due to ACM being off. Patient stated she is having a great deal of pain. Patient stated the last 3 days have been very difficult. Asked patient if she was taking any of her medication as prescribed. Patient stated she wasn't because she didn't feel it was working. When I asked patient if she was taking it as often as she could she admitted she was not. Educated patient on the importance of taking the medication as ordered to alleviate pain. Also discussed using ice and heat to help in this process. Patient asked about ACM checking on the walker. Instructed patient to try medications again and to take them once we were off the phone, continue them through tomorrow and go to PT appointment. Discussed with patient that going to the ED would only be a temporary fix for her pain. She needs to complete the treatment regimen her providers have for her at this time. Informed patient either myself of AC would call and check in with her tomorrow after her PT. Patient voiced understanding and stated she          Care Coordination Interventions    Program Enrollment:  Complex Care  Referral from Primary Care Provider:  No  Suggested Interventions and Community Resources  Behavorial Health:  Not Started  Other Services:  Completed (Comment: [facility] 9/13/19)         Goals Addressed    None         Prior to Admission medications    Medication Sig Start Date End Date Taking?  Authorizing Provider   baclofen (LIORESAL) 10 MG tablet Take 1 tablet by mouth 2 times daily Indications: not taking; does not feel it works  8/7/19   Historical Provider, MD   naproxen (NAPROSYN) 500 MG tablet TAKE 1 TABLET BY MOUTH 2 TIMES DAILY AS NEEDED FOR PAIN TAKE MEDICATION WITH FOOD 8/15/19   Sahra Rosales MD   aspirin-acetaminophen-caffeine [facility] MIGRAINE) 250-250-65 MG per tablet Take 1 tablet by mouth every 6 hours as needed for Headaches 6/28/19   Torsten Elias, APRN - CNP   Multiple Vitamins-Minerals (MULTIVITAMIN ADULT PO) Take 1 tablet by mouth daily    Historical Provider, MD       Future Appointments   Date Time Provider Jay Majano   9/24/2019  7:30 AM Sunday Karley, PT [facility] [place] [facility]   9/25/2019  8:15 AM MASOUD Edward MD [facility]   10/1/2019  8:00 AM Sunday Karley PT [facility]   10/3/2019 10:45 AM Anna Jean MD [address] [facility]

## 2019-09-24 ENCOUNTER — CARE COORDINATION (OUTPATIENT)
Dept: OTHER | Facility: CLINIC | Age: 49
End: 2019-09-24

## 2019-09-24 ENCOUNTER — HOSPITAL ENCOUNTER (OUTPATIENT)
Dept: PHYSICAL THERAPY | Age: 49
Setting detail: THERAPIES SERIES
Discharge: HOME | End: 2019-09-24
Payer: COMMERCIAL

## 2019-09-24 NOTE — TELEPHONE ENCOUNTER
Noted, thank you. I also have an outreach to Ortho. I will continue to follow up with them, thank you.

## 2019-09-24 NOTE — TELEPHONE ENCOUNTER
I last saw her in July. If she needs a rolling walker she needs to see me .   I need to document what is going on

## 2019-09-25 ENCOUNTER — OFFICE VISIT (OUTPATIENT)
Dept: ORTHOPEDIC SURGERY | Age: 49
End: 2019-09-25
Payer: COMMERCIAL

## 2019-09-25 ENCOUNTER — TELEPHONE (OUTPATIENT)
Dept: ORTHOPEDIC SURGERY | Age: 49
End: 2019-09-25

## 2019-09-25 VITALS — HEIGHT: 67 IN | WEIGHT: 227.07 LBS | BODY MASS INDEX: 35.64 KG/M2

## 2019-09-25 DIAGNOSIS — M47.816 LOCALIZED OSTEOARTHRITIS OF LUMBAR SPINE: ICD-10-CM

## 2019-09-25 DIAGNOSIS — M54.5 LOW BACK PAIN, UNSPECIFIED BACK PAIN LATERALITY, UNSPECIFIED CHRONICITY, WITH SCIATICA PRESENCE UNSPECIFIED: ICD-10-CM

## 2019-09-25 DIAGNOSIS — M51.27 HERNIATED NUCLEUS PULPOSUS, L5-S1: Primary | ICD-10-CM

## 2019-09-25 PROCEDURE — 1036F TOBACCO NON-USER: CPT | Performed by: PHYSICAL MEDICINE & REHABILITATION

## 2019-09-25 PROCEDURE — 96372 THER/PROPH/DIAG INJ SC/IM: CPT | Performed by: PHYSICAL MEDICINE & REHABILITATION

## 2019-09-25 PROCEDURE — G8427 DOCREV CUR MEDS BY ELIG CLIN: HCPCS | Performed by: PHYSICAL MEDICINE & REHABILITATION

## 2019-09-25 PROCEDURE — 99214 OFFICE O/P EST MOD 30 MIN: CPT | Performed by: PHYSICAL MEDICINE & REHABILITATION

## 2019-09-25 PROCEDURE — G8417 CALC BMI ABV UP PARAM F/U: HCPCS | Performed by: PHYSICAL MEDICINE & REHABILITATION

## 2019-09-25 RX ORDER — TIZANIDINE 4 MG/1
4 TABLET ORAL NIGHTLY PRN
Qty: 30 TABLET | Refills: 0 | Status: SHIPPED | OUTPATIENT
Start: 2019-09-25 | End: 2020-03-02

## 2019-09-25 RX ORDER — PREDNISONE 10 MG/1
TABLET ORAL
Qty: 26 TABLET | Refills: 0 | Status: SHIPPED | OUTPATIENT
Start: 2019-09-25 | End: 2020-03-02

## 2019-09-25 RX ORDER — KETOROLAC TROMETHAMINE 30 MG/ML
60 INJECTION, SOLUTION INTRAMUSCULAR; INTRAVENOUS ONCE
Status: COMPLETED | OUTPATIENT
Start: 2019-09-25 | End: 2019-09-25

## 2019-09-25 RX ADMIN — KETOROLAC TROMETHAMINE 60 MG: 30 INJECTION, SOLUTION INTRAMUSCULAR; INTRAVENOUS at 09:30

## 2019-09-25 NOTE — TELEPHONE ENCOUNTER
Patient is in need of a Rolling walker. States her pain in lower back is getting worse when walks. She is unable to stand long periods. Please send order to [address] [facility]   Fax #[phone]    Please call patient when completed.

## 2019-09-27 ENCOUNTER — TELEPHONE (OUTPATIENT)
Dept: ORTHOPEDIC SURGERY | Age: 49
End: 2019-09-27

## 2019-10-03 ENCOUNTER — OFFICE VISIT (OUTPATIENT)
Dept: ORTHOPEDIC SURGERY | Age: 49
End: 2019-10-03
Payer: COMMERCIAL

## 2019-10-03 VITALS
HEART RATE: 68 BPM | BODY MASS INDEX: 35.64 KG/M2 | HEIGHT: 67 IN | DIASTOLIC BLOOD PRESSURE: 63 MMHG | WEIGHT: 227.07 LBS | SYSTOLIC BLOOD PRESSURE: 100 MMHG

## 2019-10-03 DIAGNOSIS — M47.816 LOCALIZED OSTEOARTHRITIS OF LUMBAR SPINE: ICD-10-CM

## 2019-10-03 DIAGNOSIS — M51.27 HERNIATED NUCLEUS PULPOSUS, L5-S1: Primary | ICD-10-CM

## 2019-10-03 PROCEDURE — G8417 CALC BMI ABV UP PARAM F/U: HCPCS | Performed by: ORTHOPAEDIC SURGERY

## 2019-10-03 PROCEDURE — 1036F TOBACCO NON-USER: CPT | Performed by: ORTHOPAEDIC SURGERY

## 2019-10-03 PROCEDURE — G8427 DOCREV CUR MEDS BY ELIG CLIN: HCPCS | Performed by: ORTHOPAEDIC SURGERY

## 2019-10-03 PROCEDURE — 99214 OFFICE O/P EST MOD 30 MIN: CPT | Performed by: ORTHOPAEDIC SURGERY

## 2019-10-03 PROCEDURE — G8484 FLU IMMUNIZE NO ADMIN: HCPCS | Performed by: ORTHOPAEDIC SURGERY

## 2019-10-03 RX ORDER — KETOROLAC TROMETHAMINE 10 MG/1
10 TABLET, FILM COATED ORAL EVERY 6 HOURS PRN
Qty: 20 TABLET | Refills: 0 | Status: SHIPPED | OUTPATIENT
Start: 2019-10-03 | End: 2020-03-02

## 2019-10-08 ENCOUNTER — HOSPITAL ENCOUNTER (OUTPATIENT)
Dept: PHYSICAL THERAPY | Age: 49
Setting detail: THERAPIES SERIES
Discharge: HOME | End: 2019-10-08
Payer: COMMERCIAL

## 2019-10-08 PROCEDURE — 97161 PT EVAL LOW COMPLEX 20 MIN: CPT

## 2019-10-08 ASSESSMENT — PAIN DESCRIPTION - ORIENTATION: ORIENTATION: LOWER

## 2019-10-08 ASSESSMENT — PAIN DESCRIPTION - LOCATION: LOCATION: BACK

## 2019-10-08 ASSESSMENT — PAIN DESCRIPTION - PAIN TYPE: TYPE: CHRONIC PAIN

## 2019-10-08 ASSESSMENT — PAIN SCALES - GENERAL: PAINLEVEL_OUTOF10: 5

## 2019-10-09 ENCOUNTER — TELEPHONE (OUTPATIENT)
Dept: ORTHOPEDIC SURGERY | Age: 49
End: 2019-10-09

## 2019-10-10 ENCOUNTER — HOSPITAL ENCOUNTER (OUTPATIENT)
Dept: PHYSICAL THERAPY | Age: 49
Setting detail: THERAPIES SERIES
Discharge: HOME | End: 2019-10-10
Payer: COMMERCIAL

## 2019-10-10 ENCOUNTER — CARE COORDINATION (OUTPATIENT)
Dept: OTHER | Facility: CLINIC | Age: 49
End: 2019-10-10

## 2019-10-10 PROCEDURE — 97150 GROUP THERAPEUTIC PROCEDURES: CPT

## 2019-10-10 PROCEDURE — 97113 AQUATIC THERAPY/EXERCISES: CPT

## 2019-10-15 ENCOUNTER — HOSPITAL ENCOUNTER (OUTPATIENT)
Dept: PHYSICAL THERAPY | Age: 49
Setting detail: THERAPIES SERIES
Discharge: HOME | End: 2019-10-15
Payer: COMMERCIAL

## 2019-10-15 ENCOUNTER — TELEPHONE (OUTPATIENT)
Dept: ORTHOPEDIC SURGERY | Age: 49
End: 2019-10-15

## 2019-10-15 PROCEDURE — 97113 AQUATIC THERAPY/EXERCISES: CPT

## 2019-10-15 PROCEDURE — 97150 GROUP THERAPEUTIC PROCEDURES: CPT

## 2019-10-17 ENCOUNTER — HOSPITAL ENCOUNTER (OUTPATIENT)
Dept: PHYSICAL THERAPY | Age: 49
Setting detail: THERAPIES SERIES
Discharge: HOME | End: 2019-10-17
Payer: COMMERCIAL

## 2019-10-17 DIAGNOSIS — M47.816 LOCALIZED OSTEOARTHRITIS OF LUMBAR SPINE: Primary | ICD-10-CM

## 2019-10-17 PROCEDURE — 97113 AQUATIC THERAPY/EXERCISES: CPT

## 2019-10-17 PROCEDURE — 97150 GROUP THERAPEUTIC PROCEDURES: CPT

## 2019-10-17 RX ORDER — TRAMADOL HYDROCHLORIDE 50 MG/1
50 TABLET ORAL EVERY 4 HOURS PRN
Qty: 42 TABLET | Refills: 0 | Status: SHIPPED | OUTPATIENT
Start: 2019-10-17 | End: 2019-11-06 | Stop reason: SDUPTHER

## 2019-10-18 ENCOUNTER — TELEPHONE (OUTPATIENT)
Dept: ORTHOPEDIC SURGERY | Age: 49
End: 2019-10-18

## 2019-10-22 ENCOUNTER — HOSPITAL ENCOUNTER (OUTPATIENT)
Dept: PHYSICAL THERAPY | Age: 49
Setting detail: THERAPIES SERIES
Discharge: HOME | End: 2019-10-22
Payer: COMMERCIAL

## 2019-10-22 PROCEDURE — 97150 GROUP THERAPEUTIC PROCEDURES: CPT

## 2019-10-22 PROCEDURE — 97113 AQUATIC THERAPY/EXERCISES: CPT

## 2019-10-24 ENCOUNTER — HOSPITAL ENCOUNTER (OUTPATIENT)
Dept: PHYSICAL THERAPY | Age: 49
Setting detail: THERAPIES SERIES
Discharge: HOME | End: 2019-10-24
Payer: COMMERCIAL

## 2019-10-25 ENCOUNTER — CARE COORDINATION (OUTPATIENT)
Dept: OTHER | Facility: CLINIC | Age: 49
End: 2019-10-25

## 2019-10-29 ENCOUNTER — HOSPITAL ENCOUNTER (OUTPATIENT)
Dept: PHYSICAL THERAPY | Age: 49
Setting detail: THERAPIES SERIES
Discharge: HOME | End: 2019-10-29
Payer: COMMERCIAL

## 2019-10-29 PROCEDURE — 97113 AQUATIC THERAPY/EXERCISES: CPT

## 2019-10-29 PROCEDURE — 97150 GROUP THERAPEUTIC PROCEDURES: CPT

## 2019-11-05 ENCOUNTER — HOSPITAL ENCOUNTER (OUTPATIENT)
Dept: PHYSICAL THERAPY | Age: 49
Setting detail: THERAPIES SERIES
Discharge: HOME | End: 2019-11-05
Payer: COMMERCIAL

## 2019-11-05 PROCEDURE — 97164 PT RE-EVAL EST PLAN CARE: CPT

## 2019-11-05 PROCEDURE — 97150 GROUP THERAPEUTIC PROCEDURES: CPT

## 2019-11-05 PROCEDURE — 97113 AQUATIC THERAPY/EXERCISES: CPT

## 2019-11-06 ENCOUNTER — OFFICE VISIT (OUTPATIENT)
Dept: ORTHOPEDIC SURGERY | Age: 49
End: 2019-11-06
Payer: COMMERCIAL

## 2019-11-06 VITALS
DIASTOLIC BLOOD PRESSURE: 59 MMHG | WEIGHT: 227.07 LBS | HEIGHT: 67 IN | HEART RATE: 82 BPM | BODY MASS INDEX: 35.64 KG/M2 | SYSTOLIC BLOOD PRESSURE: 107 MMHG

## 2019-11-06 DIAGNOSIS — M51.27 HERNIATED NUCLEUS PULPOSUS, L5-S1: ICD-10-CM

## 2019-11-06 DIAGNOSIS — M47.816 LOCALIZED OSTEOARTHRITIS OF LUMBAR SPINE: Primary | ICD-10-CM

## 2019-11-06 PROCEDURE — G8417 CALC BMI ABV UP PARAM F/U: HCPCS | Performed by: ORTHOPAEDIC SURGERY

## 2019-11-06 PROCEDURE — G8484 FLU IMMUNIZE NO ADMIN: HCPCS | Performed by: ORTHOPAEDIC SURGERY

## 2019-11-06 PROCEDURE — G8427 DOCREV CUR MEDS BY ELIG CLIN: HCPCS | Performed by: ORTHOPAEDIC SURGERY

## 2019-11-06 PROCEDURE — 1036F TOBACCO NON-USER: CPT | Performed by: ORTHOPAEDIC SURGERY

## 2019-11-06 PROCEDURE — 99214 OFFICE O/P EST MOD 30 MIN: CPT | Performed by: ORTHOPAEDIC SURGERY

## 2019-11-06 RX ORDER — LIDOCAINE 50 MG/G
1 PATCH TOPICAL EVERY 12 HOURS
Qty: 30 PATCH | Refills: 0 | Status: SHIPPED | OUTPATIENT
Start: 2019-11-06 | End: 2020-03-02

## 2019-11-06 RX ORDER — TRAMADOL HYDROCHLORIDE 50 MG/1
50 TABLET ORAL EVERY 4 HOURS PRN
Qty: 42 TABLET | Refills: 0 | Status: SHIPPED | OUTPATIENT
Start: 2019-11-06 | End: 2020-04-16 | Stop reason: SDUPTHER

## 2019-11-07 ENCOUNTER — HOSPITAL ENCOUNTER (OUTPATIENT)
Dept: PHYSICAL THERAPY | Age: 49
Setting detail: THERAPIES SERIES
Discharge: HOME | End: 2019-11-07
Payer: COMMERCIAL

## 2019-11-07 ENCOUNTER — TELEPHONE (OUTPATIENT)
Dept: ORTHOPEDIC SURGERY | Age: 49
End: 2019-11-07

## 2019-11-07 PROCEDURE — 97113 AQUATIC THERAPY/EXERCISES: CPT

## 2019-11-07 PROCEDURE — 97150 GROUP THERAPEUTIC PROCEDURES: CPT

## 2019-11-11 ENCOUNTER — TELEPHONE (OUTPATIENT)
Dept: ORTHOPEDIC SURGERY | Age: 49
End: 2019-11-11

## 2019-11-14 ENCOUNTER — HOSPITAL ENCOUNTER (OUTPATIENT)
Dept: PHYSICAL THERAPY | Age: 49
Setting detail: THERAPIES SERIES
Discharge: HOME | End: 2019-11-14
Payer: COMMERCIAL

## 2019-11-14 PROCEDURE — 97150 GROUP THERAPEUTIC PROCEDURES: CPT

## 2019-11-14 PROCEDURE — 97113 AQUATIC THERAPY/EXERCISES: CPT

## 2019-11-15 ENCOUNTER — CARE COORDINATION (OUTPATIENT)
Dept: OTHER | Facility: CLINIC | Age: 49
End: 2019-11-15

## 2019-11-19 ENCOUNTER — HOSPITAL ENCOUNTER (OUTPATIENT)
Dept: PHYSICAL THERAPY | Age: 49
Setting detail: THERAPIES SERIES
Discharge: HOME | End: 2019-11-19
Payer: COMMERCIAL

## 2019-11-19 PROCEDURE — 97150 GROUP THERAPEUTIC PROCEDURES: CPT

## 2019-11-19 PROCEDURE — 97113 AQUATIC THERAPY/EXERCISES: CPT

## 2019-11-22 ENCOUNTER — TELEPHONE (OUTPATIENT)
Dept: ORTHOPEDIC SURGERY | Age: 49
End: 2019-11-22

## 2019-11-26 ENCOUNTER — HOSPITAL ENCOUNTER (OUTPATIENT)
Dept: PHYSICAL THERAPY | Age: 49
Setting detail: THERAPIES SERIES
Discharge: HOME | End: 2019-11-26
Payer: COMMERCIAL

## 2019-11-26 PROCEDURE — 97113 AQUATIC THERAPY/EXERCISES: CPT

## 2019-11-26 PROCEDURE — 97150 GROUP THERAPEUTIC PROCEDURES: CPT

## 2019-12-03 ENCOUNTER — CARE COORDINATION (OUTPATIENT)
Dept: OTHER | Facility: CLINIC | Age: 49
End: 2019-12-03

## 2019-12-10 ENCOUNTER — CARE COORDINATION (OUTPATIENT)
Dept: OTHER | Facility: CLINIC | Age: 49
End: 2019-12-10

## 2019-12-19 ENCOUNTER — CARE COORDINATION (OUTPATIENT)
Dept: OTHER | Facility: CLINIC | Age: 49
End: 2019-12-19

## 2019-12-25 ENCOUNTER — APPOINTMENT (OUTPATIENT)
Dept: CT IMAGING | Age: 49
End: 2019-12-25
Payer: COMMERCIAL

## 2019-12-25 ENCOUNTER — HOSPITAL ENCOUNTER (EMERGENCY)
Age: 49
Discharge: HOME | End: 2019-12-25
Payer: COMMERCIAL

## 2019-12-25 VITALS
OXYGEN SATURATION: 96 % | BODY MASS INDEX: 34.57 KG/M2 | WEIGHT: 220.24 LBS | DIASTOLIC BLOOD PRESSURE: 76 MMHG | HEIGHT: 67 IN | TEMPERATURE: 97.3 F | RESPIRATION RATE: 18 BRPM | HEART RATE: 75 BPM | SYSTOLIC BLOOD PRESSURE: 123 MMHG

## 2019-12-25 DIAGNOSIS — K92.2 GASTROINTESTINAL HEMORRHAGE, UNSPECIFIED GASTROINTESTINAL HEMORRHAGE TYPE: Primary | ICD-10-CM

## 2019-12-25 LAB
A/G RATIO: 1.1 (ref 1.1–2.2)
ALBUMIN SERPL-MCNC: 3.9 G/DL (ref 3.4–5)
ALP BLD-CCNC: 92 U/L (ref 40–129)
ALT SERPL-CCNC: 15 U/L (ref 10–40)
ANION GAP SERPL CALCULATED.3IONS-SCNC: 14 MMOL/L (ref 3–16)
AST SERPL-CCNC: 15 U/L (ref 15–37)
BASOPHILS ABSOLUTE: 0 K/UL (ref 0–0.2)
BASOPHILS RELATIVE PERCENT: 0.5 %
BILIRUB SERPL-MCNC: 0.8 MG/DL (ref 0–1)
BILIRUBIN URINE: NEGATIVE
BLOOD, URINE: NEGATIVE
BUN BLDV-MCNC: 26 MG/DL (ref 7–20)
CALCIUM SERPL-MCNC: 9.6 MG/DL (ref 8.3–10.6)
CHLORIDE BLD-SCNC: 104 MMOL/L (ref 99–110)
CLARITY: CLEAR
CO2: 23 MMOL/L (ref 21–32)
COLOR: YELLOW
CREAT SERPL-MCNC: 0.7 MG/DL (ref 0.6–1.1)
EOSINOPHILS ABSOLUTE: 0.3 K/UL (ref 0–0.6)
EOSINOPHILS RELATIVE PERCENT: 3.9 %
GFR AFRICAN AMERICAN: >60
GFR NON-AFRICAN AMERICAN: >60
GLOBULIN: 3.4 G/DL
GLUCOSE BLD-MCNC: 95 MG/DL (ref 70–99)
GLUCOSE URINE: NEGATIVE MG/DL
HCT VFR BLD CALC: 39.8 % (ref 36–48)
HEMOGLOBIN: 13.3 G/DL (ref 12–16)
KETONES, URINE: NEGATIVE MG/DL
LEUKOCYTE ESTERASE, URINE: NEGATIVE
LIPASE: 29 U/L (ref 13–60)
LYMPHOCYTES ABSOLUTE: 1.3 K/UL (ref 1–5.1)
LYMPHOCYTES RELATIVE PERCENT: 17.8 %
MCH RBC QN AUTO: 28.9 PG (ref 26–34)
MCHC RBC AUTO-ENTMCNC: 33.3 G/DL (ref 31–36)
MCV RBC AUTO: 86.5 FL (ref 80–100)
MICROSCOPIC EXAMINATION: NORMAL
MONOCYTES ABSOLUTE: 0.5 K/UL (ref 0–1.3)
MONOCYTES RELATIVE PERCENT: 7.1 %
NEUTROPHILS ABSOLUTE: 5.2 K/UL (ref 1.7–7.7)
NEUTROPHILS RELATIVE PERCENT: 70.7 %
NITRITE, URINE: NEGATIVE
OCCULT BLOOD DIAGNOSTIC: ABNORMAL
PDW BLD-RTO: 15.2 % (ref 12.4–15.4)
PH UA: 6 (ref 5–8)
PLATELET # BLD: 183 K/UL (ref 135–450)
PMV BLD AUTO: 9.7 FL (ref 5–10.5)
POTASSIUM REFLEX MAGNESIUM: 4.1 MMOL/L (ref 3.5–5.1)
PROTEIN UA: NEGATIVE MG/DL
RBC # BLD: 4.6 M/UL (ref 4–5.2)
SODIUM BLD-SCNC: 141 MMOL/L (ref 136–145)
SPECIFIC GRAVITY UA: 1.03 (ref 1–1.03)
TOTAL PROTEIN: 7.3 G/DL (ref 6.4–8.2)
URINE REFLEX TO CULTURE: NORMAL
URINE TYPE: NORMAL
UROBILINOGEN, URINE: 0.2 E.U./DL
WBC # BLD: 7.3 K/UL (ref 4–11)

## 2019-12-25 PROCEDURE — 83690 ASSAY OF LIPASE: CPT

## 2019-12-25 PROCEDURE — 99284 EMERGENCY DEPT VISIT MOD MDM: CPT

## 2019-12-25 PROCEDURE — 80053 COMPREHEN METABOLIC PANEL: CPT

## 2019-12-25 PROCEDURE — 81003 URINALYSIS AUTO W/O SCOPE: CPT

## 2019-12-25 PROCEDURE — 85025 COMPLETE CBC W/AUTO DIFF WBC: CPT

## 2019-12-25 PROCEDURE — G0328 FECAL BLOOD SCRN IMMUNOASSAY: HCPCS

## 2019-12-25 PROCEDURE — 74176 CT ABD & PELVIS W/O CONTRAST: CPT

## 2019-12-25 ASSESSMENT — ENCOUNTER SYMPTOMS
EYE DISCHARGE: 0
BACK PAIN: 0
EYE REDNESS: 0
SORE THROAT: 0
NAUSEA: 0
SHORTNESS OF BREATH: 0
FACIAL SWELLING: 0
APNEA: 0
CHOKING: 0
ABDOMINAL PAIN: 0
VOMITING: 0
ANAL BLEEDING: 1

## 2019-12-25 ASSESSMENT — PAIN DESCRIPTION - DESCRIPTORS: DESCRIPTORS: CRAMPING

## 2019-12-25 ASSESSMENT — PAIN DESCRIPTION - ONSET: ONSET: ON-GOING

## 2019-12-25 ASSESSMENT — PAIN DESCRIPTION - PROGRESSION
CLINICAL_PROGRESSION: RAPIDLY IMPROVING
CLINICAL_PROGRESSION: RESOLVED
CLINICAL_PROGRESSION: GRADUALLY IMPROVING

## 2019-12-25 ASSESSMENT — PAIN DESCRIPTION - FREQUENCY: FREQUENCY: INTERMITTENT

## 2019-12-25 ASSESSMENT — PAIN DESCRIPTION - LOCATION: LOCATION: ABDOMEN

## 2019-12-25 ASSESSMENT — PAIN SCALES - GENERAL: PAINLEVEL_OUTOF10: 5

## 2019-12-25 ASSESSMENT — PAIN - FUNCTIONAL ASSESSMENT: PAIN_FUNCTIONAL_ASSESSMENT: ACTIVITIES ARE NOT PREVENTED

## 2019-12-25 ASSESSMENT — PAIN DESCRIPTION - PAIN TYPE: TYPE: ACUTE PAIN

## 2019-12-31 ENCOUNTER — CARE COORDINATION (OUTPATIENT)
Dept: OTHER | Facility: CLINIC | Age: 49
End: 2019-12-31

## 2019-12-31 NOTE — CARE COORDINATION
[facility] sent the following SmashChart message:    Dear Annette Montano! I hope this message finds you well. I will be off from 1-1-20 to 1-3-20, returning to the office on 1-6-20. My MyChart in basket will be covered by Annetteaubrey Monahan at that time. Should you have non-urgent needs that can wait 24 hours for a response you may send a [employer] message to me, Emilio Leon, and the covering AC will respond. For more urgent needs please contact your PCP office or the Nurse Access Line at [phone]. Please feel free to send updates via SmashChart on your health goals, progress, needs or questions anytime! Sincerely,     Emilio Leon RN, BSN   Associate Care Manager   [phone]  [email or web address]

## 2020-03-02 ENCOUNTER — OFFICE VISIT (OUTPATIENT)
Dept: FAMILY MEDICINE CLINIC | Age: 50
End: 2020-03-02
Payer: MEDICAID

## 2020-03-02 VITALS
BODY MASS INDEX: 34.5 KG/M2 | HEART RATE: 77 BPM | DIASTOLIC BLOOD PRESSURE: 68 MMHG | OXYGEN SATURATION: 95 % | WEIGHT: 219.8 LBS | RESPIRATION RATE: 14 BRPM | HEIGHT: 67 IN | SYSTOLIC BLOOD PRESSURE: 112 MMHG

## 2020-03-02 LAB
INFLUENZA A ANTIBODY: NORMAL
INFLUENZA B ANTIBODY: NORMAL
S PYO AG THROAT QL: NORMAL

## 2020-03-02 PROCEDURE — 87804 INFLUENZA ASSAY W/OPTIC: CPT | Performed by: INTERNAL MEDICINE

## 2020-03-02 PROCEDURE — G8417 CALC BMI ABV UP PARAM F/U: HCPCS | Performed by: INTERNAL MEDICINE

## 2020-03-02 PROCEDURE — G8427 DOCREV CUR MEDS BY ELIG CLIN: HCPCS | Performed by: INTERNAL MEDICINE

## 2020-03-02 PROCEDURE — G8484 FLU IMMUNIZE NO ADMIN: HCPCS | Performed by: INTERNAL MEDICINE

## 2020-03-02 PROCEDURE — 87880 STREP A ASSAY W/OPTIC: CPT | Performed by: INTERNAL MEDICINE

## 2020-03-02 PROCEDURE — 99213 OFFICE O/P EST LOW 20 MIN: CPT | Performed by: INTERNAL MEDICINE

## 2020-03-02 PROCEDURE — 1036F TOBACCO NON-USER: CPT | Performed by: INTERNAL MEDICINE

## 2020-03-02 RX ORDER — GUAIFENESIN 600 MG/1
600 TABLET, EXTENDED RELEASE ORAL 2 TIMES DAILY PRN
Qty: 30 TABLET | Refills: 0 | Status: SHIPPED | OUTPATIENT
Start: 2020-03-02 | End: 2020-03-17

## 2020-03-02 ASSESSMENT — PATIENT HEALTH QUESTIONNAIRE - PHQ9
SUM OF ALL RESPONSES TO PHQ9 QUESTIONS 1 & 2: 0
SUM OF ALL RESPONSES TO PHQ QUESTIONS 1-9: 0
2. FEELING DOWN, DEPRESSED OR HOPELESS: 0
1. LITTLE INTEREST OR PLEASURE IN DOING THINGS: 0
SUM OF ALL RESPONSES TO PHQ QUESTIONS 1-9: 0

## 2020-03-02 ASSESSMENT — ENCOUNTER SYMPTOMS
WHEEZING: 0
COUGH: 1
VOMITING: 0
SORE THROAT: 1
NAUSEA: 0
SHORTNESS OF BREATH: 0

## 2020-03-02 NOTE — PROGRESS NOTES
3/2/2020    Chief Complaint   Patient presents with    Cough     cough, joaquin and scratcy throat.  Congestion     chest joaquin and coughing up small amounts of mucous. Cough   This is a new problem. The current episode started in the past 7 days. The problem has been unchanged. The problem occurs every few hours. The cough is productive of sputum. Associated symptoms include ear pain and a sore throat. Pertinent negatives include no chills, fever, shortness of breath or wheezing. Nothing aggravates the symptoms. Treatments tried: ibuprofen pr. There is no history of asthma or COPD. Sore throat scratchy  , felt firey just last night. Cough  Coughing  X  2 days . Sister is ill. Had a little bit of mucus  3-4 times small amount. Sneezing, scratchy throat. Congested. Moved back to this area of town. Living at [facility] . Left hospital completely. Wants to start a new job at end of the week. Review of Systems   Constitutional: Negative for chills and fever. HENT: Positive for ear pain and sore throat. Respiratory: Positive for cough. Negative for shortness of breath and wheezing. Gastrointestinal: Negative for nausea and vomiting.         More freq bm       Health Maintenance   Topic Date Due    HIV screen  11/09/1986    Cervical cancer screen  09/12/2017    Flu vaccine (1) 09/01/2019    Shingles Vaccine (1 of 2) 11/09/2021    Lipid screen  04/27/2024    DTaP/Tdap/Td vaccine (3 - Td) 12/07/2028    Hepatitis A vaccine  Aged Out    Hepatitis B vaccine  Aged Out    Hib vaccine  Aged Out    Meningococcal (ACWY) vaccine  Aged Out    Pneumococcal 0-64 years Vaccine  Aged Out      Social History     Socioeconomic History    Marital status: Single     Spouse name: None    Number of children: None    Years of education: None    Highest education level: None   Occupational History    Occupation: Housekeeping      Employer: [employer]: on [address] [employer] Financial resource strain: Not very hard    Food insecurity:     Worry: Sometimes true     Inability: Sometimes true   Occasion needs:     Medical: Yes     Non-medical: Yes   Tobacco Use    Smoking status: Former Smoker     Years: 1.00     Types: Cigarettes     Last attempt to quit: 1987     Years since quittin.3    Smokeless tobacco: Never Used    Tobacco comment: counseled on tobacco exposure avoidance   Substance and Sexual Activity    Alcohol use: No     Alcohol/week: 0.0 standard drinks     Frequency: Never     Binge frequency: Never    Drug use: No    Sexual activity: Never   Lifestyle    Physical activity:     Days per week: 0 days     Minutes per session: 0 min    Stress: Rather much   Relationships    Social connections:     Talks on phone: None     Gets together: None     Attends Mandaeism service: None     Active member of club or organization: None     Attends meetings of clubs or organizations: None     Relationship status: None    Intimate partner violence:     Fear of current or ex partner: None     Emotionally abused: None     Physically abused: None     Forced sexual activity: None   Other Topics Concern    None   Social History Narrative    19: Patient currently using two [employer] food panteries and [employer] pantry as needed for food shortages. She has also used financial assistance resources at her [employer], she is not eligable again for 6 months. [facility] will send via e-mail per patients request list of other local organizations that may offer food and financial assistance. Patient has also reached out to life matters and she attended one counseling session but has not rescheduled. ACM encouraged patient to continue counseling services with [facility]; [facility] sent [facility] Brochure and contact number; encouraged her to reach out for other resources as well.          Family History   Problem Relation Age of Onset    Heart Disease Father    [facility] Other Father LILIAN    Diabetes Father     Alcohol Abuse Father     Obesity Father     Alzheimer's Disease Other         Grandparents    Breast Cancer Other         Aunt    Lung Cancer Other         Grandma    Colon Cancer Other         Grandpa    Diabetes Other         Grandma    Seizures Sister         Gilberto Vazquez, son     Prior to Visit Medications    Medication Sig Taking?  Authorizing Provider   Multiple Vitamins-Minerals (MULTIVITAMIN ADULT PO) Take 1 tablet by mouth daily Yes Historical Provider, MD     Patient Active Problem List   Diagnosis    Left knee pain    Low back pain--prn pain meds (nsaids)    Herniated nucleus pulposus, L5-S1    Anxiety--on ssri and seroquel with help--does not see psychiatrist currently    GERD (gastroesophageal reflux disease)--off ppi currently--s/p egd 6/16--wnl    Obstructive apnea    Acute DVT of right tibial vein ([address]- started xarelto 3/9/16==saw dr moya-(hematol)-tx x 6 mo--feels provoked by dvt--rest of w/u pending post off meds    Colon polyp--on colo 6/16--dr wilcox    Pelvic pain in female    Adnexal mass    Iron deficiency anemia    Chest pain    Hypoglycemia    Thyroid nodule    Tinea pedis    Non morbid obesity, unspecified obesity type    Localized osteoarthritis of lumbar spine        LABS:   Lab Results   Component Value Date    GLUCOSE 95 12/25/2019     Lab Results   Component Value Date     12/25/2019    K 4.1 12/25/2019    CREATININE 0.7 12/25/2019     Cholesterol, Total   Date Value Ref Range Status   04/27/2019 157 0 - 199 mg/dL Final     LDL Calculated   Date Value Ref Range Status   04/27/2019 95 <100 mg/dL Final     HDL   Date Value Ref Range Status   04/27/2019 52 40 - 60 mg/dL Final     Triglycerides   Date Value Ref Range Status   04/27/2019 49 0 - 150 mg/dL Final     Lab Results   Component Value Date    ALT 15 12/25/2019    AST 15 12/25/2019    ALKPHOS 92 12/25/2019    BILITOT 0.8 12/25/2019      Lab Results   Component Value Date WBC 7.3 12/25/2019    HGB 13.3 12/25/2019    HCT 39.8 12/25/2019    MCV 86.5 12/25/2019     12/25/2019     TSH (uIU/mL)   Date Value   08/05/2015 0.94     Lab Results   Component Value Date    LABA1C 5.3 08/05/2015     No results found for: PSA, PSADIA     PHYSICAL EXAM:  /68   Pulse 77   Resp 14   Ht 5' 7\" (1.702 m)   Wt 219 lb 12.8 oz (99.7 kg)   LMP 11/02/2016   SpO2 95%   BMI 34.43 kg/m²    Physical Exam  Constitutional:       Appearance: Normal appearance. She is well-developed. HENT:      Head: Normocephalic and atraumatic. Right Ear: Tympanic membrane and ear canal normal.      Left Ear: Tympanic membrane and ear canal normal.      Mouth/Throat:      Mouth: Mucous membranes are moist.      Pharynx: Posterior oropharyngeal erythema (pharynx red) present. Cardiovascular:      Rate and Rhythm: Normal rate and regular rhythm. Heart sounds: No murmur. Pulmonary:      Effort: Pulmonary effort is normal.      Breath sounds: Normal breath sounds. No wheezing. Skin:     General: Skin is warm and dry. Neurological:      Mental Status: She is alert. Psychiatric:         Mood and Affect: Mood normal.         Behavior: Behavior normal.         Thought Content: Thought content normal.         Judgment: Judgment normal.       BP Readings from Last 5 Encounters:   03/02/20 112/68   12/25/19 123/76   11/06/19 (!) 107/59   10/03/19 100/63   09/10/19 113/74       Wt Readings from Last 5 Encounters:   03/02/20 219 lb 12.8 oz (99.7 kg)   12/25/19 220 lb 3.8 oz (99.9 kg)   11/06/19 227 lb 1.2 oz (103 kg)   10/03/19 227 lb 1.2 oz (103 kg)   09/25/19 227 lb 1.2 oz (103 kg)        ASSESSMENT/PLAN:    Ny was seen today for cough and congestion. Diagnoses and all orders for this visit:    Sore throat    Flu-like symptoms  -     POCT Influenza A/B    Cough    Other orders  -     guaiFENesin (MUCINEX) 600 MG extended release tablet;  Take 1 tablet by mouth 2 times daily as needed for

## 2020-03-05 ENCOUNTER — OFFICE VISIT (OUTPATIENT)
Dept: ORTHOPEDIC SURGERY | Age: 50
End: 2020-03-05
Payer: MEDICAID

## 2020-03-05 VITALS
DIASTOLIC BLOOD PRESSURE: 58 MMHG | SYSTOLIC BLOOD PRESSURE: 101 MMHG | WEIGHT: 219.8 LBS | BODY MASS INDEX: 34.5 KG/M2 | HEART RATE: 80 BPM | HEIGHT: 67 IN

## 2020-03-05 PROCEDURE — G8417 CALC BMI ABV UP PARAM F/U: HCPCS | Performed by: ORTHOPAEDIC SURGERY

## 2020-03-05 PROCEDURE — 99214 OFFICE O/P EST MOD 30 MIN: CPT | Performed by: ORTHOPAEDIC SURGERY

## 2020-03-05 PROCEDURE — 1036F TOBACCO NON-USER: CPT | Performed by: ORTHOPAEDIC SURGERY

## 2020-03-05 PROCEDURE — G8427 DOCREV CUR MEDS BY ELIG CLIN: HCPCS | Performed by: ORTHOPAEDIC SURGERY

## 2020-03-05 PROCEDURE — G8484 FLU IMMUNIZE NO ADMIN: HCPCS | Performed by: ORTHOPAEDIC SURGERY

## 2020-03-05 NOTE — PROGRESS NOTES
drugs. Family History:   Family History   Problem Relation Age of Onset    Heart Disease Father     Other Father         LILIAN    Diabetes Father     Alcohol Abuse Father     Obesity Father     Alzheimer's Disease Other         Grandparents    Breast Cancer Other         Aunt    Lung Cancer Other         Grandma    Colon Cancer Other         Grandpa    Diabetes Other         Grandma    Seizures Sister         Artur Nance, son       REVIEW OF SYSTEMS: Full ROS noted & scanned   CONSTITUTIONAL: Denies unexplained weight loss, fevers, chills or fatigue  NEUROLOGICAL: Denies unsteady gait or progressive weakness      PHYSICAL EXAM:    Vitals: Blood pressure (!) 101/58, pulse 80, height 5' 7.01\" (1.702 m), weight 219 lb 12.8 oz (99.7 kg), last menstrual period 11/02/2016, not currently breastfeeding. GENERAL EXAM:  · General Apparence: Patient is adequately groomed with no evidence of malnutrition. · Orientation: The patient is oriented to time, place and person. · Mood & Affect:The patient's mood and affect are appropriate   · Lymphatic: The lymphatic examination bilaterally reveals all areas to be without enlargement or induration  · Sensation: Sensation is intact without deficit  · Coordination/Balance: Good coordination     LUMBAR/SACRAL EXAMINATION:  · Inspection: Local inspection shows no step-off or bruising. Lumbar alignment is normal.  Sagittal and Coronal balance is neutral.      · Palpation:   No evidence of tenderness at the midline. Tenderness bilaterally at the paraspinal and trochanters. There is no step-off or paraspinal spasm. · Range of Motion: 40 degrees of flexion, 15 degrees extension  · Strength:   Strength testing is 5/5 in all muscle groups tested although some give way weakness is noted on the left side quad anterior tib and plantarflexion  · Special Tests:   Straight leg raise and crossed SLR negative on the right although mildly positive on the left.   Leg length and pelvis level.  0 out of 5 Minesh's signs. · Skin: There are no rashes, ulcerations or lesions. · Reflexes: Reflexes are symmetrically 2+ at the patellar and ankle tendons. Clonus absent bilaterally at the feet. · Gait & station: Normal gait  · Additional Examinations:   · RIGHT LOWER EXTREMITY: Inspection/examination of the right lower extremity does not show any tenderness, deformity or injury. Range of motion is full. There is no gross instability. There are no rashes, ulcerations or lesions. Strength and tone are normal.  ·   · LEFT LOWER EXTREMITY:  Inspection/examination of the left lower extremity does not show any tenderness, deformity or injury. Range of motion is full. There is no gross instability. There are no rashes, ulcerations or lesions. Strength and tone are normal.    Hyperlordosis of the back with tight rectus on testing. Pain in the lowest aspect of the lumbar spine a t the LS junction. Diagnostic Testing:     lumbar MRI showing a central disc protrusion with mild loss of height L5-S1 and moderate stenosis. X-rays show mild discogenic spondylosis without acute fracture        Impression:    Lumbar djd with disc degeneration. Improved pain overall with better mobility and activtiy level.             Have discussed chirpractic intervention, additional physical therapy  As needed and continued efforts at exercise program. Patient able to walk, steady on feet, eating and states he is ready to go home, does not have a ride and may need transportation arranged. Patient provided verbal instruction about alcohol abstinence however left prior to signing paperwork

## 2020-04-06 ENCOUNTER — TELEPHONE (OUTPATIENT)
Dept: ORTHOPEDIC SURGERY | Age: 50
End: 2020-04-06

## 2020-04-07 ENCOUNTER — TELEPHONE (OUTPATIENT)
Dept: ORTHOPEDIC SURGERY | Age: 50
End: 2020-04-07

## 2020-04-09 ENCOUNTER — VIRTUAL VISIT (OUTPATIENT)
Dept: ORTHOPEDIC SURGERY | Age: 50
End: 2020-04-09
Payer: MEDICAID

## 2020-04-09 PROCEDURE — 99442 PR PHYS/QHP TELEPHONE EVALUATION 11-20 MIN: CPT | Performed by: PHYSICIAN ASSISTANT

## 2020-04-13 ENCOUNTER — TELEPHONE (OUTPATIENT)
Dept: ORTHOPEDIC SURGERY | Age: 50
End: 2020-04-13

## 2020-04-16 ENCOUNTER — OFFICE VISIT (OUTPATIENT)
Dept: ORTHOPEDIC SURGERY | Age: 50
End: 2020-04-16
Payer: MEDICAID

## 2020-04-16 ENCOUNTER — TELEPHONE (OUTPATIENT)
Dept: ORTHOPEDIC SURGERY | Age: 50
End: 2020-04-16

## 2020-04-16 VITALS
DIASTOLIC BLOOD PRESSURE: 70 MMHG | BODY MASS INDEX: 34.5 KG/M2 | SYSTOLIC BLOOD PRESSURE: 112 MMHG | WEIGHT: 219.8 LBS | TEMPERATURE: 98.8 F | HEIGHT: 67 IN | HEART RATE: 76 BPM

## 2020-04-16 PROCEDURE — 99214 OFFICE O/P EST MOD 30 MIN: CPT | Performed by: ORTHOPAEDIC SURGERY

## 2020-04-16 PROCEDURE — G8427 DOCREV CUR MEDS BY ELIG CLIN: HCPCS | Performed by: ORTHOPAEDIC SURGERY

## 2020-04-16 PROCEDURE — 1036F TOBACCO NON-USER: CPT | Performed by: ORTHOPAEDIC SURGERY

## 2020-04-16 PROCEDURE — G8417 CALC BMI ABV UP PARAM F/U: HCPCS | Performed by: ORTHOPAEDIC SURGERY

## 2020-04-16 RX ORDER — TRAMADOL HYDROCHLORIDE 50 MG/1
50 TABLET ORAL EVERY 4 HOURS PRN
Qty: 42 TABLET | Refills: 0 | Status: SHIPPED | OUTPATIENT
Start: 2020-04-16 | End: 2021-05-27 | Stop reason: SDUPTHER

## 2020-04-16 RX ORDER — TIZANIDINE 4 MG/1
4 TABLET ORAL NIGHTLY PRN
Qty: 30 TABLET | Refills: 0 | Status: SHIPPED | OUTPATIENT
Start: 2020-04-16 | End: 2020-05-12

## 2020-04-16 NOTE — PROGRESS NOTES
FOLLOW UP: SPINE    CHIEF COMPLAINT:    Chief Complaint   Patient presents with    Back Pain     L-Spine     Currently:     Increased pain in the low back. 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 Back pain started as small amount of irritation with increase during the day. Previously. No chiropractic intervention. Doing better overall. Now moved to [place]. Moving well overall. 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 Previously:    Evaluation today with market increase in pain after attempted return to work. Symptoms are now more on the left side greater than the right with radiation into the buttock and down the leg    No bowel or bladder dysfunction  Limited ability to mobilize as well as discomfort with sitting and sleeping have prompted her early return to the office          Previously:  After last AYAAN she felt her pain was markedly worse to the point of inability to get out of the house one week later  sympotms were not associated with any fever, chills radiation of pain down the leg, or bowel bladder changes. Irritation of the back increased overall initially post epidural and now are decreased again. Symptoms are not better than before the ayaan however although she does state that the pain in the leg may have changed. Previously:    HISTORY OF PRESENT ILLNESS:                The patient is a 51 y.o. female initially seen in consultation by Dr Miky Coe for back pain here to follow-up after midline L5-S1 LESI #1 9/10/2019 for aching low back pain which she relates to a work injury April 5th. Back pain is constant. Symptoms are exacerbated by \"everything\". Some relief with resting. She denies any improvement with recent AYAAN. She now states she feels some \"tightness\" in her back. Other conservative care includes PT, naproxen, baclofen.   Overall she is not improved. She denies any clear-cut lower extremity radiating pain, no progressive numbness tingling weakness. Past Medical History:   Diagnosis Date    Anxiety     Chronic back pain     Headache(784.0)     Hx of blood clots     march 2016-was on xarelto for 6months    Obstructive apnea     Seizures (HCC)     states no longer  has them-last one was in 2013-june           The pain assessment was noted & reviewed in the medical record today. Current/Past Treatment:   · Physical Therapy: X9 visits Visit  · Chiropractic:     · Injection:   9/10/19 Midline L5/S1 interlaminar epidural injection #1--0% improved  Medications:            NSAIDS: Naprosyn            Muscle relaxer: Baclofen            Steriods:              Neuropathic medications:              Opioids:            Other:   · Surgery/Consult:    Work Status/Functionality: Off work         Past Medical History:   Diagnosis Date    Anxiety     Chronic back pain     Headache(784.0)     Hx of blood clots     march 2016-was on xarelto for 6months    Obstructive apnea     Seizures ([address]     states no longer  has them-last one was in 2013-june      Past Surgical History:     Past Surgical History:   Procedure Laterality Date    EPIDURAL STEROID INJECTION N/A 9/10/2019    MIDLINE LUMBAR FIVE SACRAL ONE EPIDURAL STEROID INJECTION SITE CONFIRMED BY FLUOROSCOPY performed by Leonor Gray MD at [address] Right 2003    RIGHT KNEE    KNEE SURGERY Left 2012    LASIK Bilateral 2004    PARTIAL HYSTERECTOMY  11.4.16    Dr. Noman Dillon       Current Medications:     Current Outpatient Medications:     Multiple Vitamins-Minerals (MULTIVITAMIN ADULT PO), Take 1 tablet by mouth daily, Disp: , Rfl:   Allergies:  Latex; Bee pollen; and Lime flavor [flavoring agent]  Social History:    reports that she quit smoking about 32 years ago. Her smoking use included cigarettes.  She quit after 1.00 year of goround with spasm . Unable to get within 2 feet. · Sitting comfrotably. ·   · Strength:   Strength testing is 5/5 in all muscle groups tested although some give way weakness is noted on the left side quad anterior tib and plantarflexion  ·   · Special Tests:   Straight leg raise and crossed SLR negative on the right although mildly positive on the left. Leg length and pelvis level.  0 out of 5 Minesh's signs. · Skin: There are no rashes, ulcerations or lesions. · Reflexes: Reflexes are symmetrically 2+ at the patellar and ankle tendons. Clonus absent bilaterally at the feet. · Gait & station: Normal gait  · Additional Examinations:   · RIGHT LOWER EXTREMITY: Inspection/examination of the right lower extremity does not show any tenderness, deformity or injury. Range of motion is full. There is no gross instability. There are no rashes, ulcerations or lesions. Strength and tone are normal.  ·   · LEFT LOWER EXTREMITY:  Inspection/examination of the left lower extremity does not show any tenderness, deformity or injury. Range of motion is full. There is no gross instability. There are no rashes, ulcerations or lesions. Strength and tone are normal.    Hyperlordosis of the back with tight rectus on testing. Pain in the lowest aspect of the lumbar spine a t the LS junction. Diagnostic Testing:     lumbar MRI showing a central disc protrusion with mild loss of height L5-S1 and moderate stenosis. 6 lumbar vertebra. X-rays show mild discogenic spondylosis without acute fracture        Impression:    Lumbar djd with disc degeneration. Improved pain overall with better mobility and activtiy level. Have discussed chirpractic intervention, additional physical therapy  As needed and continued efforts at exercise program.   When COVID resolved. Plan for additional work restriction and start on medications for spasm.

## 2020-04-20 ENCOUNTER — TELEPHONE (OUTPATIENT)
Dept: ORTHOPEDIC SURGERY | Age: 50
End: 2020-04-20

## 2020-05-12 RX ORDER — TIZANIDINE 4 MG/1
4 TABLET ORAL NIGHTLY PRN
Qty: 30 TABLET | Refills: 0 | Status: SHIPPED | OUTPATIENT
Start: 2020-05-12 | End: 2020-05-28

## 2020-05-28 ENCOUNTER — OFFICE VISIT (OUTPATIENT)
Dept: ORTHOPEDIC SURGERY | Age: 50
End: 2020-05-28
Payer: MEDICAID

## 2020-05-28 VITALS
HEIGHT: 67 IN | HEART RATE: 81 BPM | BODY MASS INDEX: 34.5 KG/M2 | TEMPERATURE: 98.3 F | WEIGHT: 219.8 LBS | DIASTOLIC BLOOD PRESSURE: 62 MMHG | SYSTOLIC BLOOD PRESSURE: 100 MMHG

## 2020-05-28 PROCEDURE — 99213 OFFICE O/P EST LOW 20 MIN: CPT | Performed by: ORTHOPAEDIC SURGERY

## 2020-05-28 PROCEDURE — 1036F TOBACCO NON-USER: CPT | Performed by: ORTHOPAEDIC SURGERY

## 2020-05-28 PROCEDURE — G8427 DOCREV CUR MEDS BY ELIG CLIN: HCPCS | Performed by: ORTHOPAEDIC SURGERY

## 2020-05-28 PROCEDURE — G8417 CALC BMI ABV UP PARAM F/U: HCPCS | Performed by: ORTHOPAEDIC SURGERY

## 2020-05-28 NOTE — PROGRESS NOTES
FOLLOW UP: SPINE    CHIEF COMPLAINT:    Chief Complaint   Patient presents with    Back Pain     L-Spine     Currently:     Had adjustment with improvement in pain. Pain is controlled overall in current work detail. Doing therapy with back , treadmill and stretching activites. Previously    Increased pain in the low back. 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 Back pain started as small amount of irritation with increase during the day. Previously. No chiropractic intervention. Doing better overall. Now moved to [facility]. Moving well overall. 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 Previously:    Evaluation today with market increase in pain after attempted return to work. Symptoms are now more on the left side greater than the right with radiation into the buttock and down the leg    No bowel or bladder dysfunction  Limited ability to mobilize as well as discomfort with sitting and sleeping have prompted her early return to the office          Previously:  After last AYAAN she felt her pain was markedly worse to the point of inability to get out of the house one week later  sympotms were not associated with any fever, chills radiation of pain down the leg, or bowel bladder changes. Irritation of the back increased overall initially post epidural and now are decreased again. Symptoms are not better than before the ayaan however although she does state that the pain in the leg may have changed. Previously:    HISTORY OF PRESENT ILLNESS:                The patient is a 51 y.o. female initially seen in consultation by Dr Juancho Dean for back pain here to follow-up after midline L5-S1 LESI #1 9/10/2019 for aching low back pain which she relates to a work injury April 5th. Back pain is constant. Symptoms are exacerbated by \"everything\". Some relief with resting.

## 2020-07-09 ENCOUNTER — OFFICE VISIT (OUTPATIENT)
Dept: ORTHOPEDIC SURGERY | Age: 50
End: 2020-07-09
Payer: MEDICAID

## 2020-07-09 ENCOUNTER — TELEPHONE (OUTPATIENT)
Dept: PULMONOLOGY | Age: 50
End: 2020-07-09

## 2020-07-09 VITALS
SYSTOLIC BLOOD PRESSURE: 129 MMHG | TEMPERATURE: 98.2 F | HEART RATE: 69 BPM | BODY MASS INDEX: 34.5 KG/M2 | WEIGHT: 219.8 LBS | DIASTOLIC BLOOD PRESSURE: 79 MMHG | HEIGHT: 67 IN

## 2020-07-09 PROCEDURE — 1036F TOBACCO NON-USER: CPT | Performed by: PHYSICIAN ASSISTANT

## 2020-07-09 PROCEDURE — 99213 OFFICE O/P EST LOW 20 MIN: CPT | Performed by: PHYSICIAN ASSISTANT

## 2020-07-09 PROCEDURE — G8417 CALC BMI ABV UP PARAM F/U: HCPCS | Performed by: PHYSICIAN ASSISTANT

## 2020-07-09 PROCEDURE — G8427 DOCREV CUR MEDS BY ELIG CLIN: HCPCS | Performed by: PHYSICIAN ASSISTANT

## 2020-07-09 NOTE — PROGRESS NOTES
FOLLOW UP: SPINE    CHIEF COMPLAINT:    Chief Complaint   Patient presents with    Follow-up     lower back      Currently: 7/9/2020  Patient for follow-up regarding chronic low back pain. She notes that overall she is doing better she has periods where intermittently the back will flareup where she utilizes topical medications as well as her TENS unit and rest stretching to help calm it down settle it but it has been doing well. She is continuing to do physical therapy and exercises on her own at home which are helping to improve her overall strength. She notes that now she is working the door as a  at [employer] and is standing outside [employer] as they come in and out to make sure they do not go over the allowed census within the building at one time. She notes that when she is allowed to utilize a chair stool she does much better but on the days when they do not allow her to utilize a chair or stool as she reaches the end of 4-1/2 to 5 hours she starts to notice the back start to increase in irritation and tighten up on her. Denies numbness burning tingling radiating pains down the leg notes that it is mostly a irritation and tightness across the low back. Previously: 5/28/2020    Had adjustment with improvement in pain. Pain is controlled overall in current work detail. Doing therapy with back , treadmill and stretching activites. Previously    Increased pain in the low back. 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 Back pain started as small amount of irritation with increase during the day. Previously. No chiropractic intervention. Doing better overall. Now moved to [place]. Moving well overall. 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 Previously:    Evaluation today with market increase in pain after attempted return to work.   Symptoms are now more on the left side greater than the right with radiation into the buttock and down the leg    No bowel or bladder dysfunction  Limited ability to mobilize as well as discomfort with sitting and sleeping have prompted her early return to the office          Previously:  After last AYAAN she felt her pain was markedly worse to the point of inability to get out of the house one week later  sympotms were not associated with any fever, chills radiation of pain down the leg, or bowel bladder changes. Irritation of the back increased overall initially post epidural and now are decreased again. Symptoms are not better than before the ayaan however although she does state that the pain in the leg may have changed. Previously:    HISTORY OF PRESENT ILLNESS:                The patient is a 51 y.o. female initially seen in consultation by Dr Betzy Beatty for back pain here to follow-up after midline L5-S1 LESI #1 9/10/2019 for aching low back pain which she relates to a work injury April 5th. Back pain is constant. Symptoms are exacerbated by \"everything\". Some relief with resting. She denies any improvement with recent AYAAN. She now states she feels some \"tightness\" in her back. Other conservative care includes PT, naproxen, baclofen. Overall she is not improved. She denies any clear-cut lower extremity radiating pain, no progressive numbness tingling weakness. Past Medical History:   Diagnosis Date    Anxiety     Chronic back pain     Headache(784.0)     Hx of blood clots     march 2016-was on xarelto for 6months    Obstructive apnea     Seizures (HCC)     states no longer  has them-last one was in 2013-june           The pain assessment was noted & reviewed in the medical record today.      Current/Past Treatment:   · Physical Therapy: X9 visits Visit  · Chiropractic:     · Injection:   9/10/19 Midline L5/S1 interlaminar epidural injection #1--0% improved  Medications:            NSAIDS: Naprosyn            Muscle relaxer: Baclofen            Steriods:              Neuropathic medications:              Opioids:            Other:   · Surgery/Consult:    Work Status/Functionality: Off work         Past Medical History:   Diagnosis Date    Anxiety     Chronic back pain     Headache(784.0)     Hx of blood clots     march 2016-was on xarelto for 6months    Obstructive apnea     Seizures ([facility] [address]     states no longer  has them-last one was in 2013-june      Past Surgical History:     Past Surgical History:   Procedure Laterality Date    EPIDURAL STEROID INJECTION N/A 9/10/2019    MIDLINE LUMBAR FIVE SACRAL ONE EPIDURAL STEROID INJECTION SITE CONFIRMED BY FLUOROSCOPY performed by Sena Mackay MD at [address] Right 2003    RIGHT KNEE    KNEE SURGERY Left 2012    LASIK Bilateral 2004    [facility]CTOMY  11.4.16    [facility]       Current Medications:     Current Outpatient Medications:     Multiple Vitamins-Minerals (MULTIVITAMIN ADULT PO), Take 1 tablet by mouth daily, Disp: , Rfl:   Allergies:  Latex; Bee pollen; and Lime flavor [flavoring agent]  Social History:    reports that she quit smoking about 32 years ago. Her smoking use included cigarettes. She quit after 1.00 year of use. She has never used smokeless tobacco. She reports that she does not drink alcohol or use drugs.   Family History:   Family History   Problem Relation Age of Onset    Heart Disease Father     Other Father         LILIAN    Diabetes Father     Alcohol Abuse Father     Obesity Father     Alzheimer's Disease Other         Grandparents    Breast Cancer Other         Aunt    Lung Cancer Other         Grandma    Colon Cancer Other         Grandpa    Diabetes Other         Grandma    Seizures Sister         Uncle, son       REVIEW OF SYSTEMS: Full ROS noted & scanned   CONSTITUTIONAL: Denies unexplained weight loss, fevers, chills or fatigue  NEUROLOGICAL: Denies unsteady gait or progressive weakness      PHYSICAL EXAM:    Vitals: Blood pressure 129/79, pulse 69, temperature 98.2 °F (36.8 °C), temperature source Infrared, height 5' 7.01\" (1.702 m), weight 219 lb 12.8 oz (99.7 kg), last menstrual period 11/02/2016, not currently breastfeeding. GENERAL EXAM:  · General Apparence: Patient is adequately groomed with no evidence of malnutrition. · Orientation: The patient is oriented to time, place and person. · Mood & Affect:The patient's mood and affect are appropriate   · Lymphatic: The lymphatic examination bilaterally reveals all areas to be without enlargement or induration  · Sensation: Sensation is intact without deficit  · Coordination/Balance: Good coordination     LUMBAR/SACRAL EXAMINATION:  · Inspection: Local inspection shows no step-off or bruising. Lumbar alignment is normal.  Sagittal and Coronal balance is neutral.    Some stiffness with standing and walking and notable irritability with facet testing  · Palpation:   No evidence of tenderness at the midline. Tenderness bilaterally at the paraspinal and trochanters. There is no step-off or paraspinal spasm. · Range of Motion: 40 degrees of flexion, 5 degrees extension  · Spasm of the low back with irritation in the iliosacral spine reduced compared to previous evaluation  · Rectus tightness. · Hamstring  Unable to get within 2 feet. · Sitting comfrotably. ·   · Strength:   Strength testing is 5/5 in all muscle groups tested although some give way weakness is noted on the left side quad anterior tib and plantarflexion  ·   · Special Tests:   Straight leg raise and crossed SLR negative on the right although mildly positive on the left. Leg length and pelvis level.  0 out of 5 Minesh's signs. · Skin: There are no rashes, ulcerations or lesions. · Reflexes: Reflexes are symmetrically 2+ at the patellar and ankle tendons.   Clonus absent bilaterally at the feet.  · Gait & station: Normal gait  · Additional Examinations:   · RIGHT LOWER EXTREMITY: Inspection/examination of the right lower extremity does not show any tenderness, deformity or injury. Range of motion is full. There is no gross instability. There are no rashes, ulcerations or lesions. Strength and tone are normal.  ·   · LEFT LOWER EXTREMITY:  Inspection/examination of the left lower extremity does not show any tenderness, deformity or injury. Range of motion is full. There is no gross instability. There are no rashes, ulcerations or lesions. Strength and tone are normal.    Hyperlordosis of the back still present with tight rectus on testing. Pain in the lowest aspect of the lumbar spine a t the LS junction. Diagnostic Testing:     lumbar MRI showing a central disc protrusion with mild loss of height L5-S1 and moderate stenosis. 6 lumbar vertebra. X-rays show mild discogenic spondylosis without acute fracture        Impression:    Lumbar djd with disc degeneration. Improved pain overall with better mobility and activtiy level.     -Advised patient to continue with current work restrictions of limiting work to 5 hours at a time for the next 6 weeks. -Based upon patient's symptom recurrence with work performance extending work hours would not be advised as it would tend to aggravate patient's back more until she can continue with her current physical therapy and home therapy regiment to gain more control and strengthening across her back will maintain current work restrictions. As a strengthening occurs will steadily increase work restrictions to begin to reach an 8-hour day. -Advised for patient to follow-up in 6 weeks for repeat evaluation  -Advised for patient to continue current regiment for pain relief including TENS unit utilization stretching ice and utilization of topical anti-inflammatories to help.        Have discussed chirpractic intervention, additional physical therapy  As

## 2020-07-22 NOTE — PROGRESS NOTES
Jaden Ridley         : 1970    Diagnosis: [x] LILIAN (G47.33) [] CSA (G47.31) [] Apnea (G47.30)   Length of Need: [x] 1 Months [] 99 Months [] Other:    Machine (NAVID!): [] Respironics Dream Station      Auto [] ResMed AirSense     Auto [] Other:     []  CPAP () [] Bilevel ()   Mode: [] Auto [] Spontaneous    Mode: [] Auto [] Spontaneous                            Comfort Settings:   - Ramp Pressure:  cmH2O                                        - Ramp time: 15 min                                     -  Flex/EPR - 3 full time                                    - For ResMed Bilevel (TiMax-4 sec   TiMin- 0.2 sec)     Humidifier: [] Heated ()        [] Water chamber replacement ()/ 1 per 6 months        Mask:   [] Nasal () /1 per 3 months [] Full Face () /1 per 3 months   [] Patient choice -Size and fit mask [] Patient Choice - Size and fit mask   [] Dispense:  [] Dispense:    [] Headgear () / 1 per 3 months [] Headgear () / 1 per 3 months   [] Replacement Nasal Cushion ()/2 per month [] Interface Replacement ()/1 per month   [] Replacement Nasal Pillows ()/2 per month         Tubing: [] Heated ()/1 per 3 months    [x] Standard ()/1 per 3 months [] Other:           Filters: [] Non-disposable ()/1 per 6 months     [] Ultra-Fine, Disposable ()/2 per month        Miscellaneous: [] Chin Strap ()/ 1 per 6 months [] O2 bleed-in:       LPM   [] Oximetry on CPAP/Bilevel []  Other:    [] Modem: ()         Start Order Date: 20    MEDICAL JUSTIFICATION:  I, the undersigned, certify that the above prescribed supplies are medically necessary for this patients wellbeing. In my opinion, the supplies are both reasonable and necessary in reference to accepted standards of medicalpractice in treatment of this patients condition.     Louie Keys, APRN - CNP      NPI: [id number]       Order Signed Date: 20    Electronically signed by AMARILIS Ngo CNP on 7/22/2020 at 9:22 AM

## 2020-08-20 ENCOUNTER — OFFICE VISIT (OUTPATIENT)
Dept: ORTHOPEDIC SURGERY | Age: 50
End: 2020-08-20
Payer: MEDICAID

## 2020-08-20 VITALS
SYSTOLIC BLOOD PRESSURE: 103 MMHG | TEMPERATURE: 98.1 F | HEART RATE: 79 BPM | DIASTOLIC BLOOD PRESSURE: 63 MMHG | WEIGHT: 219 LBS | BODY MASS INDEX: 34.37 KG/M2 | HEIGHT: 67 IN

## 2020-08-20 PROBLEM — M51.36 DISC DEGENERATION, LUMBAR: Status: ACTIVE | Noted: 2020-08-20

## 2020-08-20 PROBLEM — M51.369 DISC DEGENERATION, LUMBAR: Status: ACTIVE | Noted: 2020-08-20

## 2020-08-20 PROCEDURE — G8417 CALC BMI ABV UP PARAM F/U: HCPCS | Performed by: ORTHOPAEDIC SURGERY

## 2020-08-20 PROCEDURE — G8427 DOCREV CUR MEDS BY ELIG CLIN: HCPCS | Performed by: ORTHOPAEDIC SURGERY

## 2020-08-20 PROCEDURE — 99213 OFFICE O/P EST LOW 20 MIN: CPT | Performed by: ORTHOPAEDIC SURGERY

## 2020-08-20 PROCEDURE — 1036F TOBACCO NON-USER: CPT | Performed by: ORTHOPAEDIC SURGERY

## 2020-08-20 NOTE — LETTER
[facility] [address]  Phone: [phone]  Fax: [phone]    Geena Soto MD        August 20, 2020     Patient: Zoe He   YOB: 1970   Date of Visit: 8/20/2020     To Whom It May Concern:     It is my medical opinion that Bharti Campos may return to work on 08/20/2020 with the following restrictions:     No lifting over 25 pounds, no bending to the ground, no loading groceries into a car and 6 hour work days. These restrictions are in place for 3 months. If you have any questions or concerns, please don't hesitate to call.         Geena Soto MD

## 2020-08-20 NOTE — LETTER
[facility] [address]  Phone: [phone]  Fax: [phone]    Adeel Adams MD        August 20, 2020     Patient: Linda Vaughn   YOB: 1970   Date of Visit: 8/20/2020       To Whom It May Concern:     It is my medical opinion that Johnystu Gant may return to work on 08/20/2020 with the following restrictions:     No lifting over 25 pounds, no bending to the ground, no loading groceries into a car and 6 hour work days. These restrictions are in place for 6 weeks .      If you have any questions or concerns, please don't hesitate to call.     Sincerely,      Adeel Adams MD

## 2020-08-20 NOTE — PROGRESS NOTES
FOLLOW UP: SPINE    CHIEF COMPLAINT:    Chief Complaint   Patient presents with    Back Pain     L-SPINE     Currently:     Patient is doing better with overall back pain with current job of standing at [employer]. Occasional increase in pain overall with bending over and picking up objects. Only using tens unit at this point and some biofreeze. 7/9/2020  Patient for follow-up regarding chronic low back pain. She notes that overall she is doing better she has periods where intermittently the back will flareup where she utilizes topical medications as well as her TENS unit and rest stretching to help calm it down settle it but it has been doing well. She is continuing to do physical therapy and exercises on her own at home which are helping to improve her overall strength. She notes that now she is working the door as a  at [employer] and is standing outside [employer] as they come in and out to make sure they do not go over the allowed census within the building at one time. She notes that when she is allowed to utilize a chair stool she does much better but on the days when they do not allow her to utilize a chair or stool as she reaches the end of 4-1/2 to 5 hours she starts to notice the back start to increase in irritation and tighten up on her. Denies numbness burning tingling radiating pains down the leg notes that it is mostly a irritation and tightness across the low back. Previously: 5/28/2020    Had adjustment with improvement in pain. Pain is controlled overall in current work detail. Doing therapy with back , treadmill and stretching activites. Previously    Increased pain in the low back. 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 Back pain started as small amount of irritation with increase during the day. Previously. No chiropractic intervention. Doing better overall.    Now moved to [place]. Moving well overall. 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 Previously:    Evaluation today with market increase in pain after attempted return to work. Symptoms are now more on the left side greater than the right with radiation into the buttock and down the leg    No bowel or bladder dysfunction  Limited ability to mobilize as well as discomfort with sitting and sleeping have prompted her early return to the office          Previously:  After last AYAAN she felt her pain was markedly worse to the point of inability to get out of the house one week later  sympotms were not associated with any fever, chills radiation of pain down the leg, or bowel bladder changes. Irritation of the back increased overall initially post epidural and now are decreased again. Symptoms are not better than before the ayaan however although she does state that the pain in the leg may have changed. Previously:    HISTORY OF PRESENT ILLNESS:                The patient is a 51 y.o. female initially seen in consultation by Dr Rocael Guzmán for back pain here to follow-up after midline L5-S1 LESI #1 9/10/2019 for aching low back pain which she relates to a work injury April 5th. Back pain is constant. Symptoms are exacerbated by \"everything\". Some relief with resting. She denies any improvement with recent AYAAN. She now states she feels some \"tightness\" in her back. Other conservative care includes PT, naproxen, baclofen. Overall she is not improved. She denies any clear-cut lower extremity radiating pain, no progressive numbness tingling weakness.         Past Medical History:   Diagnosis Date    Anxiety     Chronic back pain     Headache(784.0)     Hx of blood clots     march 2016-was on xarelto for 6months    Obstructive apnea     Seizures (HCC)     states no longer  has them-last one was in 2013-june           The pain assessment was noted & reviewed in the medical record today. Current/Past Treatment:   · Physical Therapy: X9 visits Visit  · Chiropractic:     · Injection:   9/10/19 Midline L5/S1 interlaminar epidural injection #1--0% improved  Medications:            NSAIDS: Naprosyn            Muscle relaxer: Baclofen            Steriods:              Neuropathic medications:              Opioids:            Other:   · Surgery/Consult:    Work Status/Functionality: Off work         Past Medical History:   Diagnosis Date    Anxiety     Chronic back pain     Headache(784.0)     Hx of blood clots     march 2016-was on xarelto for 6months    Obstructive apnea     Seizures ([facility] [address]     states no longer  has them-last one was in 2013-june      Past Surgical History:     Past Surgical History:   Procedure Laterality Date    EPIDURAL STEROID INJECTION N/A 9/10/2019    MIDLINE LUMBAR FIVE SACRAL ONE EPIDURAL STEROID INJECTION SITE CONFIRMED BY FLUOROSCOPY performed by Tad June MD at [facility] Right 2003    RIGHT KNEE    KNEE SURGERY Left 2012    LASIK Bilateral 2004    PARTIAL HYSTERECTOMY  11.4.16    Dr. Blake Lam       Current Medications:     Current Outpatient Medications:     Multiple Vitamins-Minerals (MULTIVITAMIN ADULT PO), Take 1 tablet by mouth daily, Disp: , Rfl:   Allergies:  Latex; Bee pollen; and Lime flavor [flavoring agent]  Social History:    reports that she quit smoking about 32 years ago. Her smoking use included cigarettes. She quit after 1.00 year of use. She has never used smokeless tobacco. She reports that she does not drink alcohol or use drugs.   Family History:   Family History   Problem Relation Age of Onset    Heart Disease Father     Other Father         LILIAN    Diabetes Father     Alcohol Abuse Father     Obesity Father     Alzheimer's Disease Other         Grandparents    Breast Cancer Other         Aunt    Lung Cancer Other         Grandma    Colon signs.        · Skin: There are no rashes, ulcerations or lesions. · Reflexes: Reflexes are symmetrically 2+ at the patellar and ankle tendons. Clonus absent bilaterally at the feet. · Gait & station: Normal gait  · Additional Examinations:   · RIGHT LOWER EXTREMITY: Inspection/examination of the right lower extremity does not show any tenderness, deformity or injury. Range of motion is full. There is no gross instability. There are no rashes, ulcerations or lesions. Strength and tone are normal.  ·   · LEFT LOWER EXTREMITY:  Inspection/examination of the left lower extremity does not show any tenderness, deformity or injury. Range of motion is full. There is no gross instability. There are no rashes, ulcerations or lesions. Strength and tone are normal.    Hyperlordosis of the back still present with tight rectus on testing. Pain in the lowest aspect of the lumbar spine a t the LS junction. Diagnostic Testing:     lumbar MRI showing a central disc protrusion with mild loss of height L5-S1 and moderate stenosis. 6 lumbar vertebra. X-rays show mild discogenic spondylosis without acute fracture        Impression:    Lumbar djd with disc degeneration. Improved pain overall with better mobility and activtiy level.     -Advised patient to continue with current work restrictions of limiting work to 5 hours at a time for the next 6 weeks. -Based upon patient's symptom recurrence with work performance extending work hours would not be advised as it would tend to aggravate patient's back more until she can continue with her current physical therapy and home therapy regiment to gain more control and strengthening across her back will maintain current work restrictions. As a strengthening occurs will steadily increase work restrictions to begin to reach an 8-hour day.      -Advised for patient to continue current regiment for pain relief including TENS unit utilization stretching ice and utilization of

## 2020-11-13 ENCOUNTER — TELEPHONE (OUTPATIENT)
Dept: ORTHOPEDIC SURGERY | Age: 50
End: 2020-11-13

## 2020-11-19 ENCOUNTER — OFFICE VISIT (OUTPATIENT)
Dept: ORTHOPEDIC SURGERY | Age: 50
End: 2020-11-19
Payer: MEDICAID

## 2020-11-19 VITALS
BODY MASS INDEX: 34.37 KG/M2 | HEIGHT: 67 IN | DIASTOLIC BLOOD PRESSURE: 56 MMHG | WEIGHT: 219 LBS | SYSTOLIC BLOOD PRESSURE: 94 MMHG | HEART RATE: 79 BPM | TEMPERATURE: 97.9 F

## 2020-11-19 PROCEDURE — G8427 DOCREV CUR MEDS BY ELIG CLIN: HCPCS | Performed by: ORTHOPAEDIC SURGERY

## 2020-11-19 PROCEDURE — G8484 FLU IMMUNIZE NO ADMIN: HCPCS | Performed by: ORTHOPAEDIC SURGERY

## 2020-11-19 PROCEDURE — 99213 OFFICE O/P EST LOW 20 MIN: CPT | Performed by: ORTHOPAEDIC SURGERY

## 2020-11-19 PROCEDURE — G8417 CALC BMI ABV UP PARAM F/U: HCPCS | Performed by: ORTHOPAEDIC SURGERY

## 2020-11-19 PROCEDURE — 1036F TOBACCO NON-USER: CPT | Performed by: ORTHOPAEDIC SURGERY

## 2020-11-19 NOTE — LETTER
11/19/20        RE: Peña Marcy      Patient is currently under evaluation through our office. Please note that Ny may need occasional periods of sitting based upon any increase in her pain levels. This may necessitate up to 15 to 20 minutes of sitting time but there are days or weeks in which she may not require this at all. Please maintain 6 hours of work per day for additional 3 months to maintain her at the optimum work ability.                                Donnie Del Rio MD

## 2020-11-19 NOTE — PROGRESS NOTES
FOLLOW UP: SPINE    CHIEF COMPLAINT:    Chief Complaint   Patient presents with    Back Pain     L-Spine     Currently:       PATIENT IS ON NO MEDICATIONS FOR HER BACK PAIN>   Difficulty with losing weight    Patient is doing better with overall back pain with current job of standing at [employer]. Occasional increase in pain overall with bending over and picking up objects. Only using tens unit at this point and some biofreeze. 7/9/2020  Patient for follow-up regarding chronic low back pain. She notes that overall she is doing better she has periods where intermittently the back will flareup where she utilizes topical medications as well as her TENS unit and rest stretching to help calm it down settle it but it has been doing well. She is continuing to do physical therapy and exercises on her own at home which are helping to improve her overall strength. She notes that now she is working the door as a  at [employer] and is standing outside [employer] as they come in and out to make sure they do not go over the allowed census within the building at one time. She notes that when she is allowed to utilize a chair stool she does much better but on the days when they do not allow her to utilize a chair or stool as she reaches the end of 4-1/2 to 5 hours she starts to notice the back start to increase in irritation and tighten up on her. Denies numbness burning tingling radiating pains down the leg notes that it is mostly a irritation and tightness across the low back. Previously: 5/28/2020    Had adjustment with improvement in pain. Pain is controlled overall in current work detail. Doing therapy with back , treadmill and stretching activites. Previously    Increased pain in the low back. 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 Back pain started as small amount of irritation with increase during the day. Previously. No chiropractic intervention. Doing better overall. Now moved to [place]. Moving well overall. 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 Pending job breakfast at [facility] and [employer]. Previously:    Evaluation today with market increase in pain after attempted return to work. Symptoms are now more on the left side greater than the right with radiation into the buttock and down the leg    No bowel or bladder dysfunction  Limited ability to mobilize as well as discomfort with sitting and sleeping have prompted her early return to the office          Previously:  After last AYAAN she felt her pain was markedly worse to the point of inability to get out of the house one week later  sympotms were not associated with any fever, chills radiation of pain down the leg, or bowel bladder changes. Irritation of the back increased overall initially post epidural and now are decreased again. Symptoms are not better than before the ayaan however although she does state that the pain in the leg may have changed. Previously:    HISTORY OF PRESENT ILLNESS:                The patient is a 53 y.o. female initially seen in consultation by Dr Brandon Craig for back pain here to follow-up after midline L5-S1 LESI #1 9/10/2019 for aching low back pain which she relates to a work injury April 5th. Back pain is constant. Symptoms are exacerbated by \"everything\". Some relief with resting. She denies any improvement with recent AYAAN. She now states she feels some \"tightness\" in her back. Other conservative care includes PT, naproxen, baclofen. Overall she is not improved. She denies any clear-cut lower extremity radiating pain, no progressive numbness tingling weakness.         Past Medical History:   Diagnosis Date    Anxiety     Chronic back pain     Headache(784.0)     Hx of blood clots     march 2016-was on xarelto for 6months    Obstructive apnea     Seizures ([facility] [address] states no longer  has them-last one was in 2013-june           The pain assessment was noted & reviewed in the medical record today. Current/Past Treatment:   · Physical Therapy: X9 visits Visit  · Chiropractic:     · Injection:   9/10/19 Midline L5/S1 interlaminar epidural injection #1--0% improved  Medications:            NSAIDS: Naprosyn            Muscle relaxer: Baclofen            Steriods:              Neuropathic medications:              Opioids:            Other:   · Surgery/Consult:    Work Status/Functionality: Off work         Past Medical History:   Diagnosis Date    Anxiety     Chronic back pain     Headache(784.0)     Hx of blood clots     march 2016-was on xarelto for 6months    Obstructive apnea     Seizures ([address]     states no longer  has them-last one was in 2013-june      Past Surgical History:     Past Surgical History:   Procedure Laterality Date    EPIDURAL STEROID INJECTION N/A 9/10/2019    MIDLINE LUMBAR FIVE SACRAL ONE EPIDURAL STEROID INJECTION SITE CONFIRMED BY FLUOROSCOPY performed by Ilsa Knight MD at [facility] Right 2003    RIGHT KNEE    KNEE SURGERY Left 2012    LASIK Bilateral 2004    PARTIAL HYSTERECTOMY  11.4.16    Dr. Bereket Harrell       Current Medications:     Current Outpatient Medications:     Multiple Vitamins-Minerals (MULTIVITAMIN ADULT PO), Take 1 tablet by mouth daily, Disp: , Rfl:   Allergies:  Latex; Bee pollen; and Lime flavor [flavoring agent]  Social History:    reports that she quit smoking about 33 years ago. Her smoking use included cigarettes. She quit after 1.00 year of use. She has never used smokeless tobacco. She reports that she does not drink alcohol or use drugs.   Family History:   Family History   Problem Relation Age of Onset    Heart Disease Father     Other Father         LILIAN    Diabetes Father     Alcohol Abuse Father     Obesity Father     Alzheimer's Disease Other Grandparents    Breast Cancer Other         Aunt    Lung Cancer Other         Grandma    Colon Cancer Other         Grandpa    Diabetes Other         Grandma    Seizures Sister         Uncle, son       REVIEW OF SYSTEMS: Full ROS noted & scanned   CONSTITUTIONAL: Denies unexplained weight loss, fevers, chills or fatigue  NEUROLOGICAL: Denies unsteady gait or progressive weakness      PHYSICAL EXAM:    Vitals: Blood pressure (!) 94/56, pulse 79, temperature 97.9 °F (36.6 °C), height 5' 7\" (1.702 m), weight 219 lb (99.3 kg), last menstrual period 11/02/2016, not currently breastfeeding. GENERAL EXAM:  · General Apparence: Patient is adequately groomed with no evidence of malnutrition. · Orientation: The patient is oriented to time, place and person. · Mood & Affect:The patient's mood and affect are appropriate   · Lymphatic: The lymphatic examination bilaterally reveals all areas to be without enlargement or induration  · Sensation: Sensation is intact without deficit  · Coordination/Balance: Good coordination     LUMBAR/SACRAL EXAMINATION:  · Inspection: Local inspection shows no step-off or bruising. Lumbar alignment is normal.  Sagittal and Coronal balance is neutral.    Some stiffness with standing and walking and notable irritability with facet testing  · Palpation:   No evidence of tenderness at the midline. Tenderness bilaterally at the paraspinal and trochanters. And in the Right SI joint greater than left. ·   There is no step-off or paraspinal spasm. · Range of Motion: 40 degrees of flexion, 5 degrees extension  · Spasm of the low back with irritation in the iliosacral spine reduced compared to previous evaluation  · Rectus tightness. · Hamstring  Unable to get within 2 feet. · Sitting comfrotably.    ·   · Strength:   Strength testing is 5/5 in all muscle groups tested although some give way weakness is noted on the left side quad anterior tib and plantarflexion  ·   · Special Tests: Straight leg raise and crossed SLR negative on the right although mildly positive on the left. Leg length and pelvis level.  0 out of 5 Minesh's signs. · Skin: There are no rashes, ulcerations or lesions. · Reflexes: Reflexes are symmetrically 2+ at the patellar and ankle tendons. Clonus absent bilaterally at the feet. · Gait & station: Normal gait  · Additional Examinations:   · RIGHT LOWER EXTREMITY: Inspection/examination of the right lower extremity does not show any tenderness, deformity or injury. Range of motion is full. There is no gross instability. There are no rashes, ulcerations or lesions. Strength and tone are normal.  ·   · LEFT LOWER EXTREMITY:  Inspection/examination of the left lower extremity does not show any tenderness, deformity or injury. Range of motion is full. There is no gross instability. There are no rashes, ulcerations or lesions. Strength and tone are normal.    Hyperlordosis of the back still present with tight rectus on testing. Pain in the lowest aspect of the lumbar spine a t the LS junction. Diagnostic Testing:     lumbar MRI showing a central disc protrusion with mild loss of height L5-S1 and moderate stenosis. 6 lumbar vertebra. X-rays show mild discogenic spondylosis without acute fracture        Impression:    Lumbar djd with disc degeneration. Improved pain overall with better mobility and activtiy level.     -Advised patient to continue with current work restrictions of limiting work to 5 hours at a time for the next 6 weeks. -Based upon patient's symptom recurrence with work performance extending work hours would not be advised as it would tend to aggravate patient's back more until she can continue with her current physical therapy and home therapy regiment to gain more control and strengthening across her back will maintain current work restrictions.   As a strengthening occurs will steadily increase work restrictions to begin to reach an 8-hour day. Have discussed chirpractic intervention, additional physical therapy  As needed and continued efforts at exercise program.          patient will require additional NEMS unit for muscluar stimulation in the back because of atrophy and limited activity levels at this point.

## 2021-02-12 ENCOUNTER — TELEPHONE (OUTPATIENT)
Dept: FAMILY MEDICINE CLINIC | Age: 51
End: 2021-02-12

## 2021-02-12 NOTE — TELEPHONE ENCOUNTER
Have her take a picture of it and send it in Jansen so we have documentation of it . Have her come in on Monday to see me . I did not think it was shingles either because she said the lumps were big.

## 2021-02-12 NOTE — TELEPHONE ENCOUNTER
Pt paged me last night with co bumps down her leg that are large and painful and shaking chills 2-3 days ago  I advised that she go to ER.  pls call her and see how she is doing?

## 2021-02-12 NOTE — TELEPHONE ENCOUNTER
[phone] (home)   Called pt and she said that she went to urgent care up the street from her   Doc told her he doesn't think its shingles. She was given abx and steroids. Was told it was cellulitis. Or possibly a bug bite but did not see a bite or puncture franki on her at all,. Was told if meds didn't help within 2 days to go to ER. Palmira Nelson it was very slim that she has dvt since bumps came about slowly    Pt is taking prednisone and keflex. She will go to ER in 2 days if meds dont help. Was told if any more discoloration or tingling she will go to ER. She went to [facility] urgent care on [address].

## 2021-02-12 NOTE — TELEPHONE ENCOUNTER
Talked to Pt & she does not feel that it's necessary to come in.  She is going to do what Urgent Care told her to do & then if it gets worse she will go to the ER

## 2021-02-13 ENCOUNTER — HOSPITAL ENCOUNTER (INPATIENT)
Age: 51
LOS: 1 days | Discharge: HOME | DRG: 383 | End: 2021-02-14
Attending: STUDENT IN AN ORGANIZED HEALTH CARE EDUCATION/TRAINING PROGRAM | Admitting: STUDENT IN AN ORGANIZED HEALTH CARE EDUCATION/TRAINING PROGRAM
Payer: MEDICAID

## 2021-02-13 ENCOUNTER — APPOINTMENT (OUTPATIENT)
Dept: CT IMAGING | Age: 51
DRG: 383 | End: 2021-02-13
Payer: MEDICAID

## 2021-02-13 DIAGNOSIS — L03.116 LEFT LEG CELLULITIS: Primary | ICD-10-CM

## 2021-02-13 LAB
A/G RATIO: 1 (ref 1.1–2.2)
ALBUMIN SERPL-MCNC: 3.9 G/DL (ref 3.4–5)
ALP BLD-CCNC: 104 U/L (ref 40–129)
ALT SERPL-CCNC: 21 U/L (ref 10–40)
ANION GAP SERPL CALCULATED.3IONS-SCNC: 9 MMOL/L (ref 3–16)
AST SERPL-CCNC: 18 U/L (ref 15–37)
BASOPHILS ABSOLUTE: 0 K/UL (ref 0–0.2)
BASOPHILS RELATIVE PERCENT: 0.1 %
BILIRUB SERPL-MCNC: 0.4 MG/DL (ref 0–1)
BILIRUBIN URINE: NEGATIVE
BLOOD, URINE: NEGATIVE
BUN BLDV-MCNC: 30 MG/DL (ref 7–20)
CALCIUM SERPL-MCNC: 9.3 MG/DL (ref 8.3–10.6)
CHLORIDE BLD-SCNC: 104 MMOL/L (ref 99–110)
CLARITY: CLEAR
CO2: 24 MMOL/L (ref 21–32)
COLOR: YELLOW
CREAT SERPL-MCNC: 0.6 MG/DL (ref 0.6–1.1)
EOSINOPHILS ABSOLUTE: 0 K/UL (ref 0–0.6)
EOSINOPHILS RELATIVE PERCENT: 0.4 %
GFR AFRICAN AMERICAN: >60
GFR NON-AFRICAN AMERICAN: >60
GLOBULIN: 4 G/DL
GLUCOSE BLD-MCNC: 117 MG/DL (ref 70–99)
GLUCOSE URINE: NEGATIVE MG/DL
HCT VFR BLD CALC: 40.8 % (ref 36–48)
HEMOGLOBIN: 13.3 G/DL (ref 12–16)
KETONES, URINE: NEGATIVE MG/DL
LEUKOCYTE ESTERASE, URINE: NEGATIVE
LYMPHOCYTES ABSOLUTE: 1.1 K/UL (ref 1–5.1)
LYMPHOCYTES RELATIVE PERCENT: 10.1 %
MCH RBC QN AUTO: 28 PG (ref 26–34)
MCHC RBC AUTO-ENTMCNC: 32.5 G/DL (ref 31–36)
MCV RBC AUTO: 86.1 FL (ref 80–100)
MICROSCOPIC EXAMINATION: NORMAL
MONOCYTES ABSOLUTE: 0.4 K/UL (ref 0–1.3)
MONOCYTES RELATIVE PERCENT: 4 %
NEUTROPHILS ABSOLUTE: 9.3 K/UL (ref 1.7–7.7)
NEUTROPHILS RELATIVE PERCENT: 85.4 %
NITRITE, URINE: NEGATIVE
PDW BLD-RTO: 14.7 % (ref 12.4–15.4)
PH UA: 6 (ref 5–8)
PLATELET # BLD: 193 K/UL (ref 135–450)
PMV BLD AUTO: 10 FL (ref 5–10.5)
POTASSIUM REFLEX MAGNESIUM: 4.3 MMOL/L (ref 3.5–5.1)
PROTEIN UA: NEGATIVE MG/DL
RBC # BLD: 4.74 M/UL (ref 4–5.2)
SODIUM BLD-SCNC: 137 MMOL/L (ref 136–145)
SPECIFIC GRAVITY UA: >1.03 (ref 1–1.03)
TOTAL PROTEIN: 7.9 G/DL (ref 6.4–8.2)
URINE REFLEX TO CULTURE: NORMAL
URINE TYPE: NORMAL
UROBILINOGEN, URINE: 0.2 E.U./DL
WBC # BLD: 10.9 K/UL (ref 4–11)

## 2021-02-13 PROCEDURE — 6360000002 HC RX W HCPCS: Performed by: GENERAL ACUTE CARE HOSPITAL

## 2021-02-13 PROCEDURE — 96366 THER/PROPH/DIAG IV INF ADDON: CPT

## 2021-02-13 PROCEDURE — 6360000004 HC RX CONTRAST MEDICATION: Performed by: GENERAL ACUTE CARE HOSPITAL

## 2021-02-13 PROCEDURE — 96365 THER/PROPH/DIAG IV INF INIT: CPT

## 2021-02-13 PROCEDURE — 96375 TX/PRO/DX INJ NEW DRUG ADDON: CPT

## 2021-02-13 PROCEDURE — 36415 COLL VENOUS BLD VENIPUNCTURE: CPT

## 2021-02-13 PROCEDURE — 87040 BLOOD CULTURE FOR BACTERIA: CPT

## 2021-02-13 PROCEDURE — 73701 CT LOWER EXTREMITY W/DYE: CPT

## 2021-02-13 PROCEDURE — 85025 COMPLETE CBC W/AUTO DIFF WBC: CPT

## 2021-02-13 PROCEDURE — 81003 URINALYSIS AUTO W/O SCOPE: CPT

## 2021-02-13 PROCEDURE — 80053 COMPREHEN METABOLIC PANEL: CPT

## 2021-02-13 PROCEDURE — 96367 TX/PROPH/DG ADDL SEQ IV INF: CPT

## 2021-02-13 PROCEDURE — 99283 EMERGENCY DEPT VISIT LOW MDM: CPT

## 2021-02-13 PROCEDURE — 2580000003 HC RX 258: Performed by: GENERAL ACUTE CARE HOSPITAL

## 2021-02-13 RX ORDER — ONDANSETRON 2 MG/ML
4 INJECTION INTRAMUSCULAR; INTRAVENOUS ONCE
Status: COMPLETED | OUTPATIENT
Start: 2021-02-13 | End: 2021-02-13

## 2021-02-13 RX ORDER — MORPHINE SULFATE 4 MG/ML
4 INJECTION, SOLUTION INTRAMUSCULAR; INTRAVENOUS ONCE
Status: COMPLETED | OUTPATIENT
Start: 2021-02-13 | End: 2021-02-13

## 2021-02-13 RX ORDER — 0.9 % SODIUM CHLORIDE 0.9 %
1000 INTRAVENOUS SOLUTION INTRAVENOUS ONCE
Status: COMPLETED | OUTPATIENT
Start: 2021-02-13 | End: 2021-02-13

## 2021-02-13 RX ADMIN — Medication 1500 MG: at 22:11

## 2021-02-13 RX ADMIN — PIPERACILLIN AND TAZOBACTAM 3375 MG: 3; .375 INJECTION, POWDER, LYOPHILIZED, FOR SOLUTION INTRAVENOUS at 21:00

## 2021-02-13 RX ADMIN — SODIUM CHLORIDE 1000 ML: 9 INJECTION, SOLUTION INTRAVENOUS at 19:15

## 2021-02-13 RX ADMIN — ONDANSETRON 4 MG: 2 INJECTION INTRAMUSCULAR; INTRAVENOUS at 19:15

## 2021-02-13 RX ADMIN — MORPHINE SULFATE 4 MG: 4 INJECTION, SOLUTION INTRAMUSCULAR; INTRAVENOUS at 19:15

## 2021-02-13 RX ADMIN — IOPAMIDOL 75 ML: 755 INJECTION, SOLUTION INTRAVENOUS at 20:08

## 2021-02-13 ASSESSMENT — ENCOUNTER SYMPTOMS
VOMITING: 0
NAUSEA: 0
ABDOMINAL PAIN: 0
CHEST TIGHTNESS: 0
SORE THROAT: 0
SHORTNESS OF BREATH: 0

## 2021-02-13 NOTE — ED NOTES
Pt currently taking cephlex 4 times daily with prednisone to treat skin infection to left leg. Pt states redness is extending further down leg and noticed newer reddened raised areas as recently as today.       Marciano Beasley RN  02/13/21 [id number]

## 2021-02-14 VITALS
SYSTOLIC BLOOD PRESSURE: 116 MMHG | WEIGHT: 222.66 LBS | HEART RATE: 69 BPM | OXYGEN SATURATION: 98 % | TEMPERATURE: 98.3 F | DIASTOLIC BLOOD PRESSURE: 72 MMHG | RESPIRATION RATE: 14 BRPM | BODY MASS INDEX: 34.87 KG/M2

## 2021-02-14 PROBLEM — L03.116 CELLULITIS AND ABSCESS OF LEFT LOWER EXTREMITY: Status: ACTIVE | Noted: 2021-02-14

## 2021-02-14 PROBLEM — L02.416 CELLULITIS AND ABSCESS OF LEFT LOWER EXTREMITY: Status: ACTIVE | Noted: 2021-02-14

## 2021-02-14 PROBLEM — L03.116 CELLULITIS OF LEFT LEG: Status: ACTIVE | Noted: 2021-02-14

## 2021-02-14 LAB
ANTISTREPTOLYSIN-O: 221 IU/ML (ref 0–200)
C-REACTIVE PROTEIN: 39.9 MG/L (ref 0–5.1)
SEDIMENTATION RATE, ERYTHROCYTE: 33 MM/HR (ref 0–20)

## 2021-02-14 PROCEDURE — 87081 CULTURE SCREEN ONLY: CPT

## 2021-02-14 PROCEDURE — G0378 HOSPITAL OBSERVATION PER HR: HCPCS

## 2021-02-14 PROCEDURE — 1200000000 HC SEMI PRIVATE

## 2021-02-14 PROCEDURE — 96372 THER/PROPH/DIAG INJ SC/IM: CPT

## 2021-02-14 PROCEDURE — 86140 C-REACTIVE PROTEIN: CPT

## 2021-02-14 PROCEDURE — 2580000003 HC RX 258: Performed by: STUDENT IN AN ORGANIZED HEALTH CARE EDUCATION/TRAINING PROGRAM

## 2021-02-14 PROCEDURE — 96366 THER/PROPH/DIAG IV INF ADDON: CPT

## 2021-02-14 PROCEDURE — 6360000002 HC RX W HCPCS: Performed by: STUDENT IN AN ORGANIZED HEALTH CARE EDUCATION/TRAINING PROGRAM

## 2021-02-14 PROCEDURE — 6370000000 HC RX 637 (ALT 250 FOR IP): Performed by: NURSE PRACTITIONER

## 2021-02-14 PROCEDURE — 86060 ANTISTREPTOLYSIN O TITER: CPT

## 2021-02-14 PROCEDURE — 94760 N-INVAS EAR/PLS OXIMETRY 1: CPT

## 2021-02-14 PROCEDURE — 6370000000 HC RX 637 (ALT 250 FOR IP): Performed by: STUDENT IN AN ORGANIZED HEALTH CARE EDUCATION/TRAINING PROGRAM

## 2021-02-14 PROCEDURE — 96367 TX/PROPH/DG ADDL SEQ IV INF: CPT

## 2021-02-14 PROCEDURE — 85652 RBC SED RATE AUTOMATED: CPT

## 2021-02-14 PROCEDURE — 36415 COLL VENOUS BLD VENIPUNCTURE: CPT

## 2021-02-14 RX ORDER — FAMOTIDINE 20 MG/1
20 TABLET, FILM COATED ORAL 2 TIMES DAILY
Status: DISCONTINUED | OUTPATIENT
Start: 2021-02-14 | End: 2021-02-14 | Stop reason: HOSPADM

## 2021-02-14 RX ORDER — POLYETHYLENE GLYCOL 3350 17 G/17G
17 POWDER, FOR SOLUTION ORAL DAILY PRN
Status: DISCONTINUED | OUTPATIENT
Start: 2021-02-14 | End: 2021-02-14 | Stop reason: HOSPADM

## 2021-02-14 RX ORDER — ACETAMINOPHEN 325 MG/1
650 TABLET ORAL EVERY 4 HOURS PRN
Status: DISCONTINUED | OUTPATIENT
Start: 2021-02-14 | End: 2021-02-14 | Stop reason: HOSPADM

## 2021-02-14 RX ORDER — DOXYCYCLINE HYCLATE 100 MG
100 TABLET ORAL 2 TIMES DAILY
Qty: 14 TABLET | Refills: 0 | Status: SHIPPED | OUTPATIENT
Start: 2021-02-14 | End: 2021-02-21

## 2021-02-14 RX ORDER — CEPHALEXIN 500 MG/1
500 CAPSULE ORAL 4 TIMES DAILY
Qty: 28 CAPSULE | Refills: 0 | Status: SHIPPED | OUTPATIENT
Start: 2021-02-14 | End: 2021-02-21

## 2021-02-14 RX ORDER — LACTOBACILLUS RHAMNOSUS GG 10B CELL
1 CAPSULE ORAL 2 TIMES DAILY WITH MEALS
Status: DISCONTINUED | OUTPATIENT
Start: 2021-02-14 | End: 2021-02-14 | Stop reason: HOSPADM

## 2021-02-14 RX ORDER — SODIUM CHLORIDE 0.9 % (FLUSH) 0.9 %
10 SYRINGE (ML) INJECTION PRN
Status: DISCONTINUED | OUTPATIENT
Start: 2021-02-14 | End: 2021-02-14 | Stop reason: HOSPADM

## 2021-02-14 RX ORDER — SODIUM CHLORIDE 0.9 % (FLUSH) 0.9 %
10 SYRINGE (ML) INJECTION EVERY 12 HOURS SCHEDULED
Status: DISCONTINUED | OUTPATIENT
Start: 2021-02-14 | End: 2021-02-14 | Stop reason: HOSPADM

## 2021-02-14 RX ORDER — ONDANSETRON 2 MG/ML
4 INJECTION INTRAMUSCULAR; INTRAVENOUS EVERY 6 HOURS PRN
Status: DISCONTINUED | OUTPATIENT
Start: 2021-02-14 | End: 2021-02-14 | Stop reason: HOSPADM

## 2021-02-14 RX ORDER — LACTOBACILLUS RHAMNOSUS GG 10B CELL
1 CAPSULE ORAL 2 TIMES DAILY WITH MEALS
Qty: 14 CAPSULE | Refills: 0 | Status: SHIPPED | OUTPATIENT
Start: 2021-02-14 | End: 2021-02-21

## 2021-02-14 RX ADMIN — CEFTRIAXONE 1000 MG: 1 INJECTION, POWDER, FOR SOLUTION INTRAMUSCULAR; INTRAVENOUS at 05:40

## 2021-02-14 RX ADMIN — VANCOMYCIN HYDROCHLORIDE 1250 MG: 10 INJECTION, POWDER, LYOPHILIZED, FOR SOLUTION INTRAVENOUS at 09:12

## 2021-02-14 RX ADMIN — FAMOTIDINE 20 MG: 20 TABLET, FILM COATED ORAL at 11:44

## 2021-02-14 RX ADMIN — Medication 1 CAPSULE: at 11:44

## 2021-02-14 RX ADMIN — ACETAMINOPHEN 650 MG: 325 TABLET ORAL at 00:24

## 2021-02-14 RX ADMIN — ENOXAPARIN SODIUM 40 MG: 40 INJECTION SUBCUTANEOUS at 09:12

## 2021-02-14 ASSESSMENT — PAIN SCALES - GENERAL
PAINLEVEL_OUTOF10: 0
PAINLEVEL_OUTOF10: 5
PAINLEVEL_OUTOF10: 0

## 2021-02-14 NOTE — PROGRESS NOTES
4 Eyes Admission Assessment     I agree as the admission nurse that 2 RN's have performed a thorough Head to Toe Skin Assessment on the patient. ALL assessment sites listed below have been assessed on admission.        Areas assessed by both nurses:   [x]   Head, Face, and Ears   [x]   Shoulders, Back, and Chest  [x]   Arms, Elbows, and Hands   [x]   Coccyx, Sacrum, and Ischum  [x]   Legs, Feet, and Heels        Does the Patient have Skin Breakdown?yes         Andrew Prevention initiated:  yes   Wound Care Orders initiated:no    [id number] [facility] nurse consulted for Pressure Injury (Stage 3,4, Unstageable, DTI, NWPT, and Complex wounds):no      Nurse 1 eSignature:Electronically signed by Gavin Buitrago RN on 2/14/2021 at 6:58 AM    SHARE this note so that the co-signing nurse is able to place an eSignature    Nurse 2 eSignature: Electronically signed by Dom Cifuentes RN on 2/14/21 at 7:01 AM EST

## 2021-02-14 NOTE — PLAN OF CARE
Problem: Pain:  Goal: Pain level will decrease  Description: Pain level will decrease  2/14/2021 0736 by Linda Knutson RN  Outcome: Ongoing  Note: Pt has had no c/o pain so far this shift. Will monitor and medicate as needed. 2/14/2021 0729 by Joline Cockayne, RN  Outcome: Ongoing  Note: Pt educated to attempt non-phagological method of pain control, but it it becomes too strong use PRN analgesics. Pain and discomfort being managed PRN analgesics per MD orders. Pt able to express presence of pain. Goal: Control of acute pain  Description: Control of acute pain  2/14/2021 0736 by Linda Knutson RN  Outcome: Ongoing  Note: Pt has had no c/o pain so far this shift. Will monitor and medicate as needed. 2/14/2021 0729 by Joline Cockayne, RN  Outcome: Ongoing  Note: Patient educated on acute pain. Taught patient to use call light to ask for pain medication. PRN pain medication given for acute pain. Will continue to monitor pain per unit protocol. Goal: Control of chronic pain  Description: Control of chronic pain  2/14/2021 0736 by Linda Knutson RN  Outcome: Ongoing  Note: Pt has had no c/o pain so far this shift. Will monitor and medicate as needed. 2/14/2021 0729 by Joline Cockayne, RN  Outcome: Ongoing  Note: Patient educated on chronic pain. Taught patient to use call light to ask for pain medication. PRN pain medication given for chronic pain. Will continue to monitor pain per unit protocol. Problem: Falls - Risk of:  Goal: Will remain free from falls  Description: Will remain free from falls  Outcome: Ongoing  Note: Pt free from injury or falls at this time, fall precautions in place, bed in low position, side rail up x2, Walker Fall Risk: Low (0-24), bed alarm not on as pt UAL, reoriented to room and call light, reminded to call if needing assistance, call light in reach, will continue to monitor. Pt verbalizes understanding of fall risk procedures.     Goal: Absence of physical injury  Description: Absence of physical injury  Outcome: Ongoing  Note: Pt has not incurred any type of physical injury this shift.

## 2021-02-14 NOTE — DISCHARGE SUMMARY
Hospital Medicine Discharge Summary      Patient ID: Ron Will      Patient's PCP: Lenin Hendrix MD    Admit Date: 2/13/2021     Discharge Date: 2/14/21    Admitting Physician: Bladimir Willingham DO     Discharge Provider: AMARILIS Nicholas - NP     Discharge Diagnoses: Active Hospital Problems    Diagnosis Date Noted    Cellulitis and abscess of left lower extremity [L03.116, L02.416] 02/14/2021    Cellulitis of left leg [L03.116] 02/14/2021    Iron deficiency anemia [D50.9] 02/02/2017    GERD (gastroesophageal reflux disease)--off ppi currently--s/p egd 6/16--wnl [K21.9] 08/03/2015    Anxiety--on ssri and seroquel with help--does not see psychiatrist currently [F41.9] 11/04/2014     Disposition:  [x] Home  [] Home with home health [] Rehab [] Psych [] SNF  [] LTAC  [] Long term nursing home or group home [] Transfer to ICU  [] Transfer to PCU [] Other:    Discharge Instructions/Follow-up:      Please call and schedule an appointment with your Primary Care Provider Dr. Lenin Hendrix MD at [phone] for a follow up visit within 1 week. - Continue to monitor LLE cellulitis. If improvement does not continue, consider ultrasound. Elevate LLE as possible    Compression dressing to LLE - Kerlix and ACE from foot to knee for swelling. Take medications as prescribed. - If you have questions about your medications and/or changes to your medications, please ask your hospital provider and/or your primary care provider. Return to the emergency room for fever, cough, chest pain or worsening symptoms.     PCP/SNF to follow up: As above    Discharge Condition: Stable      Code Status:  Full Code     Activity: activity as tolerated    Diet: DIET GENERAL;     Discharge Medications:     Current Discharge Medication List           Details   lactobacillus (CULTURELLE) capsule Take 1 capsule by mouth 2 times daily (with meals) for 7 days  Qty: 14 capsule, Refills: 0      cephALEXin (KEFLEX) 500 MG capsule Take 1 capsule by mouth 4 times daily for 7 days  Qty: 28 capsule, Refills: 0      doxycycline hyclate (VIBRA-TABS) 100 MG tablet Take 1 tablet by mouth 2 times daily for 7 days  Qty: 14 tablet, Refills: 0              Details   Multiple Vitamins-Minerals (MULTIVITAMIN ADULT PO) Take 1 tablet by mouth daily             The patient was seen and examined on day of discharge and this discharge summary is in conjunction with any daily progress note from day of discharge. Hospital Course: The patient is a 53 yrs old female, who  has a past medical history of Anxiety, Chronic back pain, Headache(784.0), Hx of blood clots, Obstructive apnea, and Seizures ([address] She presented to [facility] ER for evaluation following a 4 day hx of worsening redness and swelling fo her L lower posterior thigh. She felt that this may have started as a bug bite - with a notable red round lesion to the area, though no notable bite marks. This progressed and she was seen at an urgent care the date prior to presentation, where she was prescribed kefflex and prednisone. She felt that these did help some. However, she developed a new red/swollen/painful lesion behind her knee. She felt that this may have been draining though was not completely sure and found no evidence of drainage. In the ER, CT showed no drainable fluid collection. The patient was noted to have edema to the area - consistent with cellulitis. She was admitted for further workup and treatment. IV rocephin and vancomycin were initiated. The patient noted significant improvement the date following her admission and on 2/14/21, was set on going home if at all possible. Compression wrap from L foot to knee was ordered - to be applied prior to discharge. She verbalized understanding that she should seek further medical care if improvement stalls and she is aware that this may include US for further evaluation of the cellulitic area.  At this time, the patient is able to discharge to home in stable condition with OP f/u as above. Exam:     /78   Pulse 65   Temp 97.4 °F (36.3 °C) (Oral)   Resp 14   Wt 222 lb 10.6 oz (101 kg)   LMP 11/02/2016   SpO2 99%   BMI 34.87 kg/m²     General: Alert and oriented. Sitting up at bedside in NAD. Pleasant and cooperative. Obese. HEENT: Normocephalic. Atraumatic. Pupils equal and reactive. EOM intact. Oral mucosa pink/moist/intact. Neck: Supple. Symmetrical. Trachea midline. Lungs: Clear to auscultation bilaterally. Respirations even and unlabored. Chest: Exam unremarkable. Cardiac: S1/S2 noted. Regular Rhythm and rate. Abdomen/GI: Soft. Non-tender. Non-distended. BS+. Extremities: PP+. Atraumatic. Brisk cap refill. LLE with nonpitting edema. Redness noted to L lower, medial thigh and posterior/medial knee. Reddened area is outlined with a surgical pen and does appear to be improving. No lesions noted. Skin: Dry and intact. No lesions noted, except as above. Neuro: Grossly intact. No focal deficits noted. Consults:     PHARMACY TO DOSE VANCOMYCIN    Significant Diagnostic Studies:       CT FEMUR LEFT W CONTRAST   Final Result   1. Subcutaneous edema predominant involving the medial soft tissues of the   thigh and dorsal soft tissues of the lower leg. Correlate clinically for   cellulitis. No organized drainable fluid collection or subcutaneous gas   identified. 2. Left inguinal lymphadenopathy, likely reactive. 3. No CT evidence of osteomyelitis. No acute osseous abnormality. US SOFT TISSUE LIMITED AREA    (Results Pending)         Labs:  For convenience and continuity at follow-up the following most recent labs are provided:    CBC:    Lab Results   Component Value Date    WBC 10.9 02/13/2021    HGB 13.3 02/13/2021    HCT 40.8 02/13/2021     02/13/2021       Renal:    Lab Results   Component Value Date     02/13/2021    K 4.3 02/13/2021     02/13/2021    CO2 24 02/13/2021    BUN 30 02/13/2021    CREATININE 0.6 02/13/2021    CALCIUM 9.3 02/13/2021       Future Appointments   Date Time Provider Jay Majano   2/25/2021  2:15 PM Jimy Edwards MD [facility]       Time Spent on discharge is more than 45 minutes in the examination, evaluation, counseling and review of medications and discharge plan. RX monitoring reviewed N/A    Signed:    AMARILIS Stevens - NP   2/14/2021      Thank you Godfrey Ashton MD for the opportunity to be involved in this patient's care. If you have any questions or concerns please feel free to contact me at [phone].

## 2021-02-14 NOTE — PLAN OF CARE
Problem: Pain:  Goal: Pain level will decrease  Description: Pain level will decrease  Outcome: Ongoing  Note: Pt educated to attempt non-phagological method of pain control, but it it becomes too strong use PRN analgesics. Pain and discomfort being managed PRN analgesics per MD orders. Pt able to express presence of pain. Problem: Pain:  Goal: Control of acute pain  Description: Control of acute pain  Outcome: Ongoing  Note: Patient educated on acute pain. Taught patient to use call light to ask for pain medication. PRN pain medication given for acute pain. Will continue to monitor pain per unit protocol. Problem: Pain:  Goal: Control of chronic pain  Description: Control of chronic pain  Outcome: Ongoing  Note: Patient educated on chronic pain. Taught patient to use call light to ask for pain medication. PRN pain medication given for chronic pain. Will continue to monitor pain per unit protocol.

## 2021-02-14 NOTE — ED PROVIDER NOTES
[address] [facility]        Pt Name: Tani Ennis  MRN: [id number]  [place] 1970  Date of evaluation: 2/13/2021  Provider: AMARILIS Villarreal - FRANCISCA  PCP: Genaro Barksdale MD    GABRIEL. I have evaluated this patient. My supervising physician was available for consultation. CHIEF COMPLAINT       Chief Complaint   Patient presents with    Skin Problem     left inner thigh since thursday states now redness noted into the groin and down towards past her knee       HISTORY OF PRESENT ILLNESS   (Location, Timing/Onset, Context/Setting, Quality, Duration, Modifying Factors, Severity, Associated Signs and Symptoms)  Note limiting factors. Tani Ennis is a 53 y.o. female to the emergency department today for evaluation of pain, swelling, and redness noted to the left lower extremity. Patient states that symptoms began 2 days ago. Patient states that she was sleeping and felt a sharp \"pinch-like\" pains to the medial aspect of her left mid thigh. She states she felt as if something may have \"bit\" her. Patient states that since then she has developed progressive pain, redness, and swelling to the area. She has never had anything like this in the past.  Patient states that she was seen at a local urgent care yesterday and was placed on Keflex and prednisone. Patient states symptoms have become much worse despite intervention. There has been no recent travel or known sick contacts. There is no history of DVTs or PEs. She denies recent mobilization or surgeries. She denies cough or hemoptysis. Patient currently reports a pain level of 7 out of 10. She describes pain as constant dull and burning with sharp pains that occur when touching the affected area. She has taken OTC Tylenol and ibuprofen with little relief of her symptoms. She reports mild nausea and subjective fever.     Nursing Notes were all reviewed and agreed with or any disagreements were addressed in the HPI. REVIEW OF SYSTEMS    (2-9 systems for level 4, 10 or more for level 5)     Review of Systems   Constitutional: Negative for chills and fever. HENT: Negative for congestion and sore throat. Respiratory: Negative for chest tightness and shortness of breath. Cardiovascular: Negative for chest pain and palpitations. Gastrointestinal: Negative for abdominal pain, nausea and vomiting. Endocrine: Negative for polydipsia and polyuria. Genitourinary: Negative for dysuria and flank pain. Musculoskeletal: Positive for myalgias. Negative for neck pain and neck stiffness. Skin: Negative for rash and wound. Redness and edema to left lower extremity   Allergic/Immunologic: Negative for immunocompromised state. Neurological: Negative for dizziness, weakness and headaches. Psychiatric/Behavioral: Negative for suicidal ideas. Positives and Pertinent negatives as per HPI. Except as noted above in the ROS, all other systems were reviewed and negative.        PAST MEDICAL HISTORY     Past Medical History:   Diagnosis Date    Anxiety     Chronic back pain     Headache(784.0)     Hx of blood clots     march 2016-was on xarelto for 6months    Obstructive apnea     Seizures ([facility] [address]     states no longer  has them-last one was in 2013-june         SURGICAL HISTORY     Past Surgical History:   Procedure Laterality Date    EPIDURAL STEROID INJECTION N/A 9/10/2019    MIDLINE LUMBAR FIVE SACRAL ONE EPIDURAL STEROID INJECTION SITE CONFIRMED BY FLUOROSCOPY performed by Mario Moise MD at [address] Right 2003    RIGHT KNEE    KNEE SURGERY Left 2012    LASIK Bilateral 2004    PARTIAL HYSTERECTOMY  11.4.16    Dr. Theresa Martinez       Previous Medications    MULTIPLE VITAMINS-MINERALS (MULTIVITAMIN ADULT PO)    Take 1 tablet by mouth daily         ALLERGIES     Latex, Bee pollen, and Lime flavor [flavoring agent]    FAMILYHISTORY       Family History   Problem Relation Age of Onset    Heart Disease Father     Other Father         LILIAN    Diabetes Father     Alcohol Abuse Father     Obesity Father     Alzheimer's Disease Other         Grandparents    Breast Cancer Other         Aunt    Lung Cancer Other         Grandma    Colon Cancer Other         Grandpa    Diabetes Other         Grandma    Seizures Sister         Patricia Hernandez, son          SOCIAL HISTORY       Social History     Tobacco Use    Smoking status: Former Smoker     Years: 1.00     Types: Cigarettes     Quit date: 1987     Years since quittin.2    Smokeless tobacco: Never Used    Tobacco comment: counseled on tobacco exposure avoidance   Substance Use Topics    Alcohol use: No     Alcohol/week: 0.0 standard drinks     Frequency: Never     Binge frequency: Never    Drug use: No       SCREENINGS             PHYSICAL EXAM    (up to 7 for level 4, 8 or more for level 5)     ED Triage Vitals [21 1658]   BP Temp Temp Source Pulse Resp SpO2 Height Weight   (!) 143/92 98.6 °F (37 °C) Temporal 92 15 97 % -- 222 lb 7.1 oz (100.9 kg)       Physical Exam  Vitals signs and nursing note reviewed. Constitutional:       Appearance: Normal appearance. HENT:      Head: Normocephalic and atraumatic. Right Ear: External ear normal.      Left Ear: External ear normal.      Nose: Nose normal.      Mouth/Throat:      Mouth: Mucous membranes are moist.   Eyes:      General:         Right eye: No discharge. Left eye: No discharge. Neck:      Musculoskeletal: Normal range of motion and neck supple. Pulmonary:      Effort: Pulmonary effort is normal. No respiratory distress. Abdominal:      Palpations: Abdomen is soft. Tenderness: There is no abdominal tenderness. Musculoskeletal:      Left upper leg: She exhibits tenderness and swelling. She exhibits no bony tenderness, no deformity and no laceration. Left lower leg: She exhibits tenderness and swelling. She exhibits no bony tenderness, no deformity and no laceration. Legs:    Skin:     General: Skin is warm and dry. Capillary Refill: Capillary refill takes less than 2 seconds. Neurological:      General: No focal deficit present. Mental Status: She is alert and oriented to person, place, and time. Psychiatric:         Mood and Affect: Mood normal.         Behavior: Behavior normal.             DIAGNOSTIC RESULTS   LABS:    Labs Reviewed   CBC WITH AUTO DIFFERENTIAL - Abnormal; Notable for the following components:       Result Value    Neutrophils Absolute 9.3 (*)     All other components within normal limits    Narrative:     Performed at:  [address] [address]   Phone [phone]   COMPREHENSIVE METABOLIC PANEL W/ REFLEX TO MG FOR LOW K - Abnormal; Notable for the following components:    Glucose 117 (*)     BUN 30 (*)     Albumin/Globulin Ratio 1.0 (*)     All other components within normal limits    Narrative:     Performed at:  [address] [address]   Phone [phone]   CULTURE, BLOOD 1   CULTURE, BLOOD 2   URINE RT REFLEX TO CULTURE    Narrative:     Performed at:  [address] [address]   Phone [phone]       All other labs were within normal range or not returned as of this dictation. EKG: All EKG's are interpreted by the Emergency Department Physician in the absence of a cardiologist.  Please see their note for interpretation of EKG.       RADIOLOGY:   Non-plain film images such as CT, Ultrasound and MRI are read by the radiologist. Plain radiographic images are visualized and preliminarily interpreted by the ED Provider with the below findings:        Interpretation per the Radiologist below, if available at the time of this note:    CT FEMUR LEFT W CONTRAST   Final Result   1. Subcutaneous edema predominant involving the medial soft tissues of the   thigh and dorsal soft tissues of the lower leg. Correlate clinically for   cellulitis. No organized drainable fluid collection or subcutaneous gas   identified. 2. Left inguinal lymphadenopathy, likely reactive. 3. No CT evidence of osteomyelitis. No acute osseous abnormality. No results found. PROCEDURES   Unless otherwise noted below, none     Procedures    CRITICAL CARE TIME   N/A    CONSULTS:  None      EMERGENCY DEPARTMENT COURSE and DIFFERENTIAL DIAGNOSIS/MDM:   Vitals:    Vitals:    02/13/21 1658 02/13/21 2102 02/13/21 2319   BP: (!) 143/92 101/67 99/64   Pulse: 92 84 79   Resp: 15 18 18   Temp: 98.6 °F (37 °C)     TempSrc: Temporal     SpO2: 97% 94% 96%   Weight: 222 lb 7.1 oz (100.9 kg)         Patient was given the following medications:  Medications   vancomycin (VANCOCIN) 1500 mg in dextrose 5 % 250 mL IVPB (1,500 mg Intravenous New Bag 2/13/21 2211)   0.9 % sodium chloride bolus (0 mLs Intravenous Stopped 2/13/21 2144)   ondansetron (ZOFRAN) injection 4 mg (4 mg Intravenous Given 2/13/21 1915)   morphine (PF) injection 4 mg (4 mg Intravenous Given 2/13/21 1915)   piperacillin-tazobactam (ZOSYN) 3,375 mg in dextrose 5 % 50 mL IVPB (mini-bag) (0 mg Intravenous Stopped 2/13/21 2144)   iopamidol (ISOVUE-370) 76 % injection 75 mL (75 mLs Intravenous Given 2/13/21 2008)       Nursing notes reviewed. This is a 81-year-old  female who presents for evaluation of left lower leg pain, swelling, and redness. Symptoms began 2 days ago. Patient states that she was concerned that she may have been bitten by an insect of some sort. She was placed on Keflex and prednisone yesterday by the local urgent care. She states that symptoms have progressed despite intervention. Physical exam complete. Patient is nontoxic, afebrile, normotensive.   She appears uncomfortable. Laboratory studies have been unremarkable. CT of the lower extremity interpreted by radiologist as negative for discrete abscess but does show subcutaneous skin changes consistent with cellulitis. Patient started on IV vancomycin and IV Zosyn here in the ED. At this time there is no evidence of any life-threatening or emergent conditions requiring immediate intervention. Patient has remained hemodynamically stable throughout ED visit. Work-up reveals left leg cellulitis. She will benefit from admission for further evaluation and treatment. She is in agreement with this plan of care. Patient admitted under hospitalist service    FINAL IMPRESSION      1. Left leg cellulitis          DISPOSITION/PLAN   DISPOSITION Decision To Admit 02/13/2021 11:00:02 PM      PATIENT REFERREDTO:  No follow-up provider specified.     DISCHARGE MEDICATIONS:  New Prescriptions    No medications on file       DISCONTINUED MEDICATIONS:  Discontinued Medications    No medications on file              (Please note that portions of this note were completed with a voice recognition program.  Efforts were made to edit the dictations but occasionally words are mis-transcribed.)    AMARILIS Izaguirre CNP (electronically signed)           AMARILIS Izaguirre CNP  02/13/21 990 [facility], APRN - CNP  02/13/21 [id number]

## 2021-02-14 NOTE — ED NOTES
Damion Ríosix -- [phone]    Patient states ok to provide patient updates to her son.      Devi Kwan RN  02/14/21 [id number]

## 2021-02-14 NOTE — PROGRESS NOTES
Data- discharge order received, pt verbalized agreement to discharge, disposition to previous residence, no needs for HHC/DME. Action- discharge instructions prepared and given to patient, pt verbalized understanding. Medication information packet given r/t NEW and/or CHANGED prescriptions emphasizing name/purpose/side effects, pt verbalized understanding. Discharge instruction summary: Diet- general, Activity- UAL, Primary Care Physician as follows: Patrica Vance -[phone] f/u appointment in one week, immunizations reviewed and are up to date, prescription medications filled by [facility]. Response- Pt belongings gathered, IV removed. Disposition is home (no HHC/DME needs), pt walked independently from unit with belongings, no complications.

## 2021-02-14 NOTE — H&P
Hospital Medicine History & Physical      PCP: Heidy Dwyer MD    Date of Admission: 2/13/2021    Date of Service: Pt seen/examined on 2/13/2021 and Admitted to Inpatient     Chief Complaint: Pain, erythema, focal swelling to the left lower extremity to the posterior leg      History Of Present Illness: The patient is a 53 y.o. female with past medical history of noted one-time occurrence of provoked blood clots while on birth control for which she resolved already, sleep apnea, very remote previous history of seizures, and anxiety who presents to [facility] with concern for worsening redness and focal areas of swelling as well as advancing redness up the left posterior inferior inner extremity of the inner thigh which started around Thursday, 4 days ago, which she is uncertain as to the origin of at this time. She feels like it may have been a bug bite since there was notable circumferential red lesion but then had surrounding erythema and advancing up the leg but there are no bite marks according to what she has been told. She went to an urgent care around Friday when the redness was worsening and when the pain became more notable. She was prescribed Keflex and prednisone, the prednisone was felt to help with any inflammation from a potential bug bite. She notes that the antibiotic and the prednisone were only taken for a few days but effectively the redness and the pain and swelling especially the area behind her knee had developed a new lesion and more pain, she felt like maybe the lesion behind her knee was potentially draining a fluid but does not look like that at this time.   She denies any other symptoms of fever, chills, chest pain, shortness of breath, dizziness, syncope, lack of appetite, nausea/vomiting/diarrhea/abdominal pain, blood in urine/stool/sputum. She is only had one DVT in her life and currently is not on any anticoagulation. Past Medical History:        Diagnosis Date    Anxiety     Chronic back pain     Headache(784.0)     Hx of blood clots     march 2016-was on xarelto for 6months    Obstructive apnea     Seizures ([facility] [address]     states no longer  has them-last one was in 2013-june       Past Surgical History:        Procedure Laterality Date    EPIDURAL STEROID INJECTION N/A 9/10/2019    MIDLINE LUMBAR FIVE SACRAL ONE EPIDURAL STEROID INJECTION SITE CONFIRMED BY FLUOROSCOPY performed by Chana Mcfarland MD at [address] Right 2003    RIGHT KNEE    KNEE SURGERY Left 2012    LASIK Bilateral 2004    PARTIAL HYSTERECTOMY  11.4.16    Dr. Ana Peña         Medications Prior to Admission:    Prior to Admission medications    Medication Sig Start Date End Date Taking? Authorizing Provider   Multiple Vitamins-Minerals (MULTIVITAMIN ADULT PO) Take 1 tablet by mouth daily   Yes Historical Provider, MD       Allergies:  Latex, Bee pollen, and Lime flavor [flavoring agent]    Social History:  The patient currently lives home    TOBACCO:   reports that she quit smoking about 33 years ago. Her smoking use included cigarettes. She quit after 1.00 year of use. She has never used smokeless tobacco.  ETOH:   reports no history of alcohol use. Family History:  Reviewed in detail and negative for DM, Early CAD, Cancer, CVA. Positive as follows:        Problem Relation Age of Onset    Heart Disease Father     Other Father         LILIAN    Diabetes Father     Alcohol Abuse Father     Obesity Father     Alzheimer's Disease Other         Grandparents    Breast Cancer Other         Aunt    Lung Cancer Other         Grandma    Colon Cancer Other         Grandpa    Diabetes Other         Grandma    Seizures Sister         Uncle, son       REVIEW OF SYSTEMS:    as noted in the HPI.  All other systems reviewed and negative. PHYSICAL EXAM:    /73   Pulse 63   Temp 97.3 °F (36.3 °C) (Oral)   Resp 16   Wt 222 lb 10.6 oz (101 kg)   LMP 11/02/2016   SpO2 95%   BMI 34.87 kg/m²     General appearance: No apparent distress appears stated age and cooperative. HEENT Normal cephalic, atraumatic without obvious deformity. Pupils equal, round, and reactive to light. Extra ocular muscles intact. Conjunctivae/corneas clear. Neck: Supple, No jugular venous distention/bruits. Trachea midline without thyromegaly or adenopathy with full range of motion. Lungs: Clear to auscultation, bilaterally without Rales/Wheezes/Rhonchi with good respiratory effort. Heart: Regular rate and rhythm with Normal S1/S2 without murmurs, rubs or gallops, point of maximum impulse non-displaced  Abdomen: Soft, non-tender or non-distended without rigidity or guarding and positive bowel sounds all four quadrants. Extremities: Bilaterally the lower extremities seem to be symmetric in terms of overall lack of edema  Skin: Right inner thigh is completely normal on presentation. Left leg inner thigh more to the medial posterior area notes multiple very erythematous circumscribed lesions that look to have a central area of redness but no specific noted bite marks, areas of erythema that are very broad and seem to follow a pattern from the back of the knee upwards towards the juncture of the groin just below the inguinal crease but seem to be in a very circumscribed area. I outlined the area with a marker to monitor for progression  Neurologic: Alert and oriented X 3, neurovascularly intact with sensory/motor intact upper extremities/lower extremities, bilaterally. Cranial nerves: II-XII intact, grossly non-focal.  Mental status: Alert, oriented, thought content appropriate.   Capillary Refill: Acceptable  < 3 seconds  Peripheral Pulses: +3 Easily felt, not easily obliterated with pressure    CT femur left with IV contrast:  1. Subcutaneous edema predominant involving the medial soft tissues of the   thigh and dorsal soft tissues of the lower leg.  Correlate clinically for   cellulitis.  No organized drainable fluid collection or subcutaneous gas   identified. 2. Left inguinal lymphadenopathy, likely reactive. 3. No CT evidence of osteomyelitis.  No acute osseous abnormality. CBC   Recent Labs     02/13/21 1919   WBC 10.9   HGB 13.3   HCT 40.8         RENAL  Recent Labs     02/13/21 1919      K 4.3      CO2 24   BUN 30*   CREATININE 0.6     LFT'S  Recent Labs     02/13/21 1919   AST 18   ALT 21   BILITOT 0.4   ALKPHOS 104     COAG  No results for input(s): INR in the last 72 hours. CARDIAC ENZYMES  No results for input(s): CKTOTAL, CKMB, CKMBINDEX, TROPONINI in the last 72 hours.     U/A:    Lab Results   Component Value Date    COLORU YELLOW 02/13/2021    WBCUA 223 10/06/2016    RBCUA  10/06/2016    MUCUS 4+ 11/01/2014    BACTERIA 1+ 10/06/2016    CLARITYU Clear 02/13/2021    SPECGRAV >1.030 02/13/2021    LEUKOCYTESUR Negative 02/13/2021    BLOODU Negative 02/13/2021    GLUCOSEU Negative 02/13/2021       ABG  No results found for: JUI8HUT, BEART, P3ZEZVKU, PHART, THGBART, KTV5UWN, PO2ART, NBZ0BDP        Active Hospital Problems    Diagnosis Date Noted    Cellulitis and abscess of left lower extremity [L03.116, L02.416] 02/14/2021     Priority: High    Cellulitis of left leg [L03.116] 02/14/2021    Iron deficiency anemia [D50.9] 02/02/2017    GERD (gastroesophageal reflux disease)--off ppi currently--s/p egd 6/16--wnl [K21.9] 08/03/2015    Anxiety--on ssri and seroquel with help--does not see psychiatrist currently [F41.9] 11/04/2014         PHYSICIANS CERTIFICATION:    I certify that Asha Garsia is expected to be hospitalized for greater than 2 midnights based on the following assessment and plan:      ASSESSMENT/PLAN:  · At this time do not feel like there is a current concern for DVT  · We will obtain ultrasound to further evaluate the area of erythema to further rule out any concerning finding of abscess  · Started patient on vancomycin and Rocephin, potentially patient just needs vancomycin if this is just a MRSA cellulitis since it was not responsive to Keflex  · We will not resume the steroids at this time, feel like it could be worsening the infection especially if this is more of a cellulitis and not a bug bite induced cellulitis    DVT Prophylaxis: Lovenox  Diet: DIET GENERAL;  Code Status: Full Code  PT/OT Eval Status: Ambulatory at baseline    Dispo -pending clinical course       Cedrick Edin Standard, DO    Thank you Perry Parker MD for the opportunity to be involved in this patient's care. If you have any questions or concerns please feel free to contact me at [phone].

## 2021-02-14 NOTE — CARE COORDINATION
INITIAL CASE MANAGEMENT ASSESSMENT    Reviewed chart, met with patient to assess possible discharge needs. Explained Case Management role/services. SW interviewed patient via telephone today. Living Situation: Patient reports that she resides in a single family home with her mother, sister, grandmother and one cat. There is one stair leading up to the front door and prior to medical admission she has no trouble getting in or out of the property. ADLs: Prior to medical admission patient reports that she was independent. She stated that she doesn't anticipate any needs at discharge. DME: Prior to medical admission patient reports that she was independent. She stated that she doesn't anticipate any needs at discharge. PT/OT Recs: Since this morning patient reports that she has been ambulating without assistance. She stated that she doesn't anticipate any needs at discharge. Active Services: Prior to medical admission patient reports that she had no active services. She stated that she doesn't anticipate any needs at discharge. Transportation: Prior to medical admission patient reports that she was an active . She stated that her sister was at bedside and will likely transport her home at discharge. Medications: Patient receives medications from [facility]. At this time she reports no issues with access or affordability. PCP: Genaro Barksdale -[phone] (phone) and [phone] (fax number). She stated that she can't remember her last appointment with this provider, however, [facility] [address] states that it was in September of 2017. PLAN/COMMENTS:   1) Discharge to home with family. Patient doesn't appear to have any psychosocial needs at this time that we can address. She is being discharged to home with PO meds. SW provided contact information for patient or family to call with any questions. SW will follow and assist as needed.      Respectfully Yuliana adams

## 2021-02-14 NOTE — PROGRESS NOTES
LLE wrapped from ankle to thigh with Kerlix and Ace wrap, as ordered. Supplies given to pt so she can wrap leg at home.

## 2021-02-15 ENCOUNTER — TELEPHONE (OUTPATIENT)
Dept: FAMILY MEDICINE CLINIC | Age: 51
End: 2021-02-15

## 2021-02-15 NOTE — TELEPHONE ENCOUNTER
Call pt,  How is she doing? Would like her to sched a hospital fu visit this week.   Prefer in person and not virtual.

## 2021-02-15 NOTE — TELEPHONE ENCOUNTER
[phone] (home)   Called pt and she says that the bumps have gone down a lot. Leg is wrapped up. Discomfort has gotten a lot better.

## 2021-02-16 LAB — MRSA CULTURE ONLY: NORMAL

## 2021-02-17 ENCOUNTER — TELEPHONE (OUTPATIENT)
Dept: FAMILY MEDICINE CLINIC | Age: 51
End: 2021-02-17

## 2021-02-17 LAB
BLOOD CULTURE, ROUTINE: NORMAL
CULTURE, BLOOD 2: NORMAL

## 2021-02-18 ENCOUNTER — HOSPITAL ENCOUNTER (EMERGENCY)
Age: 51
Discharge: HOME | End: 2021-02-18
Attending: EMERGENCY MEDICINE
Payer: MEDICAID

## 2021-02-18 ENCOUNTER — TELEPHONE (OUTPATIENT)
Dept: FAMILY MEDICINE CLINIC | Age: 51
End: 2021-02-18

## 2021-02-18 VITALS
SYSTOLIC BLOOD PRESSURE: 138 MMHG | RESPIRATION RATE: 14 BRPM | HEART RATE: 69 BPM | DIASTOLIC BLOOD PRESSURE: 88 MMHG | TEMPERATURE: 97.3 F | OXYGEN SATURATION: 96 %

## 2021-02-18 DIAGNOSIS — L03.116 CELLULITIS AND ABSCESS OF LEFT LOWER EXTREMITY: Primary | ICD-10-CM

## 2021-02-18 DIAGNOSIS — L02.416 CELLULITIS AND ABSCESS OF LEFT LOWER EXTREMITY: Primary | ICD-10-CM

## 2021-02-18 DIAGNOSIS — L03.116 CELLULITIS OF LEFT LOWER EXTREMITY: ICD-10-CM

## 2021-02-18 LAB
A/G RATIO: 0.9 (ref 1.1–2.2)
ALBUMIN SERPL-MCNC: 3.6 G/DL (ref 3.4–5)
ALP BLD-CCNC: 99 U/L (ref 40–129)
ALT SERPL-CCNC: 24 U/L (ref 10–40)
ANION GAP SERPL CALCULATED.3IONS-SCNC: 10 MMOL/L (ref 3–16)
AST SERPL-CCNC: 22 U/L (ref 15–37)
BASOPHILS ABSOLUTE: 0 K/UL (ref 0–0.2)
BASOPHILS RELATIVE PERCENT: 0.4 %
BILIRUB SERPL-MCNC: 0.4 MG/DL (ref 0–1)
BUN BLDV-MCNC: 24 MG/DL (ref 7–20)
CALCIUM SERPL-MCNC: 9.3 MG/DL (ref 8.3–10.6)
CHLORIDE BLD-SCNC: 103 MMOL/L (ref 99–110)
CO2: 25 MMOL/L (ref 21–32)
CREAT SERPL-MCNC: 0.6 MG/DL (ref 0.6–1.1)
EOSINOPHILS ABSOLUTE: 0.3 K/UL (ref 0–0.6)
EOSINOPHILS RELATIVE PERCENT: 3.2 %
GFR AFRICAN AMERICAN: >60
GFR NON-AFRICAN AMERICAN: >60
GLOBULIN: 3.9 G/DL
GLUCOSE BLD-MCNC: 92 MG/DL (ref 70–99)
HCT VFR BLD CALC: 39.4 % (ref 36–48)
HEMOGLOBIN: 12.9 G/DL (ref 12–16)
LYMPHOCYTES ABSOLUTE: 2.4 K/UL (ref 1–5.1)
LYMPHOCYTES RELATIVE PERCENT: 26.8 %
MCH RBC QN AUTO: 28.3 PG (ref 26–34)
MCHC RBC AUTO-ENTMCNC: 32.7 G/DL (ref 31–36)
MCV RBC AUTO: 86.4 FL (ref 80–100)
MONOCYTES ABSOLUTE: 0.7 K/UL (ref 0–1.3)
MONOCYTES RELATIVE PERCENT: 7.3 %
NEUTROPHILS ABSOLUTE: 5.6 K/UL (ref 1.7–7.7)
NEUTROPHILS RELATIVE PERCENT: 62.3 %
PDW BLD-RTO: 14.6 % (ref 12.4–15.4)
PLATELET # BLD: 245 K/UL (ref 135–450)
PMV BLD AUTO: 9.9 FL (ref 5–10.5)
POTASSIUM REFLEX MAGNESIUM: 4.1 MMOL/L (ref 3.5–5.1)
RBC # BLD: 4.56 M/UL (ref 4–5.2)
SODIUM BLD-SCNC: 138 MMOL/L (ref 136–145)
TOTAL PROTEIN: 7.5 G/DL (ref 6.4–8.2)
WBC # BLD: 9 K/UL (ref 4–11)

## 2021-02-18 PROCEDURE — 80053 COMPREHEN METABOLIC PANEL: CPT

## 2021-02-18 PROCEDURE — 6370000000 HC RX 637 (ALT 250 FOR IP): Performed by: EMERGENCY MEDICINE

## 2021-02-18 PROCEDURE — 85025 COMPLETE CBC W/AUTO DIFF WBC: CPT

## 2021-02-18 PROCEDURE — 99284 EMERGENCY DEPT VISIT MOD MDM: CPT

## 2021-02-18 RX ORDER — HYDROCODONE BITARTRATE AND ACETAMINOPHEN 5; 325 MG/1; MG/1
1 TABLET ORAL ONCE
Status: COMPLETED | OUTPATIENT
Start: 2021-02-18 | End: 2021-02-18

## 2021-02-18 RX ORDER — IBUPROFEN 400 MG/1
400 TABLET ORAL ONCE
Status: COMPLETED | OUTPATIENT
Start: 2021-02-18 | End: 2021-02-18

## 2021-02-18 RX ORDER — HYDROCODONE BITARTRATE AND ACETAMINOPHEN 5; 325 MG/1; MG/1
1 TABLET ORAL EVERY 6 HOURS PRN
Qty: 12 TABLET | Refills: 0 | Status: SHIPPED | OUTPATIENT
Start: 2021-02-18 | End: 2021-02-21

## 2021-02-18 RX ORDER — IBUPROFEN 600 MG/1
600 TABLET ORAL EVERY 6 HOURS PRN
Qty: 20 TABLET | Refills: 0 | Status: SHIPPED | OUTPATIENT
Start: 2021-02-18 | End: 2021-05-03

## 2021-02-18 RX ADMIN — HYDROCODONE BITARTRATE AND ACETAMINOPHEN 1 TABLET: 5; 325 TABLET ORAL at 15:44

## 2021-02-18 RX ADMIN — IBUPROFEN 400 MG: 400 TABLET, FILM COATED ORAL at 15:44

## 2021-02-18 ASSESSMENT — ENCOUNTER SYMPTOMS
ABDOMINAL PAIN: 0
CHOKING: 0
FACIAL SWELLING: 0
COUGH: 0
CHEST TIGHTNESS: 0
STRIDOR: 0
NAUSEA: 0
BACK PAIN: 0
DIARRHEA: 0
EYE PAIN: 0
VOMITING: 0
PHOTOPHOBIA: 0
COLOR CHANGE: 1
CONSTIPATION: 0
TROUBLE SWALLOWING: 0
SHORTNESS OF BREATH: 0
WHEEZING: 0

## 2021-02-18 ASSESSMENT — PAIN - FUNCTIONAL ASSESSMENT
PAIN_FUNCTIONAL_ASSESSMENT: PREVENTS OR INTERFERES SOME ACTIVE ACTIVITIES AND ADLS
PAIN_FUNCTIONAL_ASSESSMENT: PREVENTS OR INTERFERES SOME ACTIVE ACTIVITIES AND ADLS

## 2021-02-18 ASSESSMENT — PAIN DESCRIPTION - LOCATION: LOCATION: LEG

## 2021-02-18 ASSESSMENT — PAIN DESCRIPTION - PROGRESSION: CLINICAL_PROGRESSION: GRADUALLY IMPROVING

## 2021-02-18 ASSESSMENT — PAIN SCALES - GENERAL: PAINLEVEL_OUTOF10: 3

## 2021-02-18 ASSESSMENT — PAIN DESCRIPTION - PAIN TYPE: TYPE: ACUTE PAIN

## 2021-02-18 ASSESSMENT — PAIN DESCRIPTION - ORIENTATION: ORIENTATION: LEFT

## 2021-02-18 ASSESSMENT — PAIN DESCRIPTION - DESCRIPTORS: DESCRIPTORS: DISCOMFORT;SQUEEZING

## 2021-02-18 NOTE — ED PROVIDER NOTES
eMERGENCY dEPARTMENT eNCOUnter      Pt Name: Chilo Orozco  MRN: [id number]  [place] 1970  Date of evaluation: 2/18/2021  Provider: Sammie Slater MD     [address] [place] [address]       Chief Complaint   Patient presents with    Cellulitis     d/c on sunday evening from here . pt states pain worsened starting on left leg          HISTORY OF PRESENT ILLNESS   (Location/Symptom, Timing/Onset,Context/Setting, Quality, Duration, Modifying Factors, Severity) Note limiting factors. Chilo Orozco is a 53 y.o. female who presents to the emergency department for left leg pain. She had called her PCP who sent her over to the ED b/c of concerns for DVT 2/2 a history of having them in the past.  Today she denies CP, SOB, MACHADO, unilateral leg swelling, smoking, long road trips, OCP use. She has an area of tenderness behind the right knee but says overall her LLE is less swollen and less painful. The limb is warmer to the touch than the RLE. There is an old skin marking which shows the extent of the prior cellulitis. The erythema is significantly improved from that marking. The patient is concerned that she had a LLE doppler ordered but could not find the outpatient order and would like it done today. Has not taken anything for the pain today. Works at [employer] as . Pain is worse when she is walking. The leg was wrapped in kurlex and ACE bandage. Good sensation, motor and pulses distally. No drainage noted on kurlex. REVIEW OFSYSTEMS    (2+ for level 4; 10+ for level 5)   Review of Systems   Constitutional: Negative for chills, diaphoresis and fever. HENT: Negative for ear pain, facial swelling and trouble swallowing. Eyes: Negative for photophobia and pain. Respiratory: Negative for cough, choking, chest tightness, shortness of breath, wheezing and stridor. Cardiovascular: Positive for leg swelling. Negative for chest pain and palpitations.    Gastrointestinal: Negative for abdominal pain, constipation, diarrhea, nausea and vomiting. Genitourinary: Negative for dysuria, hematuria and urgency. Musculoskeletal: Negative for back pain and myalgias. Skin: Positive for color change. Neurological: Negative for dizziness, weakness, light-headedness, numbness and headaches. Psychiatric/Behavioral: The patient is nervous/anxious. All other systems reviewed and are negative.           PAST MEDICAL HISTORY     Past Medical History:   Diagnosis Date    Anxiety     Chronic back pain     Headache(784.0)     Hx of blood clots     march 2016-was on xarelto for 6months    Obstructive apnea     Seizures ([facility] [address]     states no longer  has them-last one was in 2013-june       SURGICAL HISTORY       Past Surgical History:   Procedure Laterality Date    EPIDURAL STEROID INJECTION N/A 9/10/2019    MIDLINE LUMBAR FIVE SACRAL ONE EPIDURAL STEROID INJECTION SITE CONFIRMED BY FLUOROSCOPY performed by Wendy Bashir MD at [address] Right 2003    RIGHT KNEE    KNEE SURGERY Left 2012    LASIK Bilateral 2004    PARTIAL HYSTERECTOMY  11.4.16    Dr. Merlin Molt       Previous Medications    CEPHALEXIN (KEFLEX) 500 MG CAPSULE    Take 1 capsule by mouth 4 times daily for 7 days    DOXYCYCLINE HYCLATE (VIBRA-TABS) 100 MG TABLET    Take 1 tablet by mouth 2 times daily for 7 days    LACTOBACILLUS (CULTURELLE) CAPSULE    Take 1 capsule by mouth 2 times daily (with meals) for 7 days    MULTIPLE VITAMINS-MINERALS (MULTIVITAMIN ADULT PO)    Take 1 tablet by mouth daily       ALLERGIES     Latex, Bee pollen, and Lime flavor [flavoring agent]    FAMILY HISTORY       Family History   Problem Relation Age of Onset    Heart Disease Father     Other Father         LILIAN    Diabetes Father     Alcohol Abuse Father     Obesity Father     Alzheimer's Disease Other         Grandparents    Breast Cancer Other         Aunt    Lung Cancer Other Grandma    Colon Cancer Other         Grandpa    Diabetes Other         Grandma    Seizures Sister         Luis Alfredo Gates, son        SOCIAL HISTORY       Social History     Socioeconomic History    Marital status: Single     Spouse name: None    Number of children: None    Years of education: None    Highest education level: None   Occupational History    Occupation: Housekeeping      Employer: [employer]     Comment: on [address] resource strain: Not very hard    Food insecurity     Worry: Sometimes true     Inability: Sometimes true    Transportation needs     Medical: Yes     Non-medical: Yes   Tobacco Use    Smoking status: Former Smoker     Years: 1.00     Types: Cigarettes     Quit date: 1987     Years since quittin.3    Smokeless tobacco: Never Used    Tobacco comment: counseled on tobacco exposure avoidance   Substance and Sexual Activity    Alcohol use: No     Alcohol/week: 0.0 standard drinks     Frequency: Never     Binge frequency: Never    Drug use: No    Sexual activity: Never   Lifestyle    Physical activity     Days per week: 0 days     Minutes per session: 0 min    Stress: Rather much   Relationships    Social connections     Talks on phone: None     Gets together: None     Attends Restorationist service: None     Active member of club or organization: None     Attends meetings of clubs or organizations: None     Relationship status: None    Intimate partner violence     Fear of current or ex partner: None     Emotionally abused: None     Physically abused: None     Forced sexual activity: None   Other Topics Concern    None   Social History Narrative    19: Patient currently using two [employer] food panteries and [employer] as needed for food shortages. She has also used financial assistance resources at her [employer], she is not eligable again for 6 months.  [facility] will send via e-mail per patients request list of other local organizations that may offer food and financial assistance. Patient has also reached out to life matters and she attended one counseling session but has not rescheduled. AC encouraged patient to continue counseling services with life matters; [facility] sent [facility] Brochure and contact number; encouraged her to reach out for other resources as well. SCREENINGS           PHYSICAL EXAM    (up to 7 for level 4, 8 or more for level 5)     ED Triage Vitals [02/18/21 1418]   BP Temp Temp Source Pulse Resp SpO2 Height Weight   (!) 140/81 97.3 °F (36.3 °C) Oral 71 14 96 % -- --       Physical Exam  Vitals signs and nursing note reviewed. Constitutional:       General: She is not in acute distress. Appearance: She is obese. She is not ill-appearing, toxic-appearing or diaphoretic. HENT:      Head: Normocephalic and atraumatic. Right Ear: External ear normal.      Left Ear: External ear normal.   Eyes:      General: No scleral icterus. Pupils: Pupils are equal, round, and reactive to light. Neck:      Musculoskeletal: Normal range of motion and neck supple. Cardiovascular:      Rate and Rhythm: Normal rate and regular rhythm. Pulses: Normal pulses. Heart sounds: Normal heart sounds. Pulmonary:      Effort: Pulmonary effort is normal.      Breath sounds: Normal breath sounds. Abdominal:      General: Abdomen is flat. There is no distension. Tenderness: There is no abdominal tenderness. There is no guarding. Musculoskeletal:         General: Swelling and tenderness present. Left lower leg: She exhibits tenderness. Legs:    Skin:     General: Skin is warm and dry. Capillary Refill: Capillary refill takes less than 2 seconds. Neurological:      General: No focal deficit present. Mental Status: She is alert and oriented to person, place, and time. Psychiatric:         Mood and Affect: Mood is anxious. Behavior: Behavior normal.         Thought Content:  Thought content normal.           DIAGNOSTIC RESULTS     EKG (Per Emergency Physician):       RADIOLOGY (Per Emergency Physician): Interpretation per the Radiologist below, if available at the time of this note:  No results found. ED BEDSIDE ULTRASOUND:   Performed by ED Physician - none    LABS:  Labs Reviewed   COMPREHENSIVE METABOLIC PANEL W/ REFLEX TO MG FOR LOW K - Abnormal; Notable for the following components:       Result Value    BUN 24 (*)     Albumin/Globulin Ratio 0.9 (*)     All other components within normal limits    Narrative:     Performed at:  [facility]  [address] [place], [place] [address]   Phone [phone]   CBC WITH AUTO DIFFERENTIAL    Narrative:     Performed at:  [facility]  [address] [place], [place] [address]   Phone [phone]        All other labs were within normal range or not returned as of this dictation. Procedures      EMERGENCY DEPARTMENT COURSE and DIFFERENTIAL DIAGNOSIS/MDM:   Vitals:    Vitals:    02/18/21 1418 02/18/21 1700   BP: (!) 140/81 138/88   Pulse: 71 69   Resp: 14    Temp: 97.3 °F (36.3 °C)    TempSrc: Oral    SpO2: 96%        Medications   HYDROcodone-acetaminophen (NORCO) 5-325 MG per tablet 1 tablet (1 tablet Oral Given 2/18/21 1544)   ibuprofen (ADVIL;MOTRIN) tablet 400 mg (400 mg Oral Given 2/18/21 1544)       MDM. Patient is a 80-year-old with a history of left leg cellulitis on antibiotic presents with some pain to the posterior calf and knee. Patient was supposed to get a DVT study i.e. ultrasound but the order was lost was never put in. Patient is here wanting that. There is no shortness of breath no evidence of a PE. We did order it however because days left at 3 PM we were unable to get it. We will do it as an outpatient. Patient was given some Motrin and Norco with full resolution of the pain. I suspect she is at risk but it could be resolving cellulitis. Patient will be discharged on pain control and an outpatient venous Doppler study of the left lower extremities. REVAL:         CRITICAL CARE TIME   Total CriticalCare time was 0 minutes, excluding separately reportable procedures. There was a high probability of clinically significant/life threatening deterioration in the patient's condition which required my urgent intervention. CONSULTS:  None    PROCEDURES:  Unless otherwise noted below, none     [unfilled]    FINAL IMPRESSION      1. Cellulitis and abscess of left lower extremity    2. Cellulitis of left lower extremity          DISPOSITION/PLAN   DISPOSITION        PATIENT REFERRED TO:  Cara Verma MD  [address] [place] [address]. [address] [place] [address]  [phone]    Call in 3 days  As needed, If symptoms worsen, For wound re-check    Vascular Lab  For your doppler exam on your leg  Go to   for ultrasound      DISCHARGE MEDICATIONS:  New Prescriptions    HYDROCODONE-ACETAMINOPHEN (NORCO) 5-325 MG PER TABLET    Take 1 tablet by mouth every 6 hours as needed for Pain for up to 3 days. Intended supply: 3 days. Take lowest dose possible to manage pain    IBUPROFEN (ADVIL;MOTRIN) 600 MG TABLET    Take 1 tablet by mouth every 6 hours as needed for Pain          (Please note:  Portions of this note were completed with a voice recognition program.Efforts were made to edit the dictations but occasionally words and phrases are mis-transcribed.)  Form v2016. J.5-cn    Carlos BARRIGA MD (electronically signed)  Emergency Medicine Provider        Sahil Gillespie MD  02/18/21 3165

## 2021-02-18 NOTE — ED NOTES
Discharge and education instructions reviewed. Patient verbalized understanding, teach-back successful. Patient denied questions at this time. No acute distress noted. Patient instructed to follow-up as noted - return to emergency department if symptoms worsen. Patient verbalized understanding. Discharged per EDMD with discharged instructions.        Bon Castano RN  02/18/21 5698

## 2021-02-18 NOTE — ED NOTES
Bed: C-19  Expected date:   Expected time:   Means of arrival:   Comments:  Baldomero cellulitis     Karen Robbins RN  02/18/21 3002

## 2021-02-18 NOTE — LETTER
[facility] Emergency Department  [address] [employer] [address]  Phone: [phone]               February 18, 2021    Patient: Emani Mabry   YOB: 1970   Date of Visit: 2/18/2021       To Whom It May Concern:    Patricia Hernandez was seen and treated in our emergency department on 2/18/2021. She may return to work on 2/22/21.       Sincerely,       Teressa Holcomb RN         Signature:__________________________________

## 2021-02-19 ENCOUNTER — HOSPITAL ENCOUNTER (OUTPATIENT)
Dept: VASCULAR LAB | Age: 51
Discharge: HOME | End: 2021-02-19
Payer: MEDICAID

## 2021-02-19 DIAGNOSIS — L02.416 CELLULITIS AND ABSCESS OF LEFT LOWER EXTREMITY: ICD-10-CM

## 2021-02-19 DIAGNOSIS — L03.116 CELLULITIS AND ABSCESS OF LEFT LOWER EXTREMITY: ICD-10-CM

## 2021-02-19 PROCEDURE — 93971 EXTREMITY STUDY: CPT

## 2021-02-22 ENCOUNTER — OFFICE VISIT (OUTPATIENT)
Dept: FAMILY MEDICINE CLINIC | Age: 51
End: 2021-02-22
Payer: MEDICAID

## 2021-02-22 VITALS
SYSTOLIC BLOOD PRESSURE: 106 MMHG | HEIGHT: 67 IN | OXYGEN SATURATION: 99 % | DIASTOLIC BLOOD PRESSURE: 66 MMHG | TEMPERATURE: 97.9 F | WEIGHT: 221.2 LBS | RESPIRATION RATE: 14 BRPM | HEART RATE: 80 BPM | BODY MASS INDEX: 34.72 KG/M2

## 2021-02-22 DIAGNOSIS — L02.416 CELLULITIS AND ABSCESS OF LEFT LOWER EXTREMITY: Primary | ICD-10-CM

## 2021-02-22 DIAGNOSIS — L03.116 CELLULITIS AND ABSCESS OF LEFT LOWER EXTREMITY: Primary | ICD-10-CM

## 2021-02-22 PROCEDURE — 1036F TOBACCO NON-USER: CPT | Performed by: INTERNAL MEDICINE

## 2021-02-22 PROCEDURE — 1111F DSCHRG MED/CURRENT MED MERGE: CPT | Performed by: INTERNAL MEDICINE

## 2021-02-22 PROCEDURE — G8484 FLU IMMUNIZE NO ADMIN: HCPCS | Performed by: INTERNAL MEDICINE

## 2021-02-22 PROCEDURE — G8427 DOCREV CUR MEDS BY ELIG CLIN: HCPCS | Performed by: INTERNAL MEDICINE

## 2021-02-22 PROCEDURE — 99213 OFFICE O/P EST LOW 20 MIN: CPT | Performed by: INTERNAL MEDICINE

## 2021-02-22 PROCEDURE — G8417 CALC BMI ABV UP PARAM F/U: HCPCS | Performed by: INTERNAL MEDICINE

## 2021-02-22 RX ORDER — DOXYCYCLINE HYCLATE 100 MG
100 TABLET ORAL 2 TIMES DAILY
Qty: 14 TABLET | Refills: 0 | Status: SHIPPED | OUTPATIENT
Start: 2021-02-22 | End: 2021-03-01 | Stop reason: SDUPTHER

## 2021-02-22 RX ORDER — CEPHALEXIN 500 MG/1
500 CAPSULE ORAL 4 TIMES DAILY
COMMUNITY
End: 2021-04-29

## 2021-02-22 ASSESSMENT — ENCOUNTER SYMPTOMS
SHORTNESS OF BREATH: 0
DIARRHEA: 0
COUGH: 0
COLOR CHANGE: 0
NAUSEA: 0

## 2021-02-22 NOTE — PROGRESS NOTES
2/22/2021    Chief Complaint   Patient presents with    Follow-up     pt was in ER for cellulitis of L leg on 2/18/2021. leg is still red. sometimes can be painful. HPI    Left leg cellulitis   She called me on 2/11  Went to urgent care on 2/12      Was treated with steroids and antibiotic  Was given cephalexin    Then went to ER on  2/13  - went to ER bc of pain   Was admitted over night  Had ct with contrast.  Never had an ultrasound  Was told to have an ultrasound but never had it  Cut down on keflex to take 1 pill q 6 hr instead of  2 q 6hr  Was told to stop the steroids    Called to do ultrasound but not order. Started to have problems with the left post calf  Went back to the ER. Was not worried   But she still needed ultrasound    Had ultrasound done  And was ok    Today is better.    still has pain and a bumpVL Extremity Venous Left  Status: Final result   Order Providers    Authorizing Encounter Billing   Summer Bentley MD [facility], [address] [place] [address] ROOM 2 Amber Arango MD          Signed by    Signed Date/Time Phone Pager   Aleksey Plan 2/19/2021  7:21 [phone]    Reading Providers    Read Date Phone Pager   Aleksey Plan Fri Feb 19, 2021  [phone]    Routing History    Priority Sent On From To Message Type    2/19/2021 11:26 AM Sree, Fabiano Incoming Cardiovascular Orders From [facility] [address] Radiology F/U Results   Radiation Dose Estimates  2/19/21  No radiation information found for this patient   Narrative   Lower Extremities DVT Study        Demographics        Patient Name     [facility] ANGELA SHETTY        Date of Study     02/19/2021        Gender             Female        Patient Number    [id number]       Aspen Dru of Birth      1970        Visit Number      [id number]         Age                50 year(s)        Accession Number  [id number]        Room Number        Corporate ID      [id number]           Sonographer       Gila Song        Ordering         Nishant Hathaway MD Interpreting       [facility] Vasc Surg    Physician                           Physician          Royce Stewart MD       Procedure       Type of Study:        Veins:Lower Extremities DVT Study, VL EXTREMITY VENOUS DUPLEX LEFT.      Vascular Sonographer Report       Indications for Study:Leg pain and Swelling.       Additional Indications:Leg/calf pain for two weeks   Cellulitis diagnosis   knot behind the lower thigh       Impressions       Left Impression   No evidence of deep vein or superficial vein thrombosis involving the left   lower extremity and the right common femoral vein. Lymph nodes noted in the left groin       Conclusions        Summary        No evidence of deep vein or superficial vein thrombosis involving the left    lower extremity and the contralateral proximal right common femoral vein.    String of 2 cm lymph nodes in the left groin region                                                                                                                                                                                                                                                                            2/13/21 ct femur   Impression   1. Subcutaneous edema predominant involving the medial soft tissues of the   thigh and dorsal soft tissues of the lower leg.  Correlate clinically for   cellulitis.  No organized drainable fluid collection or subcutaneous gas   identified. 2. Left inguinal lymphadenopathy, likely reactive. 3. No CT evidence of osteomyelitis.  No acute osseous abnormality.               Review of Systems   Constitutional: Negative for chills and fever. Respiratory: Negative for cough and shortness of breath. Gastrointestinal: Negative for diarrhea and nausea. Musculoskeletal: Positive for gait problem (sometimes with pain). Skin: Negative for color change and rash. Health Maintenance   Topic Date Due    Hepatitis C screen  1970    HIV screen  1986    Cervical cancer screen  2017    Flu vaccine (1) 2020    Lipid screen  2024    DTaP/Tdap/Td vaccine (3 - Td) 2028    Hepatitis A vaccine  Aged Out    Hepatitis B vaccine  Aged Out    Hib vaccine  Aged Out    Meningococcal (ACWY) vaccine  Aged Out    Pneumococcal 0-64 years Vaccine  Aged Out      Social History     Tobacco Use    Smoking status: Former Smoker     Years: 1.00     Types: Cigarettes     Quit date: 1987     Years since quittin.3    Smokeless tobacco: Never Used    Tobacco comment: counseled on tobacco exposure avoidance   Substance Use Topics    Alcohol use: No     Alcohol/week: 0.0 standard drinks     Frequency: Never     Binge frequency: Never    Drug use: No      Family History   Problem Relation Age of Onset    Heart Disease Father     Other Father         LILIAN    Diabetes Father     Alcohol Abuse Father     Obesity Father     Alzheimer's Disease Other         Grandparents    Breast Cancer Other         Aunt    Lung Cancer Other         Grandma    Colon Cancer Other         Grandpa    Diabetes Other         Grandma    Seizures Sister         Shemar Bonilla, son     Prior to Visit Medications    Medication Sig Taking? Authorizing Provider   cephALEXin (KEFLEX) 500 MG capsule Take 500 mg by mouth 4 times daily One capsule every 6 hours.  Yes Historical Provider, MD   ibuprofen (ADVIL;MOTRIN) 600 MG tablet Take 1 tablet by mouth every 6 hours as needed for Pain Yes Tammi Gregg MD   Multiple Vitamins-Minerals (MULTIVITAMIN ADULT PO) Take 1 tablet by mouth daily Yes Historical Provider, MD     Patient Active Problem List   Diagnosis    Left knee pain    Low back pain--prn pain meds (nsaids)    Herniated nucleus pulposus, L5-S1    Anxiety--on ssri and seroquel with help--does not see psychiatrist currently    GERD (gastroesophageal reflux Mood and Affect: Mood normal.         Behavior: Behavior normal.         Thought Content: Thought content normal.         Judgment: Judgment normal.     left leg has firm induration left calf  Few cm in diameter? 4 cm  Has scabbed hard lesion left popliteal area  Both areas were painful    Antisteptolysin o titer is high at  221    2 blood cx negative    BP Readings from Last 5 Encounters:   02/22/21 106/66   02/18/21 138/88   02/14/21 116/72   11/19/20 (!) 94/56   08/20/20 103/63       Wt Readings from Last 5 Encounters:   02/22/21 221 lb 3.2 oz (100.3 kg)   02/14/21 222 lb 10.6 oz (101 kg)   11/19/20 219 lb (99.3 kg)   08/20/20 219 lb (99.3 kg)   07/09/20 219 lb 12.8 oz (99.7 kg)      Ny was seen today for follow-up. Diagnoses and all orders for this visit:    Cellulitis and abscess of left lower extremity    Other orders  -     doxycycline hyclate (VIBRA-TABS) 100 MG tablet;  Take 1 tablet by mouth 2 times daily for 7 days    she is eating yogart    Will continue keflex  500 mg one q 6 yrs  Send mychart  In  3 days if getting better or worse  Adding doxycylcine   Her mrsa nasal screen was neg  antisteptolysin titer was high  Very unusual case  Fu with one week  Call any fever go back to er

## 2021-02-25 ENCOUNTER — OFFICE VISIT (OUTPATIENT)
Dept: ORTHOPEDIC SURGERY | Age: 51
End: 2021-02-25
Payer: MEDICAID

## 2021-02-25 VITALS
SYSTOLIC BLOOD PRESSURE: 101 MMHG | HEART RATE: 73 BPM | TEMPERATURE: 97.9 F | HEIGHT: 67 IN | DIASTOLIC BLOOD PRESSURE: 63 MMHG | BODY MASS INDEX: 34.69 KG/M2 | WEIGHT: 221 LBS

## 2021-02-25 DIAGNOSIS — M54.50 LOW BACK PAIN, UNSPECIFIED BACK PAIN LATERALITY, UNSPECIFIED CHRONICITY, UNSPECIFIED WHETHER SCIATICA PRESENT: Primary | ICD-10-CM

## 2021-02-25 PROCEDURE — 99213 OFFICE O/P EST LOW 20 MIN: CPT | Performed by: ORTHOPAEDIC SURGERY

## 2021-02-25 PROCEDURE — 1036F TOBACCO NON-USER: CPT | Performed by: ORTHOPAEDIC SURGERY

## 2021-02-25 PROCEDURE — G8427 DOCREV CUR MEDS BY ELIG CLIN: HCPCS | Performed by: ORTHOPAEDIC SURGERY

## 2021-02-25 PROCEDURE — 1111F DSCHRG MED/CURRENT MED MERGE: CPT | Performed by: ORTHOPAEDIC SURGERY

## 2021-02-25 PROCEDURE — G8417 CALC BMI ABV UP PARAM F/U: HCPCS | Performed by: ORTHOPAEDIC SURGERY

## 2021-02-25 PROCEDURE — G8484 FLU IMMUNIZE NO ADMIN: HCPCS | Performed by: ORTHOPAEDIC SURGERY

## 2021-02-25 RX ORDER — HYDROCODONE BITARTRATE AND ACETAMINOPHEN 5; 325 MG/1; MG/1
1 TABLET ORAL EVERY 6 HOURS PRN
COMMUNITY
End: 2021-04-29

## 2021-02-25 NOTE — LETTER
[address] [address]  Phone: [phone]  Fax: [phone]    Johnathan Bob MD        February 25, 2021     Patient: Aden Garrido   YOB: 1970   Date of Visit: 2/25/2021       To Whom It May Concern:       Patient is currently under evaluation through our office. Please note that Ny may need occasional periods of sitting based upon any increase in her pain levels. This may necessitate up to 15 to 20 minutes of sitting time but there are days or weeks in which she may not require this at all.         Please maintain 6 hours of work per day for additional 6 months to maintain her at the optimum work ability.       If you have any questions or concerns, please don't hesitate to call.     Sincerely,        Johnathan Bob MD

## 2021-02-25 NOTE — PROGRESS NOTES
FOLLOW UP: SPINE    CHIEF COMPLAINT:    Chief Complaint   Patient presents with    Back Pain     L-Spine     Currently:     Pain in the low back controlled. Work schedule with limitations of activity continuing to help her overall ability to tolerate daily schedule. PReviously    PATIENT IS ON NO MEDICATIONS FOR HER BACK PAIN>   Difficulty with losing weight    Patient is doing better with overall back pain with current job of standing at [employer]. Occasional increase in pain overall with bending over and picking up objects. Only using tens unit at this point and some biofreeze. 7/9/2020  Patient for follow-up regarding chronic low back pain. She notes that overall she is doing better she has periods where intermittently the back will flareup where she utilizes topical medications as well as her TENS unit and rest stretching to help calm it down settle it but it has been doing well. She is continuing to do physical therapy and exercises on her own at home which are helping to improve her overall strength. She notes that now she is working the door as a  at [employer] and is standing outside [employer] as they come in and out to make sure they do not go over the allowed census within the building at one time. She notes that when she is allowed to utilize a chair stool she does much better but on the days when they do not allow her to utilize a chair or stool as she reaches the end of 4-1/2 to 5 hours she starts to notice the back start to increase in irritation and tighten up on her. Denies numbness burning tingling radiating pains down the leg notes that it is mostly a irritation and tightness across the low back. Previously: 5/28/2020    Had adjustment with improvement in pain. Pain is controlled overall in current work detail. Doing therapy with back , treadmill and stretching activites. Previously    Increased pain in the low back.  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 Back pain started as small amount of irritation with increase during the day. Previously. No chiropractic intervention. Doing better overall. Now moved to [employer]. Moving well overall. 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 Previously:    Evaluation today with market increase in pain after attempted return to work. Symptoms are now more on the left side greater than the right with radiation into the buttock and down the leg    No bowel or bladder dysfunction  Limited ability to mobilize as well as discomfort with sitting and sleeping have prompted her early return to the office          Previously:  After last AYAAN she felt her pain was markedly worse to the point of inability to get out of the house one week later  sympotms were not associated with any fever, chills radiation of pain down the leg, or bowel bladder changes. Irritation of the back increased overall initially post epidural and now are decreased again. Symptoms are not better than before the ayaan however although she does state that the pain in the leg may have changed. Previously:    HISTORY OF PRESENT ILLNESS:                The patient is a 53 y.o. female initially seen in consultation by Dr Margarita Briscoe for back pain here to follow-up after midline L5-S1 LESI #1 9/10/2019 for aching low back pain which she relates to a work injury April 5th. Back pain is constant. Symptoms are exacerbated by \"everything\". Some relief with resting. She denies any improvement with recent AYAAN. She now states she feels some \"tightness\" in her back. Other conservative care includes PT, naproxen, baclofen. Overall she is not improved. She denies any clear-cut lower extremity radiating pain, no progressive numbness tingling weakness.         Past Medical History:   Diagnosis Date    Anxiety     Disp: 20 tablet, Rfl: 0    Multiple Vitamins-Minerals (MULTIVITAMIN ADULT PO), Take 1 tablet by mouth daily, Disp: , Rfl:   Allergies:  Latex, Bee pollen, and Lime flavor [flavoring agent]  Social History:    reports that she quit smoking about 33 years ago. Her smoking use included cigarettes. She quit after 1.00 year of use. She has never used smokeless tobacco. She reports that she does not drink alcohol or use drugs. Family History:   Family History   Problem Relation Age of Onset    Heart Disease Father     Other Father         LILIAN    Diabetes Father     Alcohol Abuse Father     Obesity Father     Alzheimer's Disease Other         Grandparents    Breast Cancer Other         Aunt    Lung Cancer Other         Grandma    Colon Cancer Other         Grandpa    Diabetes Other         Grandma    Seizures Sister         Uncle, son       REVIEW OF SYSTEMS: Full ROS noted & scanned   CONSTITUTIONAL: Denies unexplained weight loss, fevers, chills or fatigue  NEUROLOGICAL: Denies unsteady gait or progressive weakness      PHYSICAL EXAM:    Vitals: Blood pressure 101/63, pulse 73, temperature 97.9 °F (36.6 °C), height 5' 7\" (1.702 m), weight 221 lb (100.2 kg), last menstrual period 11/02/2016, not currently breastfeeding. GENERAL EXAM:  · General Apparence: Patient is adequately groomed with no evidence of malnutrition. · Orientation: The patient is oriented to time, place and person. · Mood & Affect:The patient's mood and affect are appropriate   · Lymphatic: The lymphatic examination bilaterally reveals all areas to be without enlargement or induration  · Sensation: Sensation is intact without deficit  · Coordination/Balance: Good coordination     LUMBAR/SACRAL EXAMINATION:  · Inspection: Local inspection shows no step-off or bruising.   Lumbar alignment is normal.  Sagittal and Coronal balance is neutral.    Some stiffness with standing and walking and notable irritability with facet testing  · Palpation:   No evidence of tenderness at the midline. Tenderness bilaterally at the paraspinal and trochanters. And in the Right SI joint greater than left. ·   There is no step-off or paraspinal spasm. · Range of Motion: 40 degrees of flexion, 5 degrees extension  · Spasm of the low back with irritation in the iliosacral spine reduced compared to previous evaluation  · Rectus tightness. · Hamstring  Unable to get within 2 feet. · Sitting comfrotably. ·   · Strength:   Strength testing is 5/5 in all muscle groups tested although some give way weakness is noted on the left side quad anterior tib and plantarflexion  ·   · Special Tests:   Straight leg raise and crossed SLR negative on the right although mildly positive on the left. Leg length and pelvis level.  0 out of 5 Minesh's signs. · Skin: There are no rashes, ulcerations or lesions. · Reflexes: Reflexes are symmetrically 2+ at the patellar and ankle tendons. Clonus absent bilaterally at the feet. · Gait & station: Normal gait  · Additional Examinations:   · RIGHT LOWER EXTREMITY: Inspection/examination of the right lower extremity does not show any tenderness, deformity or injury. Range of motion is full. There is no gross instability. There are no rashes, ulcerations or lesions. Strength and tone are normal.  ·   · LEFT LOWER EXTREMITY:  Inspection/examination of the left lower extremity does not show any tenderness, deformity or injury. Range of motion is full. There is no gross instability. There are no rashes, ulcerations or lesions. Strength and tone are normal.    Hyperlordosis of the back still present with tight rectus on testing. Pain in the lowest aspect of the lumbar spine a t the LS junction. Diagnostic Testing:     lumbar MRI showing a central disc protrusion with mild loss of height L5-S1 and moderate stenosis. 6 lumbar vertebra.      X-rays show mild discogenic spondylosis without acute fracture        Impression:    Lumbar djd with disc degeneration. Improved pain overall with better mobility and activtiy level.     -Advised patient to continue with current work restrictions of limiting work to 5 hours at a time    -Based upon patient's symptom recurrence with work performance extending work hours would not be advised as it would tend to aggravate patient's back more until she can continue with her current physical therapy and home therapy regiment to gain more control and strengthening across her back will maintain current work restrictions. As a strengthening occurs will steadily increase work restrictions to begin to reach an 8-hour day. Have discussed chirpractic intervention, additional physical therapy  As needed and continued efforts at exercise program.          patient will require additional NEMS unit for muscluar stimulation in the back because of atrophy and limited activity levels at this point.

## 2021-03-01 ENCOUNTER — OFFICE VISIT (OUTPATIENT)
Dept: FAMILY MEDICINE CLINIC | Age: 51
End: 2021-03-01
Payer: MEDICAID

## 2021-03-01 VITALS
HEART RATE: 64 BPM | BODY MASS INDEX: 35.06 KG/M2 | OXYGEN SATURATION: 99 % | WEIGHT: 223.4 LBS | SYSTOLIC BLOOD PRESSURE: 122 MMHG | HEIGHT: 67 IN | DIASTOLIC BLOOD PRESSURE: 68 MMHG | TEMPERATURE: 96.9 F | RESPIRATION RATE: 16 BRPM

## 2021-03-01 DIAGNOSIS — L02.416 ABSCESS OF LEFT LEG: Primary | ICD-10-CM

## 2021-03-01 PROCEDURE — 1036F TOBACCO NON-USER: CPT | Performed by: INTERNAL MEDICINE

## 2021-03-01 PROCEDURE — 1111F DSCHRG MED/CURRENT MED MERGE: CPT | Performed by: INTERNAL MEDICINE

## 2021-03-01 PROCEDURE — 99213 OFFICE O/P EST LOW 20 MIN: CPT | Performed by: INTERNAL MEDICINE

## 2021-03-01 PROCEDURE — G8427 DOCREV CUR MEDS BY ELIG CLIN: HCPCS | Performed by: INTERNAL MEDICINE

## 2021-03-01 PROCEDURE — G8484 FLU IMMUNIZE NO ADMIN: HCPCS | Performed by: INTERNAL MEDICINE

## 2021-03-01 PROCEDURE — G8417 CALC BMI ABV UP PARAM F/U: HCPCS | Performed by: INTERNAL MEDICINE

## 2021-03-01 RX ORDER — DOXYCYCLINE HYCLATE 100 MG
100 TABLET ORAL 2 TIMES DAILY
Qty: 14 TABLET | Refills: 0 | Status: SHIPPED | OUTPATIENT
Start: 2021-03-01 | End: 2021-03-08

## 2021-03-01 ASSESSMENT — ENCOUNTER SYMPTOMS
COUGH: 0
VOMITING: 0
SHORTNESS OF BREATH: 0
NAUSEA: 0

## 2021-03-01 NOTE — LETTER
[facility] [address]  Phone: [phone]  Fax: [phone]    Daphne Randall MD        March 1, 2021     Patient: Rene Rape   YOB: 1970   Date of Visit: 3/1/2021       To Whom it May Concern:    Kushal Galeano was seen in my clinic on 3/1/2021. She will need an ultrasound done and needs to stay off work until that is done. If you have any questions or concerns, please don't hesitate to call.     Sincerely,         Daphne Randall MD

## 2021-03-01 NOTE — PROGRESS NOTES
3/1/2021    Chief Complaint   Patient presents with    Follow-up     Patient had cellulitis in left leg. It has improved. Patient states only slight redness but no swelling or increased warmth       HPI  Left leg is better  Taking doxycycline - last dose tomorrow  No pain anymore  Still hard in spots. Able to walk ok  No longer painful  She already went back to work  Has a tiny red spot on the left leg. Review of Systems   Constitutional: Negative for chills and fever. Respiratory: Negative for cough and shortness of breath. Gastrointestinal: Negative for nausea and vomiting. Neurological: Negative for dizziness and light-headedness.        Health Maintenance   Topic Date Due    Hepatitis C screen  1970    HIV screen  1986    Cervical cancer screen  2017    Lipid screen  2024    DTaP/Tdap/Td vaccine (3 - Td) 2028    Flu vaccine  Completed    Hepatitis A vaccine  Aged Out    Hepatitis B vaccine  Aged Out    Hib vaccine  Aged Out    Meningococcal (ACWY) vaccine  Aged Out    Pneumococcal 0-64 years Vaccine  Aged Out      Social History     Tobacco Use    Smoking status: Former Smoker     Years: 1.00     Types: Cigarettes     Quit date: 1987     Years since quittin.3    Smokeless tobacco: Never Used    Tobacco comment: counseled on tobacco exposure avoidance   Substance Use Topics    Alcohol use: No     Alcohol/week: 0.0 standard drinks     Frequency: Never     Binge frequency: Never    Drug use: No      Family History   Problem Relation Age of Onset    Heart Disease Father     Other Father         LILIAN    Diabetes Father     Alcohol Abuse Father     Obesity Father     Alzheimer's Disease Other         Grandparents    Breast Cancer Other         Aunt    Lung Cancer Other         Grandma    Colon Cancer Other         Grandpa    Diabetes Other         Grandma    Seizures Sister         Shemar Bonilla, son     Prior to Visit Medications    Medication Sig Taking? Authorizing Provider   doxycycline hyclate (VIBRA-TABS) 100 MG tablet Take 1 tablet by mouth 2 times daily for 7 days Yes Avinash Mohamud MD   Multiple Vitamins-Minerals (MULTIVITAMIN ADULT PO) Take 1 tablet by mouth daily Yes Historical Provider, MD   HYDROcodone-acetaminophen (NORCO) 5-325 MG per tablet Take 1 tablet by mouth every 6 hours as needed for Pain. Historical Provider, MD   cephALEXin (KEFLEX) 500 MG capsule Take 500 mg by mouth 4 times daily One capsule every 6 hours.   Historical Provider, MD   ibuprofen (ADVIL;MOTRIN) 600 MG tablet Take 1 tablet by mouth every 6 hours as needed for Pain  Maribel Gonsalves MD     Patient Active Problem List   Diagnosis    Left knee pain    Low back pain--prn pain meds (nsaids)    Herniated nucleus pulposus, L5-S1    Anxiety--on ssri and seroquel with help--does not see psychiatrist currently    GERD (gastroesophageal reflux disease)--off ppi currently--s/p egd 6/16--wnl    Obstructive apnea    Acute DVT of right tibial vein ([facility] [address]- started xarelto 3/9/16==saw dr moya-(hematol)-tx x 6 mo--feels provoked by dvt--rest of w/u pending post off meds    Colon polyp--on colo 6/16--dr wilcox    Pelvic pain in female    Adnexal mass    Iron deficiency anemia    Chest pain    Hypoglycemia    Thyroid nodule    Tinea pedis    Non morbid obesity, unspecified obesity type    Localized osteoarthritis of lumbar spine    Disc degeneration, lumbar    Cellulitis and abscess of left lower extremity    Cellulitis of left leg        LABS:   Lab Results   Component Value Date    GLUCOSE 92 02/18/2021     Lab Results   Component Value Date     02/18/2021    K 4.1 02/18/2021    CREATININE 0.6 02/18/2021     Cholesterol, Total   Date Value Ref Range Status   04/27/2019 157 0 - 199 mg/dL Final     LDL Calculated   Date Value Ref Range Status   04/27/2019 95 <100 mg/dL Final     HDL   Date Value Ref Range Status   04/27/2019 52 40 - 60 mg/dL Final Triglycerides   Date Value Ref Range Status   04/27/2019 49 0 - 150 mg/dL Final     Lab Results   Component Value Date    ALT 24 02/18/2021    AST 22 02/18/2021    ALKPHOS 99 02/18/2021    BILITOT 0.4 02/18/2021      Lab Results   Component Value Date    WBC 9.0 02/18/2021    HGB 12.9 02/18/2021    HCT 39.4 02/18/2021    MCV 86.4 02/18/2021     02/18/2021     TSH (uIU/mL)   Date Value   08/05/2015 0.94     Lab Results   Component Value Date    LABA1C 5.3 08/05/2015     No results found for: PSA, PSADIA     PHYSICAL EXAM:  /68 (Site: Right Upper Arm, Position: Sitting, Cuff Size: Large Adult)   Pulse 64   Temp 96.9 °F (36.1 °C) (Temporal)   Resp 16   Ht 5' 7\" (1.702 m)   Wt 223 lb 6.4 oz (101.3 kg)   LMP 11/02/2016   SpO2 99%   BMI 34.99 kg/m²    Physical Exam  Constitutional:       Appearance: Normal appearance. HENT:      Head: Normocephalic and atraumatic. Pulmonary:      Effort: Pulmonary effort is normal.   Neurological:      General: No focal deficit present. Mental Status: She is alert. Psychiatric:         Mood and Affect: Mood normal.         Behavior: Behavior normal.         Thought Content: Thought content normal.         Judgment: Judgment normal.     has very indurated and painful area in the popliteal area left leg  2-3 cm   Not red. The left calf has no significant induration  No left inguinal adenopathy      BP Readings from Last 5 Encounters:   03/01/21 122/68   02/25/21 101/63   02/22/21 106/66   02/18/21 138/88   02/14/21 116/72       Wt Readings from Last 5 Encounters:   03/01/21 223 lb 6.4 oz (101.3 kg)   02/25/21 221 lb (100.2 kg)   02/22/21 221 lb 3.2 oz (100.3 kg)   02/14/21 222 lb 10.6 oz (101 kg)   11/19/20 219 lb (99.3 kg)       Ny was seen today for follow-up. Diagnoses and all orders for this visit:    Abscess of left leg  -     US SOFT TISSUE LIMITED AREA; Future    Other orders  -     doxycycline hyclate (VIBRA-TABS) 100 MG tablet;  Take 1 tablet by mouth 2 times daily for 7 days    will continue doxy - seems to have helped  Will check and ultrasound to make sure no drainable area  Overall improved. I would like her off work until the ultrasound is done.   Fu in one week    Letter for work

## 2021-03-02 ENCOUNTER — HOSPITAL ENCOUNTER (OUTPATIENT)
Dept: ULTRASOUND IMAGING | Age: 51
Discharge: HOME | End: 2021-03-02
Payer: MEDICAID

## 2021-03-02 DIAGNOSIS — L02.416 ABSCESS OF LEFT LEG: ICD-10-CM

## 2021-03-02 PROCEDURE — 76999 ECHO EXAMINATION PROCEDURE: CPT

## 2021-03-04 ENCOUNTER — TELEPHONE (OUTPATIENT)
Dept: FAMILY MEDICINE CLINIC | Age: 51
End: 2021-03-04

## 2021-04-10 ENCOUNTER — APPOINTMENT (OUTPATIENT)
Dept: GENERAL RADIOLOGY | Age: 51
End: 2021-04-10
Payer: MEDICAID

## 2021-04-10 ENCOUNTER — HOSPITAL ENCOUNTER (EMERGENCY)
Age: 51
Discharge: HOME | End: 2021-04-10
Attending: EMERGENCY MEDICINE
Payer: MEDICAID

## 2021-04-10 VITALS
WEIGHT: 225 LBS | HEIGHT: 66 IN | HEART RATE: 61 BPM | SYSTOLIC BLOOD PRESSURE: 106 MMHG | TEMPERATURE: 98 F | OXYGEN SATURATION: 95 % | DIASTOLIC BLOOD PRESSURE: 64 MMHG | BODY MASS INDEX: 36.16 KG/M2 | RESPIRATION RATE: 15 BRPM

## 2021-04-10 DIAGNOSIS — R07.89 ATYPICAL CHEST PAIN: Primary | ICD-10-CM

## 2021-04-10 LAB
ALBUMIN SERPL-MCNC: 3.9 G/DL (ref 3.4–5)
ALP BLD-CCNC: 123 U/L (ref 40–129)
ALT SERPL-CCNC: 18 U/L (ref 10–40)
ANION GAP SERPL CALCULATED.3IONS-SCNC: 9 MMOL/L (ref 3–16)
AST SERPL-CCNC: 21 U/L (ref 15–37)
BASOPHILS ABSOLUTE: 0 K/UL (ref 0–0.2)
BASOPHILS RELATIVE PERCENT: 0.6 %
BILIRUB SERPL-MCNC: 0.5 MG/DL (ref 0–1)
BILIRUBIN DIRECT: <0.2 MG/DL (ref 0–0.3)
BILIRUBIN, INDIRECT: NORMAL MG/DL (ref 0–1)
BUN BLDV-MCNC: 24 MG/DL (ref 7–20)
CALCIUM SERPL-MCNC: 9.2 MG/DL (ref 8.3–10.6)
CHLORIDE BLD-SCNC: 105 MMOL/L (ref 99–110)
CO2: 26 MMOL/L (ref 21–32)
CREAT SERPL-MCNC: 0.6 MG/DL (ref 0.6–1.1)
D DIMER: 207 NG/ML DDU (ref 0–229)
EKG ATRIAL RATE: 80 BPM
EKG DIAGNOSIS: NORMAL
EKG P AXIS: 34 DEGREES
EKG P-R INTERVAL: 146 MS
EKG Q-T INTERVAL: 376 MS
EKG QRS DURATION: 72 MS
EKG QTC CALCULATION (BAZETT): 433 MS
EKG R AXIS: 42 DEGREES
EKG T AXIS: 42 DEGREES
EKG VENTRICULAR RATE: 80 BPM
EOSINOPHILS ABSOLUTE: 0.2 K/UL (ref 0–0.6)
EOSINOPHILS RELATIVE PERCENT: 2.6 %
GFR AFRICAN AMERICAN: >60
GFR NON-AFRICAN AMERICAN: >60
GLUCOSE BLD-MCNC: 90 MG/DL (ref 70–99)
HCT VFR BLD CALC: 40.4 % (ref 36–48)
HEMOGLOBIN: 13.3 G/DL (ref 12–16)
LIPASE: 52 U/L (ref 13–60)
LYMPHOCYTES ABSOLUTE: 1.9 K/UL (ref 1–5.1)
LYMPHOCYTES RELATIVE PERCENT: 26.9 %
MCH RBC QN AUTO: 28.6 PG (ref 26–34)
MCHC RBC AUTO-ENTMCNC: 32.9 G/DL (ref 31–36)
MCV RBC AUTO: 87.1 FL (ref 80–100)
MONOCYTES ABSOLUTE: 0.6 K/UL (ref 0–1.3)
MONOCYTES RELATIVE PERCENT: 8.6 %
NEUTROPHILS ABSOLUTE: 4.2 K/UL (ref 1.7–7.7)
NEUTROPHILS RELATIVE PERCENT: 61.3 %
PDW BLD-RTO: 15.9 % (ref 12.4–15.4)
PLATELET # BLD: 198 K/UL (ref 135–450)
PMV BLD AUTO: 9.7 FL (ref 5–10.5)
POTASSIUM REFLEX MAGNESIUM: 4.3 MMOL/L (ref 3.5–5.1)
PRO-BNP: 50 PG/ML (ref 0–124)
RBC # BLD: 4.64 M/UL (ref 4–5.2)
SODIUM BLD-SCNC: 140 MMOL/L (ref 136–145)
TOTAL PROTEIN: 7.5 G/DL (ref 6.4–8.2)
TROPONIN: <0.01 NG/ML
TROPONIN: <0.01 NG/ML
WBC # BLD: 6.9 K/UL (ref 4–11)

## 2021-04-10 PROCEDURE — 96374 THER/PROPH/DIAG INJ IV PUSH: CPT

## 2021-04-10 PROCEDURE — 36415 COLL VENOUS BLD VENIPUNCTURE: CPT

## 2021-04-10 PROCEDURE — 83880 ASSAY OF NATRIURETIC PEPTIDE: CPT

## 2021-04-10 PROCEDURE — 80076 HEPATIC FUNCTION PANEL: CPT

## 2021-04-10 PROCEDURE — 6360000002 HC RX W HCPCS: Performed by: PHYSICIAN ASSISTANT

## 2021-04-10 PROCEDURE — 84484 ASSAY OF TROPONIN QUANT: CPT

## 2021-04-10 PROCEDURE — 85379 FIBRIN DEGRADATION QUANT: CPT

## 2021-04-10 PROCEDURE — 93005 ELECTROCARDIOGRAM TRACING: CPT | Performed by: PHYSICIAN ASSISTANT

## 2021-04-10 PROCEDURE — 83690 ASSAY OF LIPASE: CPT

## 2021-04-10 PROCEDURE — 80048 BASIC METABOLIC PNL TOTAL CA: CPT

## 2021-04-10 PROCEDURE — 99284 EMERGENCY DEPT VISIT MOD MDM: CPT

## 2021-04-10 PROCEDURE — 71045 X-RAY EXAM CHEST 1 VIEW: CPT

## 2021-04-10 PROCEDURE — 85025 COMPLETE CBC W/AUTO DIFF WBC: CPT

## 2021-04-10 RX ORDER — LIDOCAINE 4 G/G
1 PATCH TOPICAL ONCE
Status: DISCONTINUED | OUTPATIENT
Start: 2021-04-10 | End: 2021-04-10 | Stop reason: HOSPADM

## 2021-04-10 RX ORDER — MORPHINE SULFATE 4 MG/ML
4 INJECTION, SOLUTION INTRAMUSCULAR; INTRAVENOUS ONCE
Status: COMPLETED | OUTPATIENT
Start: 2021-04-10 | End: 2021-04-10

## 2021-04-10 RX ADMIN — MORPHINE SULFATE 4 MG: 4 INJECTION INTRAVENOUS at 09:29

## 2021-04-10 ASSESSMENT — ENCOUNTER SYMPTOMS
NAUSEA: 0
SHORTNESS OF BREATH: 0
DIARRHEA: 0
VOMITING: 0
ABDOMINAL PAIN: 0
EYE REDNESS: 0

## 2021-04-10 ASSESSMENT — HEART SCORE: ECG: 0

## 2021-04-10 ASSESSMENT — PAIN SCALES - GENERAL
PAINLEVEL_OUTOF10: 2
PAINLEVEL_OUTOF10: 0

## 2021-04-10 ASSESSMENT — PAIN DESCRIPTION - PAIN TYPE: TYPE: ACUTE PAIN

## 2021-04-10 NOTE — ED PROVIDER NOTES
ED Attending Attestation Note     Date of evaluation: 4/10/2021    This patient was seen by the advance practice provider. I have seen and examined the patient, agree with the workup, evaluation, management and diagnosis. The care plan has been discussed. I have reviewed the ECG and concur with the GABRIEL's interpretation. My assessment reveals Lonni Hodgkin with acute chest pain that occurred at 8:00 this morning. Right sided. Not pleuritic.   She is awake alert breath sounds equal.  He has history of DVT in the past.  Will check D-dimer to markers of troponin and reassess     Sanjana Woods MD  04/10/21 [id number]

## 2021-04-10 NOTE — ED NOTES
Patient discharged with AVS and personal belongings. Reviewed and educated on medications. Verbalizes understanding of instructions and need for follow up. Denies any questions at this time. PIV discontinued. No s/sx of bleeding. Dressing C/D/I. No signs of acute distress.       Estrellita Cleveland RN  04/10/21 [id number]

## 2021-04-10 NOTE — ED PROVIDER NOTES
wound.   Neurological: Negative for dizziness, light-headedness and headaches. Psychiatric/Behavioral: The patient is not nervous/anxious. Past Medical, Surgical, Family, and Social History     She has a past medical history of Anxiety, Chronic back pain, Headache(784.0), Hx of blood clots, Obstructive apnea, and Seizures ([facility] [address] She has a past surgical history that includes knee surgery (Right, 2003); LASIK (Bilateral, 2004); knee surgery (Left, 2012); partial hysterectomy (cervix not removed) (11.4.16); shoulder surgery; and epidural steroid injection (N/A, 9/10/2019). Her family history includes Alcohol Abuse in her father; Alzheimer's Disease in an other family member; Breast Cancer in an other family member; Karlis Primer in an other family member; Diabetes in her father and another family member; Heart Disease in her father; Cookie Loron in an other family member; Obesity in her father; Other in her father; Seizures in her sister. She reports that she quit smoking about 33 years ago. Her smoking use included cigarettes. She quit after 1.00 year of use. She has never used smokeless tobacco. She reports that she does not drink alcohol or use drugs. Medications     Previous Medications    CEPHALEXIN (KEFLEX) 500 MG CAPSULE    Take 500 mg by mouth 4 times daily One capsule every 6 hours. HYDROCODONE-ACETAMINOPHEN (NORCO) 5-325 MG PER TABLET    Take 1 tablet by mouth every 6 hours as needed for Pain. IBUPROFEN (ADVIL;MOTRIN) 600 MG TABLET    Take 1 tablet by mouth every 6 hours as needed for Pain    MULTIPLE VITAMINS-MINERALS (MULTIVITAMIN ADULT PO)    Take 1 tablet by mouth daily       Allergies     She is allergic to latex; bee pollen; and lime flavor [flavoring agent]. Physical Exam     INITIAL VITALS: BP: 108/74,Temp: 97.7 °F (36.5 °C), Pulse: 80, Resp: 19, SpO2: 98 %   Physical Exam  Vitals signs and nursing note reviewed.    Constitutional:       General: She is not in acute distress. HENT:      Head: Normocephalic and atraumatic. Mouth/Throat:      Mouth: Mucous membranes are moist.   Eyes:      Extraocular Movements: Extraocular movements intact. Conjunctiva/sclera: Conjunctivae normal.   Neck:      Musculoskeletal: Neck supple. Cardiovascular:      Rate and Rhythm: Normal rate and regular rhythm. Pulmonary:      Effort: Pulmonary effort is normal. No respiratory distress. Breath sounds: Normal breath sounds. No wheezing, rhonchi or rales. Chest:      Chest wall: Tenderness present. Abdominal:      General: Bowel sounds are normal. There is no distension. Palpations: Abdomen is soft. Tenderness: There is no abdominal tenderness. There is no guarding or rebound. Musculoskeletal:         General: No tenderness (no calf tenderness). Right lower leg: No edema. Left lower leg: No edema. Skin:     General: Skin is warm and dry. Neurological:      Mental Status: She is alert and oriented to person, place, and time. Psychiatric:         Mood and Affect: Mood normal.         Behavior: Behavior normal.         Diagnostic Results     EKG   Interpreted in conjunction with emergencydepartment physician Ky Worrell MD  Rhythm: normal sinus   Rate: normal  Axis: normal  Ectopy: none  Conduction: normal  ST Segments: no acute change and normal  T Waves:no acute change and normal  Q Waves: none  Clinical Impression: no acute changes  Comparison:  6/22/2018    RADIOLOGY:  XR CHEST PORTABLE   Final Result      No acute cardiopulmonary findings.           LABS:   Results for orders placed or performed during the hospital encounter of 04/10/21   CBC Auto Differential   Result Value Ref Range    WBC 6.9 4.0 - 11.0 K/uL    RBC 4.64 4.00 - 5.20 M/uL    Hemoglobin 13.3 12.0 - 16.0 g/dL    Hematocrit 40.4 36.0 - 48.0 %    MCV 87.1 80.0 - 100.0 fL    MCH 28.6 26.0 - 34.0 pg    MCHC 32.9 31.0 - 36.0 g/dL    RDW 15.9 (H) 12.4 - 15.4 %    Platelets 825 122 - 450 K/uL    MPV 9.7 5.0 - 10.5 fL    Neutrophils % 61.3 %    Lymphocytes % 26.9 %    Monocytes % 8.6 %    Eosinophils % 2.6 %    Basophils % 0.6 %    Neutrophils Absolute 4.2 1.7 - 7.7 K/uL    Lymphocytes Absolute 1.9 1.0 - 5.1 K/uL    Monocytes Absolute 0.6 0.0 - 1.3 K/uL    Eosinophils Absolute 0.2 0.0 - 0.6 K/uL    Basophils Absolute 0.0 0.0 - 0.2 K/uL   Basic Metabolic Panel w/ Reflex to MG   Result Value Ref Range    Sodium 140 136 - 145 mmol/L    Potassium reflex Magnesium 4.3 3.5 - 5.1 mmol/L    Chloride 105 99 - 110 mmol/L    CO2 26 21 - 32 mmol/L    Anion Gap 9 3 - 16    Glucose 90 70 - 99 mg/dL    BUN 24 (H) 7 - 20 mg/dL    CREATININE 0.6 0.6 - 1.1 mg/dL    GFR Non-African American >60 >60    GFR African American >60 >60    Calcium 9.2 8.3 - 10.6 mg/dL   Troponin   Result Value Ref Range    Troponin <0.01 <0.01 ng/mL   Brain Natriuretic Peptide   Result Value Ref Range    Pro-BNP 50 0 - 124 pg/mL   Hepatic Function Panel   Result Value Ref Range    Total Protein 7.5 6.4 - 8.2 g/dL    Albumin 3.9 3.4 - 5.0 g/dL    Alkaline Phosphatase 123 40 - 129 U/L    ALT 18 10 - 40 U/L    AST 21 15 - 37 U/L    Total Bilirubin 0.5 0.0 - 1.0 mg/dL    Bilirubin, Direct <0.2 0.0 - 0.3 mg/dL    Bilirubin, Indirect see below 0.0 - 1.0 mg/dL   Lipase   Result Value Ref Range    Lipase 52.0 13.0 - 60.0 U/L   D-dimer, quantitative (Lab)   Result Value Ref Range    D-Dimer, Quant 207 0 - 229 ng/mL DDU   Troponin   Result Value Ref Range    Troponin <0.01 <0.01 ng/mL   EKG 12 Lead   Result Value Ref Range    Ventricular Rate 80 BPM    Atrial Rate 80 BPM    P-R Interval 146 ms    QRS Duration 72 ms    Q-T Interval 376 ms    QTc Calculation (Bazett) 433 ms    P Axis 34 degrees    R Axis 42 degrees    T Axis 42 degrees    Diagnosis       EKG performed in ER and to be interpreted by ER physician. Confirmed by MD, ER ([id number]),  Tati Oropeza ([id number]) on 4/10/2021 9:11:43 AM     RECENT VITALS:  BP: 136/86, Temp: 97.7 °F (36.5 °C), Pulse: 70,Resp: 16, SpO2: 100 %     Procedures         ED Course     Nursing Notes, Past Medical Hx, Past Surgical Hx, Social Hx, Allergies, and Family Hx were reviewed. The patient was given the followingmedications:  Orders Placed This Encounter   Medications    morphine injection 4 mg    lidocaine 4 % external patch 1 patch       CONSULTS:  None    MEDICAL DECISION MAKING / ASSESSMENT / Cristian Virk is a 53 y.o. female with PMH of Anxiety, GERD, DVT who presents with right sided chest pressure since 8AM this morning. Non-radiating. Not pleuritic or exertional. No associated symptoms. No trauma or injury to chest. No recent change in activity. Patient does work as . Physical exam reveals 50-year-old female appears in no acute distress. She has normal vitals. Lung sounds are clear. Heart rhythm and rate regular. Chest wall is tender to palpation. Abdomen soft and nontender. No peripheral edema or calf tenderness. EKG obtained revealed normal sinus rhythm without any acute ST changes. Chest x-ray revealed no acute findings. IV access was established. Labs including CBC, BMP, Troponin, BNP, Ddimer, Hepatic function panel, lipase were obtained. Labs revealed white blood cell count of 6.9, creatinine 0.6, negative troponin, negative D-dimer, BNP 50, normal LFTs and lipase. Second troponin obtained negative. Patient has a heart score of 2. With atypical chest pain that is reproducible on palpation, patient is low cardiac risk. She reports CP resolved on re-evaluation. Plan for discharge home at this time with PCP follow up. Patient given return precautions. This patient was also evaluated by the attending physician. All care plans were discussed and agreed upon. Clinical Impression     1.  Atypical chest pain        Disposition     PATIENT REFERRED TO:  Alex Wood MD  [address] [place] [address]. [address] [place] [address]  [phone]    Schedule an appointment as soon as possible for a visit       [facility]  [address] [facility]  [phone]    If symptoms worsen      DISCHARGE MEDICATIONS:  New Prescriptions    No medications on file        DISPOSITION  Discharge.        Maddie Baker PA-C  04/10/21 [id number]

## 2021-04-29 ENCOUNTER — OFFICE VISIT (OUTPATIENT)
Dept: FAMILY MEDICINE CLINIC | Age: 51
End: 2021-04-29
Payer: MEDICAID

## 2021-04-29 VITALS
DIASTOLIC BLOOD PRESSURE: 68 MMHG | RESPIRATION RATE: 16 BRPM | OXYGEN SATURATION: 98 % | HEART RATE: 69 BPM | SYSTOLIC BLOOD PRESSURE: 114 MMHG | WEIGHT: 223 LBS | BODY MASS INDEX: 35.84 KG/M2 | HEIGHT: 66 IN

## 2021-04-29 DIAGNOSIS — R22.42 LEG MASS, LEFT: ICD-10-CM

## 2021-04-29 DIAGNOSIS — E66.01 CLASS 2 SEVERE OBESITY DUE TO EXCESS CALORIES WITH SERIOUS COMORBIDITY AND BODY MASS INDEX (BMI) OF 35.0 TO 35.9 IN ADULT (HCC): ICD-10-CM

## 2021-04-29 DIAGNOSIS — M79.605 LEFT LEG PAIN: Primary | ICD-10-CM

## 2021-04-29 PROCEDURE — G8427 DOCREV CUR MEDS BY ELIG CLIN: HCPCS | Performed by: FAMILY MEDICINE

## 2021-04-29 PROCEDURE — 1036F TOBACCO NON-USER: CPT | Performed by: FAMILY MEDICINE

## 2021-04-29 PROCEDURE — 99213 OFFICE O/P EST LOW 20 MIN: CPT | Performed by: FAMILY MEDICINE

## 2021-04-29 PROCEDURE — G8417 CALC BMI ABV UP PARAM F/U: HCPCS | Performed by: FAMILY MEDICINE

## 2021-04-29 NOTE — PROGRESS NOTES
Other         Grandpa    Diabetes Other         Grandma    Seizures Sister         Belkis Brown, son       Current Outpatient Medications   Medication Sig Dispense Refill    ibuprofen (ADVIL;MOTRIN) 600 MG tablet Take 1 tablet by mouth every 6 hours as needed for Pain 20 tablet 0    Multiple Vitamins-Minerals (MULTIVITAMIN ADULT PO) Take 1 tablet by mouth daily       No current facility-administered medications for this visit. /68 (Site: Right Upper Arm, Position: Sitting, Cuff Size: Large Adult)   Pulse 69   Resp 16   Ht 5' 6\" (1.676 m)   Wt 223 lb (101.2 kg)   LMP 11/02/2016   SpO2 98%   BMI 35.99 kg/m²     Physical Exam  Musculoskeletal:      Comments: Left popliteal fossa with soft subq mass in lateral space without erythema, no focal skin irritation  Mass does not change significantly with flexion of lower leg  Mild quad TTP  No lower leg swelling         Wt Readings from Last 3 Encounters:   04/29/21 223 lb (101.2 kg)   04/10/21 225 lb (102.1 kg)   03/01/21 223 lb 6.4 oz (101.3 kg)       BP Readings from Last 3 Encounters:   04/29/21 114/68   04/10/21 106/64   03/01/21 122/68         Assessment/Plan:  1. Left leg pain  - US DUP LOWER EXTREMITY LEFT BARBARA; Future    2. Leg mass, left  - US SOFT TISSUE LIMITED AREA; Future  - US DUP LOWER EXTREMITY LEFT BARBARA; Future    3. Class 2 severe obesity due to excess calories with serious comorbidity and body mass index (BMI) of 35.0 to 35.9 in adult [facility])  - Internal Referral to Dietitian    No signs of current infection on exam  The mass in popliteal fossa is a bit more lateral than where a Baker's cyst would typically be seen, but given recent increase in size will get ultrasound to eval for this and to rule out DVT given hx of DVT and her leg discomfort. ?lipoma but given fluctuation in size this is less likely. If not improving she will f/u for more detailed imaging if needed.  Call for worsening pain

## 2021-05-03 ENCOUNTER — OFFICE VISIT (OUTPATIENT)
Dept: FAMILY MEDICINE CLINIC | Age: 51
End: 2021-05-03
Payer: MEDICAID

## 2021-05-03 VITALS
HEART RATE: 80 BPM | RESPIRATION RATE: 14 BRPM | OXYGEN SATURATION: 98 % | SYSTOLIC BLOOD PRESSURE: 106 MMHG | WEIGHT: 220.6 LBS | BODY MASS INDEX: 35.45 KG/M2 | DIASTOLIC BLOOD PRESSURE: 70 MMHG | HEIGHT: 66 IN

## 2021-05-03 DIAGNOSIS — H60.92 OTITIS EXTERNA OF LEFT EAR, UNSPECIFIED CHRONICITY, UNSPECIFIED TYPE: Primary | ICD-10-CM

## 2021-05-03 PROCEDURE — G8427 DOCREV CUR MEDS BY ELIG CLIN: HCPCS | Performed by: INTERNAL MEDICINE

## 2021-05-03 PROCEDURE — G8417 CALC BMI ABV UP PARAM F/U: HCPCS | Performed by: INTERNAL MEDICINE

## 2021-05-03 PROCEDURE — 1036F TOBACCO NON-USER: CPT | Performed by: INTERNAL MEDICINE

## 2021-05-03 PROCEDURE — 4130F TOPICAL PREP RX AOE: CPT | Performed by: INTERNAL MEDICINE

## 2021-05-03 PROCEDURE — 99213 OFFICE O/P EST LOW 20 MIN: CPT | Performed by: INTERNAL MEDICINE

## 2021-05-03 RX ORDER — CIPROFLOXACIN AND DEXAMETHASONE 3; 1 MG/ML; MG/ML
4 SUSPENSION/ DROPS AURICULAR (OTIC) 2 TIMES DAILY
Qty: 1 BOTTLE | Refills: 0 | Status: SHIPPED | OUTPATIENT
Start: 2021-05-03 | End: 2021-05-10

## 2021-05-03 ASSESSMENT — ENCOUNTER SYMPTOMS
SINUS PRESSURE: 0
SINUS PAIN: 0

## 2021-05-03 NOTE — PROGRESS NOTES
5/3/2021    Chief Complaint   Patient presents with    Otalgia     L ear pain and drainage. since friday. HPI  Went to clean ears Friday- 3 days ago  Left ear started to hurt.  2 days ago started to really hurt  Went to urgent care this am.  Was given ofloxacin   Taken ibuprofen  She flushed out the ear. It has been draining. The urgent care provider wanted her to come in anyway. Review of Systems   Constitutional: Negative for chills and fever. HENT: Negative for sinus pressure and sinus pain. Health Maintenance   Topic Date Due    Hepatitis C screen  Never done    HIV screen  Never done    COVID-19 Vaccine (1) Never done    Cervical cancer screen  2017    Lipid screen  2024    DTaP/Tdap/Td vaccine (3 - Td) 2028    Flu vaccine  Completed    Hepatitis A vaccine  Aged Out    Hepatitis B vaccine  Aged Out    Hib vaccine  Aged Out    Meningococcal (ACWY) vaccine  Aged Out    Pneumococcal 0-64 years Vaccine  Aged Out      Social History     Tobacco Use    Smoking status: Former Smoker     Years: 1.00     Types: Cigarettes     Quit date: 1987     Years since quittin.5    Smokeless tobacco: Never Used    Tobacco comment: counseled on tobacco exposure avoidance   Substance Use Topics    Alcohol use: No     Alcohol/week: 0.0 standard drinks     Frequency: Never     Binge frequency: Never    Drug use: No      Family History   Problem Relation Age of Onset    Heart Disease Father     Other Father         LILIAN    Diabetes Father     Alcohol Abuse Father     Obesity Father     Alzheimer's Disease Other         Grandparents    Breast Cancer Other         Aunt    Lung Cancer Other         Grandma    Colon Cancer Other         Grandpa    Diabetes Other         Grandma    Seizures Sister         Jasper Pederson, son     Prior to Visit Medications    Medication Sig Taking?  Authorizing Provider   Multiple Vitamins-Minerals (MULTIVITAMIN ADULT PO) Take 1 tablet by mouth daily Yes Historical Provider, MD     Patient Active Problem List   Diagnosis    Left knee pain    Low back pain--prn pain meds (nsaids)    Herniated nucleus pulposus, L5-S1    Anxiety--on ssri and seroquel with help--does not see psychiatrist currently    GERD (gastroesophageal reflux disease)--off ppi currently--s/p egd 6/16--wnl    Obstructive apnea    Acute DVT of right tibial vein ([address]- started xarelto 3/9/16==saw dr moya-(hematol)-tx x 6 mo--feels provoked by dvt--rest of w/u pending post off meds    Colon polyp--on colo 6/16--dr wilcox    Pelvic pain in female    Adnexal mass    Iron deficiency anemia    Chest pain    Hypoglycemia    Thyroid nodule    Tinea pedis    Non morbid obesity, unspecified obesity type    Localized osteoarthritis of lumbar spine    Disc degeneration, lumbar    Cellulitis and abscess of left lower extremity    Cellulitis of left leg        LABS:   Lab Results   Component Value Date    GLUCOSE 90 04/10/2021     Lab Results   Component Value Date     04/10/2021    K 4.3 04/10/2021    CREATININE 0.6 04/10/2021     Cholesterol, Total   Date Value Ref Range Status   04/27/2019 157 0 - 199 mg/dL Final     LDL Calculated   Date Value Ref Range Status   04/27/2019 95 <100 mg/dL Final     HDL   Date Value Ref Range Status   04/27/2019 52 40 - 60 mg/dL Final     Triglycerides   Date Value Ref Range Status   04/27/2019 49 0 - 150 mg/dL Final     Lab Results   Component Value Date    ALT 18 04/10/2021    AST 21 04/10/2021    ALKPHOS 123 04/10/2021    BILITOT 0.5 04/10/2021      Lab Results   Component Value Date    WBC 6.9 04/10/2021    HGB 13.3 04/10/2021    HCT 40.4 04/10/2021    MCV 87.1 04/10/2021     04/10/2021     TSH (uIU/mL)   Date Value   08/05/2015 0.94     Lab Results   Component Value Date    LABA1C 5.3 08/05/2015     No results found for: PSA, PSADIA     PHYSICAL EXAM:  /70   Pulse 80   Resp 14   Ht 5' 6\" (1.676 m)   Wt 220 lb 9.6 oz (100.1 kg)   LMP 11/02/2016   SpO2 98%   BMI 35.61 kg/m²    Physical Exam  Constitutional:       Appearance: Normal appearance. She is obese. HENT:      Head: Normocephalic and atraumatic. Right Ear: Tympanic membrane and ear canal normal.      Ears:      Comments: Left ext canal is very swollen,red  Can't see left TM    Tender left pre auricular area    Lymphadenopathy:      Cervical: No cervical adenopathy. Neurological:      Mental Status: She is alert. Psychiatric:         Mood and Affect: Mood normal.         Behavior: Behavior normal.         Thought Content: Thought content normal.         Judgment: Judgment normal.       BP Readings from Last 5 Encounters:   05/03/21 106/70   04/29/21 114/68   04/10/21 106/64   03/01/21 122/68   02/25/21 101/63       Wt Readings from Last 5 Encounters:   05/03/21 220 lb 9.6 oz (100.1 kg)   04/29/21 223 lb (101.2 kg)   04/10/21 225 lb (102.1 kg)   03/01/21 223 lb 6.4 oz (101.3 kg)   02/25/21 221 lb (100.2 kg)      Ny was seen today for otalgia.     Diagnoses and all orders for this visit:    Otitis externa of left ear, unspecified chronicity, unspecified type  -     Ira Pena MD, Otolaryngology, [facility]    Other orders  -     ciprofloxacin-dexamethasone (CIPRODEX) 0.3-0.1 % otic suspension; Place 4 drops into the left ear 2 times daily for 7 days    will have her get in with ent  Urgent care also gave her cephalexin 500mg bid for  10 days  Recommend tylenol otc for pain

## 2021-05-04 ENCOUNTER — HOSPITAL ENCOUNTER (OUTPATIENT)
Dept: VASCULAR LAB | Age: 51
Discharge: HOME | End: 2021-05-04
Payer: MEDICAID

## 2021-05-04 ENCOUNTER — HOSPITAL ENCOUNTER (OUTPATIENT)
Dept: ULTRASOUND IMAGING | Age: 51
Discharge: HOME | End: 2021-05-04
Payer: MEDICAID

## 2021-05-04 ENCOUNTER — OFFICE VISIT (OUTPATIENT)
Dept: ORTHOPEDIC SURGERY | Age: 51
End: 2021-05-04
Payer: MEDICAID

## 2021-05-04 VITALS
WEIGHT: 220 LBS | SYSTOLIC BLOOD PRESSURE: 120 MMHG | HEART RATE: 71 BPM | BODY MASS INDEX: 35.36 KG/M2 | DIASTOLIC BLOOD PRESSURE: 83 MMHG | HEIGHT: 66 IN

## 2021-05-04 DIAGNOSIS — M51.36 DISC DEGENERATION, LUMBAR: ICD-10-CM

## 2021-05-04 DIAGNOSIS — M51.27 HERNIATED NUCLEUS PULPOSUS, L5-S1: ICD-10-CM

## 2021-05-04 DIAGNOSIS — M54.50 LOW BACK PAIN, UNSPECIFIED BACK PAIN LATERALITY, UNSPECIFIED CHRONICITY, UNSPECIFIED WHETHER SCIATICA PRESENT: Primary | ICD-10-CM

## 2021-05-04 DIAGNOSIS — R22.42 LEG MASS, LEFT: ICD-10-CM

## 2021-05-04 DIAGNOSIS — M79.605 LEFT LEG PAIN: ICD-10-CM

## 2021-05-04 PROCEDURE — G8427 DOCREV CUR MEDS BY ELIG CLIN: HCPCS | Performed by: PHYSICIAN ASSISTANT

## 2021-05-04 PROCEDURE — 99214 OFFICE O/P EST MOD 30 MIN: CPT | Performed by: PHYSICIAN ASSISTANT

## 2021-05-04 PROCEDURE — 76999 ECHO EXAMINATION PROCEDURE: CPT

## 2021-05-04 PROCEDURE — 1036F TOBACCO NON-USER: CPT | Performed by: PHYSICIAN ASSISTANT

## 2021-05-04 PROCEDURE — 93971 EXTREMITY STUDY: CPT

## 2021-05-04 PROCEDURE — G8417 CALC BMI ABV UP PARAM F/U: HCPCS | Performed by: PHYSICIAN ASSISTANT

## 2021-05-04 RX ORDER — CEPHALEXIN 500 MG/1
500 CAPSULE ORAL DAILY
COMMUNITY
Start: 2021-05-03 | End: 2021-05-27 | Stop reason: ALTCHOICE

## 2021-05-04 NOTE — PROGRESS NOTES
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 Patient notes that she was doing extremely well with her current restrictions however since they have adjusted their Covid regulations regarding visitors they have increased the frequency in which the rooms need to be cleaned so it has exponentially increased her workload. She notes she was cleaning 2 to 3 g a night as a deep clean and then based cleaning all the other rooms as well as managing other cleaning duties throughout the facility. She has been increased over the past few weeks to increasing all 10 rooms every single day as a deep clean as well as maintaining her other cleaning responsibilities throughout the facility. She notes that there has been a significant increase in pain in the low back she states it is midline and radiates around but does not radiate down the buttocks. She is concerned that with the increase in her workload and they are not responding to her work restrictions that her back will continue to get worse and that something has happened since then is hopeful that we can continue to work this up and evaluate to make sure nothing is happening beyond just an increase in workload over strenuous and of her back. Denies any fall trauma or injury      Previously: 2/25/2021  Pain in the low back controlled. Work schedule with limitations of activity continuing to help her overall ability to tolerate daily schedule.                PReviously    PATIENT IS ON NO MEDICATIONS FOR HER BACK PAIN>   Difficulty with losing weight    Patient is doing better with overall back pain with current job of standing at [employer]. Occasional increase in pain overall with bending over and picking up objects. Only using tens unit at this point and some biofreeze. 7/9/2020  Patient for follow-up regarding chronic low back pain. She notes that overall she is doing better she has periods where intermittently the back will flareup where she utilizes topical medications as well as her TENS unit and rest stretching to help calm it down settle it but it has been doing well. She is continuing to do physical therapy and exercises on her own at home which are helping to improve her overall strength. She notes that now she is working the door as a  at [employer] and is standing outside [employer] as they come in and out to make sure they do not go over the allowed census within the building at one time. She notes that when she is allowed to utilize a chair stool she does much better but on the days when they do not allow her to utilize a chair or stool as she reaches the end of 4-1/2 to 5 hours she starts to notice the back start to increase in irritation and tighten up on her. Denies numbness burning tingling radiating pains down the leg notes that it is mostly a irritation and tightness across the low back. Previously: 5/28/2020    Had adjustment with improvement in pain. Pain is controlled overall in current work detail. Doing therapy with back , treadmill and stretching activites. Previously    Increased pain in the low back. 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 Back pain started as small amount of irritation with increase during the day. Previously. No chiropractic intervention. Doing better overall. Now moved to [place]. Moving well overall. 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 Previously:    Evaluation today with market increase in pain after attempted return to work. Symptoms are now more on the left side greater than the right with radiation into the buttock and down the leg    No bowel or bladder dysfunction  Limited ability to mobilize as well as discomfort with sitting and sleeping have prompted her early return to the office          Previously:  After last AYAAN she felt her pain was markedly worse to the point of inability to get out of the house one week later  sympotms were not associated with any fever, chills radiation of pain down the leg, or bowel bladder changes. Irritation of the back increased overall initially post epidural and now are decreased again. Symptoms are not better than before the ayaan however although she does state that the pain in the leg may have changed. Previously:    HISTORY OF PRESENT ILLNESS:                The patient is a 53 y.o. female initially seen in consultation by Dr Vj Jimenez for back pain here to follow-up after midline L5-S1 LESI #1 9/10/2019 for aching low back pain which she relates to a work injury April 5th. Back pain is constant. Symptoms are exacerbated by \"everything\". Some relief with resting. She denies any improvement with recent AYAAN. She now states she feels some \"tightness\" in her back. Other conservative care includes PT, naproxen, baclofen. Overall she is not improved. She denies any clear-cut lower extremity radiating pain, no progressive numbness tingling weakness. Past Medical History:   Diagnosis Date    Anxiety     Chronic back pain     Headache(784.0)     Hx of blood clots     march 2016-was on xarelto for 6months    Obstructive apnea     Seizures (HCC)     states no longer  has them-last one was in 2013-june           The pain assessment was noted & reviewed in the medical record today.      Current/Past Treatment:   · Physical Therapy: X9 visits Visit  · Chiropractic:     · Injection:   9/10/19 Midline L5/S1 interlaminar epidural injection #1--0% improved  Medications:            NSAIDS: Naprosyn            Muscle relaxer: Baclofen            Steriods:              Neuropathic medications:              Opioids:            Other:   · Surgery/Consult:    Work Status/Functionality: Off work         Past Medical History:   Diagnosis Date    Anxiety     Chronic back pain     Headache(784.0)     Hx of blood clots     march 2016-was on xarelto for 6months    Obstructive apnea     Seizures ([address]     states no longer  has them-last one was in 2013-june      Past Surgical History:     Past Surgical History:   Procedure Laterality Date    EPIDURAL STEROID INJECTION N/A 9/10/2019    MIDLINE LUMBAR FIVE SACRAL ONE EPIDURAL STEROID INJECTION SITE CONFIRMED BY FLUOROSCOPY performed by Brian Arvizu MD at [address] Right 2003    RIGHT KNEE    KNEE SURGERY Left 2012    LASIK Bilateral 2004    PARTIAL HYSTERECTOMY  11.4.16    Dr. Mireya Alvarez       Current Medications:     Current Outpatient Medications:     cephALEXin (KEFLEX) 500 MG capsule, Take 500 mg by mouth daily, Disp: , Rfl:     ciprofloxacin-dexamethasone (CIPRODEX) 0.3-0.1 % otic suspension, Place 4 drops into the left ear 2 times daily for 7 days, Disp: 1 Bottle, Rfl: 0    Multiple Vitamins-Minerals (MULTIVITAMIN ADULT PO), Take 1 tablet by mouth daily, Disp: , Rfl:   Allergies:  Latex, Bee pollen, and Lime flavor [flavoring agent]  Social History:    reports that she quit smoking about 33 years ago. Her smoking use included cigarettes. She quit after 1.00 year of use. She has never used smokeless tobacco. She reports that she does not drink alcohol or use drugs.   Family History:   Family History   Problem Relation Age of Onset    Heart Disease Father     Other Father         LILIAN    Diabetes Father     Alcohol Abuse Father     Obesity Father     Alzheimer's Disease Other         Grandparents    Breast Cancer Other         Aunt    Lung Cancer Other         Grandma    Colon Cancer Other         Grandpa    Diabetes Other         Grandma    Seizures Sister         Uncle, son       REVIEW OF SYSTEMS: Full ROS noted & scanned   CONSTITUTIONAL: Denies unexplained weight loss, fevers, chills or fatigue  NEUROLOGICAL: Denies unsteady gait or progressive weakness      PHYSICAL EXAM:    Vitals: Blood pressure 120/83, pulse 71, height 5' 6\" (1.676 m), weight 220 lb (99.8 kg), last menstrual period 11/02/2016, not currently breastfeeding. GENERAL EXAM:  · General Apparence: Patient is adequately groomed with no evidence of malnutrition. · Orientation: The patient is oriented to time, place and person. · Mood & Affect:The patient's mood and affect are appropriate   · Lymphatic: The lymphatic examination bilaterally reveals all areas to be without enlargement or induration  · Sensation: Sensation is intact without deficit  · Coordination/Balance: Good coordination     LUMBAR/SACRAL EXAMINATION:  · Inspection: Local inspection shows no step-off or bruising. Lumbar alignment is normal.  Sagittal and Coronal balance is neutral.    Some stiffness with standing and walking and notable irritability with facet testing  · Palpation:   There is evidence of tenderness at the midline to the distal lumbar spine. Tenderness bilaterally at the paraspinal and trochanters. And in the Right SI joint greater than left. ·   There is no step-off or paraspinal spasm. · Range of Motion: 40 degrees of flexion, 5 degrees extension  · Spasm of the low back with irritation in the iliosacral spine reduced compared to previous evaluation  · Rectus tightness. · Hamstring  Unable to get within 2 feet. · Sitting comfrotably.    ·   · Strength:   Strength testing is 5/5 in all muscle groups tested although some give way weakness is noted on the left side quad anterior tib and plantarflexion  ·   · Special Tests:   Straight leg raise and crossed SLR negative on the right although mildly positive on the left. Leg length and pelvis level.  0 out of 5 Minesh's signs. · Skin: There are no rashes, ulcerations or lesions. · Reflexes: Reflexes are symmetrically 2+ at the patellar and ankle tendons. Clonus absent bilaterally at the feet. · Gait & station: Normal gait  · Additional Examinations:   · RIGHT LOWER EXTREMITY: Inspection/examination of the right lower extremity does not show any tenderness, deformity or injury. Range of motion is full. There is no gross instability. There are no rashes, ulcerations or lesions. Strength and tone are normal.  ·   · LEFT LOWER EXTREMITY:  Inspection/examination of the left lower extremity does not show any tenderness, deformity or injury. Range of motion is full. There is no gross instability. There are no rashes, ulcerations or lesions. Strength and tone are normal.    Hyperlordosis of the back still present with tight rectus on testing. Pain in the lowest aspect of the lumbar spine a t the LS junction. Diagnostic Testing:     lumbar MRI showing a central disc protrusion with mild loss of height L5-S1 and moderate stenosis. 6 lumbar vertebra. 4 view x-rays of the lumbar spine AP, lateral, flexion and extension views were performed and reviewed today x-rays show mild discogenic spondylosis without acute fracture noted degenerative disc disease noted. Impression:    Lumbar djd with disc degeneration. Improved pain overall with better mobility and activtiy level.   -Increase in pain since current work status is changed and workplace has ignored restrictions. -Advised patient to continue with current work restrictions of limiting work to no lifting bending pushing and pulling beyond 20 pounds with as well as no bending squatting and kneeling.  -Gave patient order for aqua therapy for low back.   -Based upon patient's symptom recurrence with work performance extending work hours would not be advised as it would tend to aggravate patient's back more until she can continue with her current physical therapy and home therapy regiment to gain more control and strengthening across her back will maintain current work restrictions. As a strengthening occurs will steadily increase work restrictions to begin to reach an 8-hour day. -Follow-up after MRIs performed to discuss results further treatment management options   -We ordered MRI of the lumbar spine to be performed at [facility] once approved rule out nerve compression fracture. Have discussed chirpractic intervention, additional physical therapy  As needed and continued efforts at exercise program.          patient will require additional NEMS unit for muscluar stimulation in the back because of atrophy and limited activity levels at this point.

## 2021-05-06 ENCOUNTER — TELEPHONE (OUTPATIENT)
Dept: ORTHOPEDIC SURGERY | Age: 51
End: 2021-05-06

## 2021-05-06 NOTE — TELEPHONE ENCOUNTER
MINESH DAVIES / [address]  [facility] ) 06/22/2018 TO PRESENT TO [place] TO SCAN  IN MRO TO [facility] [employer] [employer]

## 2021-05-10 ENCOUNTER — OFFICE VISIT (OUTPATIENT)
Dept: ENT CLINIC | Age: 51
End: 2021-05-10
Payer: MEDICAID

## 2021-05-10 ENCOUNTER — TELEPHONE (OUTPATIENT)
Dept: ORTHOPEDIC SURGERY | Age: 51
End: 2021-05-10

## 2021-05-10 VITALS
HEIGHT: 67 IN | BODY MASS INDEX: 34.84 KG/M2 | WEIGHT: 222 LBS | DIASTOLIC BLOOD PRESSURE: 80 MMHG | TEMPERATURE: 97.1 F | HEART RATE: 77 BPM | SYSTOLIC BLOOD PRESSURE: 118 MMHG

## 2021-05-10 DIAGNOSIS — H92.02 LEFT EAR PAIN: Primary | ICD-10-CM

## 2021-05-10 DIAGNOSIS — H60.332 ACUTE SWIMMER'S EAR OF LEFT SIDE: ICD-10-CM

## 2021-05-10 DIAGNOSIS — H65.112 ACUTE MUCOID OTITIS MEDIA OF LEFT EAR: ICD-10-CM

## 2021-05-10 PROCEDURE — 1036F TOBACCO NON-USER: CPT | Performed by: OTOLARYNGOLOGY

## 2021-05-10 PROCEDURE — G8417 CALC BMI ABV UP PARAM F/U: HCPCS | Performed by: OTOLARYNGOLOGY

## 2021-05-10 PROCEDURE — G8427 DOCREV CUR MEDS BY ELIG CLIN: HCPCS | Performed by: OTOLARYNGOLOGY

## 2021-05-10 PROCEDURE — 99203 OFFICE O/P NEW LOW 30 MIN: CPT | Performed by: OTOLARYNGOLOGY

## 2021-05-10 PROCEDURE — 4130F TOPICAL PREP RX AOE: CPT | Performed by: OTOLARYNGOLOGY

## 2021-05-10 NOTE — PROGRESS NOTES
[facility]      Patient Name: Srinivas Roque Record Number:  [id number]  Primary Care Physician:  Claudia Haddad MD  Date of Consultation: 5/10/2021    Chief Complaint: Left otalgia        HISTORY OF PRESENT ILLNESS  Ny is a(n) 53 y.o. female who presents for evaluation of left ear pain. The patient says that she had a plugged ear little over a week ago. She says that she was using Q-tips to clean out her ear just before this. Became painful when she went to a clinic. They flushed her ear out there. Progressed and she was diagnosed with an otitis externa. She was started on Ciprodex drops as well as Keflex by her primary care physician. She said that the pain has significantly improved, but her ear still feels plugged. She says that she is never had anything like this. No history of swimmer's ear or ear infections as a kid. She is not diabetic. Right ear has not had an issue. REVIEW OF SYSTEMS  As above    PHYSICAL EXAM  GENERAL: No Acute Distress, Alert and Oriented, no Hoarseness, strong voice  EYES: EOMI, Anti-icteric  HENT:   Head: Normocephalic and atraumatic. Face:  Symmetric, facial nerve intact, no sinus tenderness   ears: See below  Mouth/Oral Cavity:  normal lips, Uvula is midline, no mucosal lesions, no trismus, poor dentition, normal salivary quality/flow  Oropharynx/Larynx:  normal oropharynx, normal tonsils; normal larynx/nasopharynx on mirror exam  Nose:Normal external nasal appearance. Anterior rhinoscopy shows a relatively midline septum. Large turbinates.   Normal mucosa   NECK: Normal range of motion, no thyromegaly, trachea is midline, no lymphadenopathy, no neck masses, no crepitus  CHEST: Normal respiratory effort, no retractions, breathing comfortably  SKIN: No rashes, normal appearing skin, no evidence of skin lesions/tumors  Neuro:  cranial nerve II-XII intact; normal gait  Cardio:  no edema      Procedure  Bilateral ear exam  Right ear is visualized binoculars scope. Tympanic membrane intact and aerated middle ear    Left side she had some mild lateral canal edema. The tympanic membrane was intact. There may have been a little bit of mucoid stranding. Lewis lateralized to the right side      ASSESSMENT/PLAN  1. Left ear pain  I think this patient had an otitis externa on the left side that has mostly resolved. Her pain has resolved. 2. Acute swimmer's ear of left side  Adequately treated with the Keflex and Ciprodex drops. 3. Acute mucoid otitis media of left ear  It was difficult to tell she had a middle ear effusion. She may have had a mucoid strand. She subjectively complained of hearing loss on the left side, however had a Lewis that lateralized to the right suggesting she does not have a conductive hearing loss on the left side. I told her to finish the medications. If over the next few weeks her hearing does not return to normal I would want her to follow-up with a hearing test.  Also if this recurs am happy to see her. I also explained her not to use Q-tips as this will predispose to this. I also told her if she needed her ear cleaned out I would not do the flushing as she may be sensitive to the water in her ear. I am happy to see her to clean her ears. I have performed a head and neck physical exam personally or was physically present during the key or critical portions of the service. This note was generated completely or in part utilizing Dragon dictation speech recognition software. Occasionally, words are mistranscribed and despite editing, the text may contain inaccuracies due to incorrect word recognition. If further clarification is needed please contact the office at [phone].

## 2021-05-11 ENCOUNTER — TELEPHONE (OUTPATIENT)
Dept: ORTHOPEDIC SURGERY | Age: 51
End: 2021-05-11

## 2021-05-11 NOTE — TELEPHONE ENCOUNTER
[facility] / [facility] 01/01/2019 TO PRESENT TO [place] TO SCAN IN MRO TO [facility]

## 2021-05-21 ENCOUNTER — TELEPHONE (OUTPATIENT)
Dept: ORTHOPEDIC SURGERY | Age: 51
End: 2021-05-21

## 2021-05-21 NOTE — TELEPHONE ENCOUNTER
Other Patient would like a call back. Patient states she has severe back pain and nothing has been working for her.  ph [phone]

## 2021-05-21 NOTE — TELEPHONE ENCOUNTER
I spoke with the patient and let her know that I did send a message to our pre cert department to see if they had any approval yet for her MRI. She stated she is in a lot of pain no matter what she does. I told her that Dr. Anali Groves and Nikolai Hannah are not in the office today or Monday. If needed she can go to our after hour clinics which run from 5 to 9, or go to the ED. Patient in agreement.

## 2021-05-25 ENCOUNTER — TELEPHONE (OUTPATIENT)
Dept: ORTHOPEDIC SURGERY | Age: 51
End: 2021-05-25

## 2021-05-27 ENCOUNTER — OFFICE VISIT (OUTPATIENT)
Dept: ORTHOPEDIC SURGERY | Age: 51
End: 2021-05-27
Payer: MEDICAID

## 2021-05-27 VITALS
DIASTOLIC BLOOD PRESSURE: 73 MMHG | BODY MASS INDEX: 34.84 KG/M2 | HEIGHT: 67 IN | HEART RATE: 72 BPM | WEIGHT: 222 LBS | SYSTOLIC BLOOD PRESSURE: 108 MMHG

## 2021-05-27 DIAGNOSIS — M51.27 HERNIATED NUCLEUS PULPOSUS, L5-S1: ICD-10-CM

## 2021-05-27 DIAGNOSIS — M47.816 LOCALIZED OSTEOARTHRITIS OF LUMBAR SPINE: ICD-10-CM

## 2021-05-27 DIAGNOSIS — M51.36 DISC DEGENERATION, LUMBAR: ICD-10-CM

## 2021-05-27 DIAGNOSIS — M54.50 LOW BACK PAIN, UNSPECIFIED BACK PAIN LATERALITY, UNSPECIFIED CHRONICITY, UNSPECIFIED WHETHER SCIATICA PRESENT: Primary | ICD-10-CM

## 2021-05-27 PROCEDURE — G8427 DOCREV CUR MEDS BY ELIG CLIN: HCPCS | Performed by: ORTHOPAEDIC SURGERY

## 2021-05-27 PROCEDURE — G8417 CALC BMI ABV UP PARAM F/U: HCPCS | Performed by: ORTHOPAEDIC SURGERY

## 2021-05-27 PROCEDURE — 1036F TOBACCO NON-USER: CPT | Performed by: ORTHOPAEDIC SURGERY

## 2021-05-27 PROCEDURE — 99214 OFFICE O/P EST MOD 30 MIN: CPT | Performed by: ORTHOPAEDIC SURGERY

## 2021-05-27 RX ORDER — TRAMADOL HYDROCHLORIDE 50 MG/1
50 TABLET ORAL EVERY 4 HOURS PRN
Qty: 42 TABLET | Refills: 0 | Status: SHIPPED | OUTPATIENT
Start: 2021-05-27 | End: 2021-06-03

## 2021-05-27 RX ORDER — TIZANIDINE 4 MG/1
4 TABLET ORAL NIGHTLY PRN
Qty: 30 TABLET | Refills: 0 | Status: SHIPPED | OUTPATIENT
Start: 2021-05-27 | End: 2021-06-21

## 2021-05-27 RX ORDER — PREDNISONE 10 MG/1
10 TABLET ORAL DAILY
Qty: 10 TABLET | Refills: 0 | Status: SHIPPED | OUTPATIENT
Start: 2021-05-27 | End: 2021-06-06

## 2021-05-27 NOTE — PROGRESS NOTES
FOLLOW UP: SPINE    CHIEF COMPLAINT:    Chief Complaint   Patient presents with    Back Pain     L-Spine-Recent increase of pain. Currently:    Increase in back pain in the lower lumbar with irritation of the radiation of the bilateral legs and buttock  Pain associated with increase in work related activites esepcially standing and shifting. Pain worse at the end of the day with increase in activities. Also pain in the am asociated with stiffness. No obvious weakness. Change in posture and endurance. 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 Patient notes that she was doing extremely well with her current restrictions however since they have adjusted their Covid regulations regarding visitors they have increased the frequency in which the rooms need to be cleaned so it has exponentially increased her workload. She notes she was cleaning 2 to 3 g a night as a deep clean and then based cleaning all the other rooms as well as managing other cleaning duties throughout the facility. She has been increased over the past few weeks to increasing all 10 rooms every single day as a deep clean as well as maintaining her other cleaning responsibilities throughout the facility. She notes that there has been a significant increase in pain in the low back she states it is midline and radiates around but does not radiate down the buttocks.   She is concerned that with the increase in her workload and they are not responding to her work restrictions that her back will continue to get worse and that something has happened since then is hopeful that we can continue to work this up and evaluate to make sure nothing is happening beyond just an increase in workload over strenuous and of her back. Denies any fall trauma or injury      Previously: 2/25/2021  Pain in the low back controlled. Work schedule with limitations of activity continuing to help her overall ability to tolerate daily schedule. PReviously    PATIENT IS ON NO MEDICATIONS FOR HER BACK PAIN>   Difficulty with losing weight    Patient is doing better with overall back pain with current job of standing at [employer]. Occasional increase in pain overall with bending over and picking up objects. Only using tens unit at this point and some biofreeze. 7/9/2020  Patient for follow-up regarding chronic low back pain. She notes that overall she is doing better she has periods where intermittently the back will flareup where she utilizes topical medications as well as her TENS unit and rest stretching to help calm it down settle it but it has been doing well. She is continuing to do physical therapy and exercises on her own at home which are helping to improve her overall strength. She notes that now she is working the door as a  at [facility] and is standing outside [employer] as they come in and out to make sure they do not go over the allowed census within the building at one time. She notes that when she is allowed to utilize a chair stool she does much better but on the days when they do not allow her to utilize a chair or stool as she reaches the end of 4-1/2 to 5 hours she starts to notice the back start to increase in irritation and tighten up on her. Denies numbness burning tingling radiating pains down the leg notes that it is mostly a irritation and tightness across the low back. Previously: 5/28/2020    Had adjustment with improvement in pain. Pain is controlled overall in current work detail.    Doing therapy with back , treadmill and stretching activites. Previously    Increased pain in the low back. 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 Back pain started as small amount of irritation with increase during the day. Previously. No chiropractic intervention. Doing better overall. Now moved to [place]. Moving well overall. 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 Pending job breakfast at [facility] and [employer]. Previously:    Evaluation today with market increase in pain after attempted return to work. Symptoms are now more on the left side greater than the right with radiation into the buttock and down the leg    No bowel or bladder dysfunction  Limited ability to mobilize as well as discomfort with sitting and sleeping have prompted her early return to the office          Previously:  After last AYAAN she felt her pain was markedly worse to the point of inability to get out of the house one week later  sympotms were not associated with any fever, chills radiation of pain down the leg, or bowel bladder changes. Irritation of the back increased overall initially post epidural and now are decreased again. Symptoms are not better than before the ayaan however although she does state that the pain in the leg may have changed. Previously:    HISTORY OF PRESENT ILLNESS:                The patient is a 53 y.o. female initially seen in consultation by Dr Vj Jimenez for back pain here to follow-up after midline L5-S1 LESI #1 9/10/2019 for aching low back pain which she relates to a work injury April 5th. Back pain is constant. Symptoms are exacerbated by \"everything\". Some relief with resting. She denies any improvement with recent AYAAN. She now states she feels some \"tightness\" in her back. Other conservative care includes PT, naproxen, baclofen. Overall she is not improved.   She denies any clear-cut lower extremity radiating pain, no progressive numbness tingling weakness. Past Medical History:   Diagnosis Date    Anxiety     Chronic back pain     Headache(784.0)     Hx of blood clots     march 2016-was on xarelto for 6months    Obstructive apnea     Seizures (HCC)     states no longer  has them-last one was in 2013-june           The pain assessment was noted & reviewed in the medical record today. Current/Past Treatment:   · Physical Therapy: X9 visits Visit  · Chiropractic:     · Injection:   9/10/19 Midline L5/S1 interlaminar epidural injection #1--0% improved  Medications:            NSAIDS: Naprosyn            Muscle relaxer: Baclofen            Steriods:              Neuropathic medications:              Opioids:            Other:   · Surgery/Consult:    Work Status/Functionality: Off work         Past Medical History:   Diagnosis Date    Anxiety     Chronic back pain     Headache(784.0)     Hx of blood clots     march 2016-was on xarelto for 6months    Obstructive apnea     Seizures ([address]     states no longer  has them-last one was in 2013-june      Past Surgical History:     Past Surgical History:   Procedure Laterality Date    EPIDURAL STEROID INJECTION N/A 9/10/2019    MIDLINE LUMBAR FIVE SACRAL ONE EPIDURAL STEROID INJECTION SITE CONFIRMED BY FLUOROSCOPY performed by Ira Andrade MD at [address] [place] [address] Right 2003    RIGHT KNEE    KNEE SURGERY Left 2012    LASIK Bilateral 2004    PARTIAL HYSTERECTOMY  11.4.16    Dr. July Quijano       Current Medications:     Current Outpatient Medications:     Multiple Vitamins-Minerals (MULTIVITAMIN ADULT PO), Take 1 tablet by mouth daily, Disp: , Rfl:   Allergies:  Latex, Bee pollen, and Lime flavor [flavoring agent]  Social History:    reports that she quit smoking about 33 years ago. Her smoking use included cigarettes. She quit after 1.00 year of use.  She has never used smokeless tobacco. She reports that she does not drink alcohol and does not use drugs. Family History:   Family History   Problem Relation Age of Onset    Heart Disease Father     Other Father         LILIAN    Diabetes Father     Alcohol Abuse Father     Obesity Father     Alzheimer's Disease Other         Grandparents    Breast Cancer Other         Aunt    Lung Cancer Other         Grandma    Colon Cancer Other         Grandpa    Diabetes Other         Grandma    Seizures Sister         Uncle, son       REVIEW OF SYSTEMS: Full ROS noted & scanned   CONSTITUTIONAL: Denies unexplained weight loss, fevers, chills or fatigue  NEUROLOGICAL: Denies unsteady gait or progressive weakness      PHYSICAL EXAM:    Vitals: Blood pressure 108/73, pulse 72, height 5' 7\" (1.702 m), weight 222 lb (100.7 kg), last menstrual period 11/02/2016, not currently breastfeeding. GENERAL EXAM:  · General Apparence: Patient is adequately groomed with no evidence of malnutrition. · Orientation: The patient is oriented to time, place and person. · Mood & Affect:The patient's mood and affect are appropriate   · Lymphatic: The lymphatic examination bilaterally reveals all areas to be without enlargement or induration  · Sensation: Sensation is intact without deficit  · Coordination/Balance: Good coordination     LUMBAR/SACRAL EXAMINATION:  · Inspection: Local inspection shows no step-off or bruising. Lumbar alignment is normal.  Sagittal and Coronal balance is now displaced with increase in lordosis as well as kyphotic compensation and pelvic tilt. Rectus tightness noted. Some stiffness with standing and walking and notable irritability with facet testing  · Palpation:   There is evidence of tenderness at the midline to the distal lumbar spine. Tenderness bilaterally at the paraspinal and trochanters. And in the Right SI joint greater than left. ·   There is no step-off or paraspinal spasm.    · Range of Motion: 40 degrees of flexion, 5 degrees extension  · Spasm of the low back with irritation in the iliosacral spine reduced compared to previous evaluation  · Rectus tightness. · Hamstring  Unable to get within 2 feet. · Sitting comfrotably. ·   · Strength:   Strength testing is 5/5 in all muscle groups tested although some give way weakness is noted on the left side quad anterior tib and plantarflexion  ·   · Special Tests:   Straight leg raise and crossed SLR negative on the right although mildly positive on the left. Leg length and pelvis level.  0 out of 5 Minesh's signs. · Skin: There are no rashes, ulcerations or lesions. · Reflexes: Reflexes are symmetrically 2+ at the patellar and ankle tendons. Clonus absent bilaterally at the feet. · Gait & station: Normal gait  · Additional Examinations:   · RIGHT LOWER EXTREMITY: Inspection/examination of the right lower extremity does not show any tenderness, deformity or injury. Range of motion is full. There is no gross instability. There are no rashes, ulcerations or lesions. Strength and tone are normal.  ·   · LEFT LOWER EXTREMITY:  Inspection/examination of the left lower extremity does not show any tenderness, deformity or injury. Range of motion is full. There is no gross instability. There are no rashes, ulcerations or lesions. Strength and tone are normal.    Hyperlordosis of the back still present with tight rectus on testing. Pain in the lowest aspect of the lumbar spine a t the LS junction. Diagnostic Testing:     lumbar MRI showing a central disc protrusion with mild loss of height L5-S1 and moderate stenosis. 6 lumbar vertebra. 4 view x-rays of the lumbar spine AP, lateral, flexion and extension views were performed and reviewed today x-rays show mild discogenic spondylosis without acute fracture noted degenerative disc disease noted. Impression:    Lumbar djd with disc degeneration.    Improved pain overall with better mobility and activtiy level.   -Increase in pain since current work status is changed and workplace has ignored restrictions. -Advised patient to continue with current work restrictions of limiting work to no lifting bending pushing and pulling beyond 20 pounds with as well as no bending squatting and kneeling.  -Gave patient order for aqua therapy for low back.      -Follow-up after MRIs performed to discuss results further treatment management options   -We ordered MRI of the lumbar spine to be performed at [facility] once approved rule out nerve compression fracture. Have discussed chirpractic intervention, additional physical therapy  As needed and continued efforts at exercise program.      Started on regimen of pain medications, steroids, muscle relaxants to increase contorl of the back and return to modified but progressive work schedule. patient will require additional NEMS unit for muscluar stimulation in the back because of atrophy and limited activity levels at this point.

## 2021-06-03 ENCOUNTER — HOSPITAL ENCOUNTER (OUTPATIENT)
Dept: MRI IMAGING | Age: 51
Discharge: HOME | End: 2021-06-03
Payer: MEDICAID

## 2021-06-03 DIAGNOSIS — M51.27 HERNIATED NUCLEUS PULPOSUS, L5-S1: ICD-10-CM

## 2021-06-03 DIAGNOSIS — M51.36 DISC DEGENERATION, LUMBAR: ICD-10-CM

## 2021-06-03 DIAGNOSIS — M54.50 LOW BACK PAIN, UNSPECIFIED BACK PAIN LATERALITY, UNSPECIFIED CHRONICITY, UNSPECIFIED WHETHER SCIATICA PRESENT: ICD-10-CM

## 2021-06-03 PROCEDURE — 72148 MRI LUMBAR SPINE W/O DYE: CPT

## 2021-06-04 ENCOUNTER — OFFICE VISIT (OUTPATIENT)
Dept: ORTHOPEDIC SURGERY | Age: 51
End: 2021-06-04
Payer: MEDICAID

## 2021-06-04 VITALS — HEIGHT: 67 IN | WEIGHT: 222 LBS | BODY MASS INDEX: 34.84 KG/M2

## 2021-06-04 DIAGNOSIS — M51.27 HERNIATED NUCLEUS PULPOSUS, L5-S1: ICD-10-CM

## 2021-06-04 DIAGNOSIS — M54.50 LOW BACK PAIN, UNSPECIFIED BACK PAIN LATERALITY, UNSPECIFIED CHRONICITY, UNSPECIFIED WHETHER SCIATICA PRESENT: ICD-10-CM

## 2021-06-04 DIAGNOSIS — M48.061 NEURAL FORAMINAL STENOSIS OF LUMBAR SPINE: Primary | ICD-10-CM

## 2021-06-04 DIAGNOSIS — M51.36 DISC DEGENERATION, LUMBAR: ICD-10-CM

## 2021-06-04 PROCEDURE — 99213 OFFICE O/P EST LOW 20 MIN: CPT | Performed by: PHYSICIAN ASSISTANT

## 2021-06-04 PROCEDURE — G8417 CALC BMI ABV UP PARAM F/U: HCPCS | Performed by: PHYSICIAN ASSISTANT

## 2021-06-04 PROCEDURE — 1036F TOBACCO NON-USER: CPT | Performed by: PHYSICIAN ASSISTANT

## 2021-06-04 PROCEDURE — G8427 DOCREV CUR MEDS BY ELIG CLIN: HCPCS | Performed by: PHYSICIAN ASSISTANT

## 2021-06-04 NOTE — PROGRESS NOTES
New Patient: LUMBAR SPINE    Referring Provider:  No ref. provider found    CHIEF COMPLAINT:    Chief Complaint   Patient presents with    New Patient     Lumbar spine pain       HISTORY OF PRESENT ILLNESS:    Ms. Tati Vu  is a pleasant 53 y.o. female here for consultation regarding her LBP pain. She states her pain began around 1990's and got worse in 2019. On average she rates her pain 4/10 and describes it as an intermittent stabbing pain. 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 She saw an orthopedist who placed her on work restrictions along with other conservative therapy while slowly increasing her hours back to her normal schedule. This other conservative therapy included: PT, TENS unit, lidocaine patches and epidural injections which the patient states did not improve her symptoms. She did experience some improvement with adjustments from a chiropractor as well as oral medication. Physical therapy including aquatic therapy also improved her condition around March of 2020. She did return to normal working hours of being on her feet for long durations of time and having to conduct her cleaning duties once again exacerbated her back pain. She has since changed jobs and is now in a position where she is sitting for majority of her workday. The orthopedic she recently saw prescribed prednisone, tramadol, and tizanidine. She started aquatic physical therapy approximately 1 week ago. She is here today for further evaluation and coordination of care. Patient notes the pain radiates into the buttock. She denies any numbness, weakness, or paresthesias. She denies any bowel or bladder dysfunction. She occasionally wakes up at night from pain.         Current/Past Treatment:   · Physical Therapy: yes including aquatic therapy  · Chiropractic: Yes  · Injection: Yes but she feels that this worsened her back pain  · Medications:    · NSAIDS: OTC meds  · Muscle relaxer:  NONE  · Steriods:  Prednisone  · Neuropathic medications:  NONE  · Opioids: Ultram  · Other: NONE  ·  Surgery/Consult NONE    Past Medical History:   Past Medical History:   Diagnosis Date    Anxiety     Chronic back pain     Headache(784.0)     Hx of blood clots     march 2016-was on xarelto for 6months    Obstructive apnea     Seizures ([address]     states no longer  has them-last one was in 2013-june      Past Surgical History:     Past Surgical History:   Procedure Laterality Date    EPIDURAL STEROID INJECTION N/A 9/10/2019    MIDLINE LUMBAR FIVE SACRAL ONE EPIDURAL STEROID INJECTION SITE CONFIRMED BY FLUOROSCOPY performed by Franz Hashimoto, MD at [address] Right 2003    RIGHT KNEE    KNEE SURGERY Left 2012    LASIK Bilateral 2004    PARTIAL HYSTERECTOMY  11.4.16    Dr. Anaid Pemberton       Current Medications:     Current Outpatient Medications:     tiZANidine (ZANAFLEX) 4 MG tablet, Take 1 tablet by mouth nightly as needed (SPASM), Disp: 30 tablet, Rfl: 0    predniSONE (DELTASONE) 10 MG tablet, Take 1 tablet by mouth daily for 10 days, Disp: 10 tablet, Rfl: 0    Multiple Vitamins-Minerals (MULTIVITAMIN ADULT PO), Take 1 tablet by mouth daily, Disp: , Rfl:   Allergies:  Latex, Bee pollen, and Lime flavor [flavoring agent]  Social History:    reports that she quit smoking about 33 years ago. Her smoking use included cigarettes. She quit after 1.00 year of use. She has never used smokeless tobacco. She reports that she does not drink alcohol and does not use drugs.   Family History:   Family History   Problem Relation Age of Onset    Heart Disease Father     Other Father         LILIAN    Diabetes Father     Alcohol Abuse Father     Obesity Father     Alzheimer's Disease Other         Grandparents    Breast Cancer Other         Aunt    Lung Cancer Other         Grandma    Colon Cancer Other         Grandpa    Diabetes Other         Grandma    Seizures Sister         Uncle, son       REVIEW OF SYSTEMS: Full ROS noted & scanned   CONSTITUTIONAL: Denies unexplained weight loss, fevers, chills or fatigue  NEUROLOGICAL: Denies unsteady gait or progressive weakness  MUSCULOSKELETAL: Denies joint swelling or redness  PSYCHOLOGICAL: Denies anxiety, depression   SKIN: Denies skin changes, delayed healing, rash, itching   HEMATOLOGIC: Denies easy bleeding or bruising  ENDOCRINE: Denies excessive thirst, urination, heat/cold  RESPIRATORY: Denies current dyspnea, cough  GI: Denies nausea, vomiting, diarrhea   : Denies bowel or bladder issues      PHYSICAL EXAM:    Vitals: Height 5' 7\" (1.702 m), weight 222 lb (100.7 kg), last menstrual period 11/02/2016, not currently breastfeeding. GENERAL EXAM:  · General Apparence: Patient is adequately groomed with no evidence of malnutrition. · Orientation: The patient is oriented to time, place and person. · Mood & Affect:The patient's mood and affect are appropriate. · Vascular: Examination reveals no swelling tenderness in upper or lower extremities. Good capillary refill. · Lymphatic: The lymphatic examination bilaterally reveals all areas to be without enlargement or induration  · Sensation: Sensation is intact without deficit  · Coordination/Balance: Good coordination       LUMBAR/SACRAL EXAMINATION:  · Inspection: Local inspection shows no step-off or bruising. Lumbar alignment is normal.  Sagittal and Coronal balance is neutral.  Patient sitting comfortably in her chair. · Palpation: Tenderness at midline and distal lumbar spine from approximately T11-S1. Palpable muscle spasms to the paraspinal muscles of the lumbar spine. There is no step-off. · Range of Motion: 50 degrees of flexion, 10 degrees extension. 10 left and right rotation.  Pain at all end ranges  · Strength:   Strength testing is 4-/5 in all muscle groups tested. · Special Tests:   Straight leg raise and crossed SLR negative. Slump test negative,    · Skin: There are no rashes, ulcerations or lesions. · Reflexes: Reflexes are symmetrically 2+ at the patellar and ankle tendons. Clonus absent bilaterally at the feet. · Gait & station: Antalgic, patient ambulates without assistance or assistive device  · Additional Examinations:   · RIGHT LOWER EXTREMITY: Inspection/examination of the right lower extremity does not show any tenderness, deformity or injury. Range of motion is unremarkable. There is no gross instability. There are no rashes, ulcerations or lesions. Strength and tone are normal.  · LEFT LOWER EXTREMITY:  Inspection/examination of the left lower extremity does not show any tenderness, deformity or injury. Range of motion is unremarkable. There is no gross instability. There are no rashes, ulcerations or lesions. Strength and tone are normal.    Diagnostic Testing:    Narrative   EXAMINATION:   MRI OF THE LUMBAR SPINE WITHOUT CONTRAST, 6/3/2021 7:45 am       TECHNIQUE:   Multiplanar multisequence MRI of the lumbar spine was performed without the   administration of intravenous contrast.       COMPARISON:   08/29/2019.       HISTORY:   ORDERING SYSTEM PROVIDED HISTORY: Low back pain, unspecified back pain   laterality, unspecified chronicity, unspecified whether sciatica present   TECHNOLOGIST PROVIDED HISTORY:   Reason for exam:->rule out nerve compression or fracture   Is the patient pregnant?->No   Reason for Exam: Patient is here for follow-up regarding low back pain with   degenerative disc disease. She notes that there has been a significant   increase in pain in the low back she states it is midline and radiates around   but does not radiate down the buttocks.    Acuity: Acute   Type of Exam: Initial       FINDINGS:   BONES/ALIGNMENT: The vertebral body heights appear maintained.  There is   slight straightening of the normal lumbar lordosis.  No significant   spondylolisthesis.  There is disc desiccation with loss of disc space height   at L5-S1.  The bone marrow signal demonstrates no acute abnormality.       SPINAL CORD: The conus terminates at a normal level.       SOFT TISSUES: No paraspinal mass identified.       L1-L2: There is no significant disc bulge, spinal canal stenosis or neural   foraminal narrowing.       L2-L3: There is no significant disc bulge, spinal canal stenosis or neural   foraminal narrowing.       L3-L4: There is no significant disc bulge, spinal canal stenosis or neural   foraminal narrowing.  Minimal bilateral facet arthrosis.       L4-L5: There is a disc bulge contacting the ventral thecal sac.  No   significant spinal canal stenosis.  Facet arthrosis contributes to minimal   bilateral neural foraminal narrowing.  Findings are slightly progressed.       L5-S1: There is a disc bulge with a central annular tear, which appears   slightly improved compared the prior exam.  There is no significant spinal   canal stenosis.  Facet arthrosis contributes to mild bilateral neural   foraminal narrowing, which may be minimally progressed.           Impression   1. No significant spinal canal stenosis. 2. Neural foraminal narrowing at L4-L5 and L5-S1.   3. Disc desiccation with slight loss of disc space height at L5-S1.             Impression:    Diagnosis Orders   1. Neural foraminal stenosis of lumbar spine  ROCÍO Dunbar MD, Pain Management, [facility]   2. Herniated nucleus pulposus, L5-S1  [facility]   3. Disc degeneration, lumbar  ROCÍO Dunbar MD, Pain Management, [facility]   4. Low back pain, unspecified back pain laterality, unspecified chronicity, unspecified whether sciatica present  Nahum Manrique MD, Pain Management, [facility]         Plan:    Above diagnoses are Worsening    1.  Medications: No further recommendations for new medications. 2. PT:  Encouraged to continue with HEP. Patient is also undergoing aquatic therapy which she began 1 week ago. 3. Further studies:  MRI imaging already obtained as noted above. No further imaging currently planned. 4. Interventional:  We discussed pursuing a possible transforaminal epidural steroid injection to address the pain. Radiologic imaging and symptoms confirm the pain etiology. Risks, benefits and alternatives of interventional options were discussed. These include and are not limited to bleeding, infection, spinal headache, nerve injury, increased pain and lack of pain relief. The patient verbalized understanding and would like to proceed. The patient will be scheduled accordingly.                   Latesha Choi PA-C    Physician Assistant - Certified  [facility] and [address] [place] [address]    06/05/21 3:18 PM

## 2021-06-10 ENCOUNTER — OFFICE VISIT (OUTPATIENT)
Dept: ORTHOPEDIC SURGERY | Age: 51
End: 2021-06-10
Payer: MEDICAID

## 2021-06-10 VITALS
HEIGHT: 67 IN | SYSTOLIC BLOOD PRESSURE: 111 MMHG | WEIGHT: 222 LBS | DIASTOLIC BLOOD PRESSURE: 73 MMHG | HEART RATE: 79 BPM | BODY MASS INDEX: 34.84 KG/M2

## 2021-06-10 DIAGNOSIS — M48.061 NEURAL FORAMINAL STENOSIS OF LUMBAR SPINE: ICD-10-CM

## 2021-06-10 DIAGNOSIS — M51.27 HERNIATED NUCLEUS PULPOSUS, L5-S1: ICD-10-CM

## 2021-06-10 DIAGNOSIS — M54.50 LOW BACK PAIN, UNSPECIFIED BACK PAIN LATERALITY, UNSPECIFIED CHRONICITY, UNSPECIFIED WHETHER SCIATICA PRESENT: Primary | ICD-10-CM

## 2021-06-10 DIAGNOSIS — M51.36 DISC DEGENERATION, LUMBAR: ICD-10-CM

## 2021-06-10 PROCEDURE — 1036F TOBACCO NON-USER: CPT | Performed by: ORTHOPAEDIC SURGERY

## 2021-06-10 PROCEDURE — G8417 CALC BMI ABV UP PARAM F/U: HCPCS | Performed by: ORTHOPAEDIC SURGERY

## 2021-06-10 PROCEDURE — G8427 DOCREV CUR MEDS BY ELIG CLIN: HCPCS | Performed by: ORTHOPAEDIC SURGERY

## 2021-06-10 PROCEDURE — 99214 OFFICE O/P EST MOD 30 MIN: CPT | Performed by: ORTHOPAEDIC SURGERY

## 2021-06-10 NOTE — PROGRESS NOTES
FOLLOW UP: SPINE    CHIEF COMPLAINT:    Chief Complaint   Patient presents with    Back Pain     L-SPINE-MRI RESULTS     Currently:    Episode of back pain with radiation to the right buttock and parasthesias yesterday. Pending appointment with  In pain management group. Dr. Sebastian Lopez. Aquatic therapy with increase in pain in the lower back on the right side. In sitting position at work at [employer]. MRI was evaluated with her and we have noted a single disc disease at L5-S1 with notable prominence on the right foramen of disc material and compression        Increase in back pain in the lower lumbar with irritation of the radiation of the bilateral legs and buttock  Pain associated with increase in work related activites esepcially standing and shifting. Pain worse at the end of the day with increase in activities. Also pain in the am asociated with stiffness. No obvious weakness. Change in posture and endurance. 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 Patient notes that she was doing extremely well with her current restrictions however since they have adjusted their Covid regulations regarding visitors they have increased the frequency in which the rooms need to be cleaned so it has exponentially increased her workload. She notes she was cleaning 2 to 3 g a night as a deep clean and then based cleaning all the other rooms as well as managing other cleaning duties throughout the facility.   She has been increased over the past few weeks to increasing all 10 rooms every single day as a deep clean as well as maintaining her other cleaning responsibilities throughout the facility. She notes that there has been a significant increase in pain in the low back she states it is midline and radiates around but does not radiate down the buttocks. She is concerned that with the increase in her workload and they are not responding to her work restrictions that her back will continue to get worse and that something has happened since then is hopeful that we can continue to work this up and evaluate to make sure nothing is happening beyond just an increase in workload over strenuous and of her back. Denies any fall trauma or injury      Previously: 2/25/2021  Pain in the low back controlled. Work schedule with limitations of activity continuing to help her overall ability to tolerate daily schedule. PReviously    PATIENT IS ON NO MEDICATIONS FOR HER BACK PAIN>   Difficulty with losing weight    Patient is doing better with overall back pain with current job of standing at [employer]. Occasional increase in pain overall with bending over and picking up objects. Only using tens unit at this point and some biofreeze. 7/9/2020  Patient for follow-up regarding chronic low back pain. She notes that overall she is doing better she has periods where intermittently the back will flareup where she utilizes topical medications as well as her TENS unit and rest stretching to help calm it down settle it but it has been doing well. She is continuing to do physical therapy and exercises on her own at home which are helping to improve her overall strength. She notes that now she is working the door as a  at [facility] and is standing outside [employer] as they come in and out to make sure they do not go over the allowed census within the building at one time.   She notes that when she is allowed to utilize a chair stool she does much better but on the days when they do not allow her to utilize a chair or stool as she reaches the end of 4-1/2 to 5 hours she starts to notice the back start to increase in irritation and tighten up on her. Denies numbness burning tingling radiating pains down the leg notes that it is mostly a irritation and tightness across the low back. Previously: 5/28/2020    Had adjustment with improvement in pain. Pain is controlled overall in current work detail. Doing therapy with back , treadmill and stretching activites. Previously    Increased pain in the low back. 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 Back pain started as small amount of irritation with increase during the day. Previously. No chiropractic intervention. Doing better overall. Now moved to [employer]. Moving well overall. 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 Pending job breakfast at [employer] and [employer]. Previously:    Evaluation today with market increase in pain after attempted return to work. Symptoms are now more on the left side greater than the right with radiation into the buttock and down the leg    No bowel or bladder dysfunction  Limited ability to mobilize as well as discomfort with sitting and sleeping have prompted her early return to the office          Previously:  After last AYAAN she felt her pain was markedly worse to the point of inability to get out of the house one week later  sympotms were not associated with any fever, chills radiation of pain down the leg, or bowel bladder changes. Irritation of the back increased overall initially post epidural and now are decreased again. Symptoms are not better than before the ayaan however although she does state that the pain in the leg may have changed.          Previously:    HISTORY OF PRESENT ILLNESS:                The patient is a 53 y.o. female initially seen in consultation by Dr Joseline Boyd for back pain here to follow-up after midline L5-S1 LESI #1 9/10/2019 for aching low back pain which she relates to a work injury April 5th. Back pain is constant. Symptoms are exacerbated by \"everything\". Some relief with resting. She denies any improvement with recent JESSY. She now states she feels some \"tightness\" in her back. Other conservative care includes PT, naproxen, baclofen. Overall she is not improved. She denies any clear-cut lower extremity radiating pain, no progressive numbness tingling weakness. Past Medical History:   Diagnosis Date    Anxiety     Chronic back pain     Headache(784.0)     Hx of blood clots     march 2016-was on xarelto for 6months    Obstructive apnea     Seizures (HCC)     states no longer  has them-last one was in 2013-june           The pain assessment was noted & reviewed in the medical record today.      Current/Past Treatment:   · Physical Therapy: X9 visits Visit  · Chiropractic:     · Injection:   9/10/19 Midline L5/S1 interlaminar epidural injection #1--0% improved  Medications:            NSAIDS: Naprosyn            Muscle relaxer: Baclofen            Steriods:              Neuropathic medications:              Opioids:            Other:   · Surgery/Consult:    Work Status/Functionality: Off work         Past Medical History:   Diagnosis Date    Anxiety     Chronic back pain     Headache(784.0)     Hx of blood clots     march 2016-was on xarelto for 6months    Obstructive apnea     Seizures ([facility] [address]     states no longer  has them-last one was in 2013-june      Past Surgical History:     Past Surgical History:   Procedure Laterality Date    EPIDURAL STEROID INJECTION N/A 9/10/2019    MIDLINE LUMBAR FIVE SACRAL ONE EPIDURAL STEROID INJECTION SITE CONFIRMED BY FLUOROSCOPY performed by Brittnee Perez MD at [address] Right 2003    RIGHT KNEE    KNEE SURGERY Left 2012    LASIK Bilateral 2004    PARTIAL HYSTERECTOMY  11.4.16    Dr. Melissa Granda       Current Medications:     Current Outpatient Medications:     tiZANidine (ZANAFLEX) 4 MG tablet, Take 1 tablet by mouth nightly as needed (SPASM), Disp: 30 tablet, Rfl: 0    Multiple Vitamins-Minerals (MULTIVITAMIN ADULT PO), Take 1 tablet by mouth daily, Disp: , Rfl:   Allergies:  Latex, Bee pollen, and Lime flavor [flavoring agent]  Social History:    reports that she quit smoking about 33 years ago. Her smoking use included cigarettes. She quit after 1.00 year of use. She has never used smokeless tobacco. She reports that she does not drink alcohol and does not use drugs. Family History:   Family History   Problem Relation Age of Onset    Heart Disease Father     Other Father         LILIAN    Diabetes Father     Alcohol Abuse Father     Obesity Father     Alzheimer's Disease Other         Grandparents    Breast Cancer Other         Aunt    Lung Cancer Other         Grandma    Colon Cancer Other         Grandpa    Diabetes Other         Grandma    Seizures Sister         Heriberto Webb, son       REVIEW OF SYSTEMS: Full ROS noted & scanned   CONSTITUTIONAL: Denies unexplained weight loss, fevers, chills or fatigue  NEUROLOGICAL: Denies unsteady gait or progressive weakness      PHYSICAL EXAM:    Vitals: Blood pressure 111/73, pulse 79, height 5' 7\" (1.702 m), weight 222 lb (100.7 kg), last menstrual period 11/02/2016, not currently breastfeeding. GENERAL EXAM:  · General Apparence: Patient is adequately groomed with no evidence of malnutrition. · Orientation: The patient is oriented to time, place and person. · Mood & Affect:The patient's mood and affect are appropriate   · Lymphatic: The lymphatic examination bilaterally reveals all areas to be without enlargement or induration  · Sensation: Sensation is intact without deficit  · Coordination/Balance: Good coordination     LUMBAR/SACRAL EXAMINATION:  · Inspection: Local inspection shows no step-off or bruising.   Lumbar alignment is normal.  Sagittal and Coronal balance is now displaced with increase in lordosis as well as kyphotic compensation and pelvic tilt. Rectus tightness noted. Some stiffness with standing and walking and notable irritability with facet testing  · Palpation:   There is evidence of tenderness at the midline to the distal lumbar spine. Tenderness bilaterally at the paraspinal and trochanters. And in the Right SI joint greater than left. ·   There is no step-off or paraspinal spasm. · Range of Motion: 40 degrees of flexion, 5 degrees extension  · Spasm of the low back with irritation in the iliosacral spine reduced compared to previous evaluation  · Rectus tightness. · Hamstring  Unable to get within 2 feet. · Sitting comfrotably. ·   · Strength:   Strength testing is 5/5 in all muscle groups tested although some give way weakness is noted on the left side quad anterior tib and plantarflexion  ·   · Special Tests:   Straight leg raise and crossed SLR negative on the right although mildly positive on the left. Leg length and pelvis level.  0 out of 5 Minesh's signs. · Skin: There are no rashes, ulcerations or lesions. · Reflexes: Reflexes are symmetrically 2+ at the patellar and ankle tendons. Clonus absent bilaterally at the feet. · Gait & station: Normal gait  · Additional Examinations:   · RIGHT LOWER EXTREMITY: Inspection/examination of the right lower extremity does not show any tenderness, deformity or injury. Range of motion is full. There is no gross instability. There are no rashes, ulcerations or lesions. Strength and tone are normal.  ·   · LEFT LOWER EXTREMITY:  Inspection/examination of the left lower extremity does not show any tenderness, deformity or injury. Range of motion is full. There is no gross instability. There are no rashes, ulcerations or lesions. Strength and tone are normal.    Hyperlordosis of the back still present with tight rectus on testing. Pain in the lowest aspect of the lumbar spine a t the LS junction. Diagnostic Testing:     lumbar MRI showing a central disc protrusion with mild loss of height L5-S1 and moderate stenosis. 6 lumbar vertebra. 4 view x-rays of the lumbar spine AP, lateral, flexion and extension views were performed and reviewed today x-rays show mild discogenic spondylosis without acute fracture noted degenerative disc disease noted. Impression:    Lumbar djd with disc degeneration.      -Increase in pain since current work status is changed    -Advised patient to continue with current work restrictions of limiting work to no lifting bending pushing and pulling beyond 20 pounds with as well as no bending squatting and kneeling.  -Gave patient order for aqua therapy for low back. Have discussed chirpractic intervention, additional physical therapy  As needed and continued efforts at exercise program.          patient will require additional NEMS unit for muscluar stimulation in the back because of atrophy and limited activity levels at this point. At this point she is going to have most of her follow-up at the back and spine clinic through [facility]. Pending neurosurgery evaluations for local discectomy and potentially injection of the facet versus selective nerve block.   Her previous epidural had caused her marked increase in pain hopefully she can discuss this with Dr. Liseth Cunningham and proceed accordingly

## 2021-06-14 ENCOUNTER — TELEPHONE (OUTPATIENT)
Dept: ORTHOPEDIC SURGERY | Age: 51
End: 2021-06-14

## 2021-06-14 NOTE — TELEPHONE ENCOUNTER
GAVE [facility] MEDICAL RECORDS  06/22/2018 TO PRESENT TO [place] TO SCAN IN MRO TO [facility]

## 2021-06-18 ENCOUNTER — OFFICE VISIT (OUTPATIENT)
Dept: FAMILY MEDICINE CLINIC | Age: 51
End: 2021-06-18
Payer: MEDICAID

## 2021-06-18 VITALS
DIASTOLIC BLOOD PRESSURE: 74 MMHG | RESPIRATION RATE: 16 BRPM | HEART RATE: 63 BPM | WEIGHT: 223 LBS | HEIGHT: 67 IN | SYSTOLIC BLOOD PRESSURE: 118 MMHG | OXYGEN SATURATION: 99 % | BODY MASS INDEX: 35 KG/M2

## 2021-06-18 DIAGNOSIS — M79.89 PALPABLE MASS OF SOFT TISSUE OF KNEE: ICD-10-CM

## 2021-06-18 DIAGNOSIS — M79.89 PALPABLE MASS OF SOFT TISSUE OF KNEE: Primary | ICD-10-CM

## 2021-06-18 DIAGNOSIS — R79.82 ELEVATED C-REACTIVE PROTEIN (CRP): ICD-10-CM

## 2021-06-18 LAB
A/G RATIO: 1.2 (ref 1.1–2.2)
ALBUMIN SERPL-MCNC: 4.4 G/DL (ref 3.4–5)
ALP BLD-CCNC: 118 U/L (ref 40–129)
ALT SERPL-CCNC: 14 U/L (ref 10–40)
ANION GAP SERPL CALCULATED.3IONS-SCNC: 12 MMOL/L (ref 3–16)
AST SERPL-CCNC: 15 U/L (ref 15–37)
BASOPHILS ABSOLUTE: 0 K/UL (ref 0–0.2)
BASOPHILS RELATIVE PERCENT: 0.5 %
BILIRUB SERPL-MCNC: 0.6 MG/DL (ref 0–1)
BUN BLDV-MCNC: 31 MG/DL (ref 7–20)
C-REACTIVE PROTEIN: 8.9 MG/L (ref 0–5.1)
CALCIUM SERPL-MCNC: 9.7 MG/DL (ref 8.3–10.6)
CHLORIDE BLD-SCNC: 107 MMOL/L (ref 99–110)
CO2: 23 MMOL/L (ref 21–32)
CREAT SERPL-MCNC: 0.7 MG/DL (ref 0.6–1.1)
EOSINOPHILS ABSOLUTE: 0.2 K/UL (ref 0–0.6)
EOSINOPHILS RELATIVE PERCENT: 2.5 %
GFR AFRICAN AMERICAN: >60
GFR NON-AFRICAN AMERICAN: >60
GLOBULIN: 3.6 G/DL
GLUCOSE BLD-MCNC: 85 MG/DL (ref 70–99)
HCT VFR BLD CALC: 41.3 % (ref 36–48)
HEMOGLOBIN: 13.8 G/DL (ref 12–16)
LYMPHOCYTES ABSOLUTE: 2.3 K/UL (ref 1–5.1)
LYMPHOCYTES RELATIVE PERCENT: 29 %
MCH RBC QN AUTO: 29 PG (ref 26–34)
MCHC RBC AUTO-ENTMCNC: 33.4 G/DL (ref 31–36)
MCV RBC AUTO: 86.8 FL (ref 80–100)
MONOCYTES ABSOLUTE: 0.7 K/UL (ref 0–1.3)
MONOCYTES RELATIVE PERCENT: 8.7 %
NEUTROPHILS ABSOLUTE: 4.6 K/UL (ref 1.7–7.7)
NEUTROPHILS RELATIVE PERCENT: 59.3 %
PDW BLD-RTO: 14.9 % (ref 12.4–15.4)
PLATELET # BLD: 193 K/UL (ref 135–450)
PMV BLD AUTO: 11.2 FL (ref 5–10.5)
POTASSIUM SERPL-SCNC: 4.4 MMOL/L (ref 3.5–5.1)
RBC # BLD: 4.76 M/UL (ref 4–5.2)
SEDIMENTATION RATE, ERYTHROCYTE: 44 MM/HR (ref 0–20)
SODIUM BLD-SCNC: 142 MMOL/L (ref 136–145)
TOTAL PROTEIN: 8 G/DL (ref 6.4–8.2)
WBC # BLD: 7.8 K/UL (ref 4–11)

## 2021-06-18 PROCEDURE — 99214 OFFICE O/P EST MOD 30 MIN: CPT | Performed by: INTERNAL MEDICINE

## 2021-06-18 PROCEDURE — G8427 DOCREV CUR MEDS BY ELIG CLIN: HCPCS | Performed by: INTERNAL MEDICINE

## 2021-06-18 PROCEDURE — 1036F TOBACCO NON-USER: CPT | Performed by: INTERNAL MEDICINE

## 2021-06-18 PROCEDURE — G8417 CALC BMI ABV UP PARAM F/U: HCPCS | Performed by: INTERNAL MEDICINE

## 2021-06-18 RX ORDER — DOXYCYCLINE HYCLATE 100 MG
100 TABLET ORAL 2 TIMES DAILY
Qty: 14 TABLET | Refills: 0 | Status: SHIPPED | OUTPATIENT
Start: 2021-06-18 | End: 2021-06-25

## 2021-06-18 ASSESSMENT — ENCOUNTER SYMPTOMS
VOMITING: 0
NAUSEA: 0

## 2021-06-18 NOTE — PROGRESS NOTES
Tinea pedis    Non morbid obesity, unspecified obesity type    Localized osteoarthritis of lumbar spine    Disc degeneration, lumbar    Cellulitis and abscess of left lower extremity    Cellulitis of left leg    Neural foraminal stenosis of lumbar spine        LABS:   Lab Results   Component Value Date    GLUCOSE 90 04/10/2021     Lab Results   Component Value Date     04/10/2021    K 4.3 04/10/2021    CREATININE 0.6 04/10/2021     Cholesterol, Total   Date Value Ref Range Status   04/27/2019 157 0 - 199 mg/dL Final     LDL Calculated   Date Value Ref Range Status   04/27/2019 95 <100 mg/dL Final     HDL   Date Value Ref Range Status   04/27/2019 52 40 - 60 mg/dL Final     Triglycerides   Date Value Ref Range Status   04/27/2019 49 0 - 150 mg/dL Final     Lab Results   Component Value Date    ALT 18 04/10/2021    AST 21 04/10/2021    ALKPHOS 123 04/10/2021    BILITOT 0.5 04/10/2021      Lab Results   Component Value Date    WBC 6.9 04/10/2021    HGB 13.3 04/10/2021    HCT 40.4 04/10/2021    MCV 87.1 04/10/2021     04/10/2021     TSH (uIU/mL)   Date Value   08/05/2015 0.94     Lab Results   Component Value Date    LABA1C 5.3 08/05/2015     No results found for: PSA, PSADIA     PHYSICAL EXAM:  /74 (Site: Right Upper Arm, Position: Sitting, Cuff Size: Large Adult)   Pulse 63   Resp 16   Ht 5' 7\" (1.702 m)   Wt 223 lb (101.2 kg)   LMP 11/02/2016   SpO2 99%   BMI 34.93 kg/m²    Physical Exam  Constitutional:       Appearance: Normal appearance. HENT:      Head: Normocephalic and atraumatic. Pulmonary:      Effort: Pulmonary effort is normal.   Musculoskeletal:      Comments: Has soft tissue mass in the left pop. Area   Not red  It is tender. Neurological:      Mental Status: She is alert. Psychiatric:         Mood and Affect: Mood normal.         Behavior: Behavior normal.         Thought Content:  Thought content normal.         Judgment: Judgment normal.       BP Readings from Last 5 Encounters:   06/18/21 118/74   06/10/21 111/73   05/27/21 108/73   05/10/21 118/80   05/04/21 120/83       Wt Readings from Last 5 Encounters:   06/18/21 223 lb (101.2 kg)   06/10/21 222 lb (100.7 kg)   06/04/21 222 lb (100.7 kg)   05/27/21 222 lb (100.7 kg)   05/10/21 222 lb (100.7 kg)      Ny was seen today for joint swelling. Diagnoses and all orders for this visit:    Palpable mass of soft tissue of knee  -     MRI KNEE LEFT W CONTRAST; Future  -     CBC Auto Differential; Future  -     Comprehensive Metabolic Panel; Future    Elevated C-reactive protein (CRP)  -     C-REACTIVE PROTEIN; Future  -     SEDIMENTATION RATE; Future    Other orders  -     doxycycline hyclate (VIBRA-TABS) 100 MG tablet; Take 1 tablet by mouth 2 times daily for 7 days    has a soft tissue mass in the left pop space  Tender. Mult US have been done  She was hospitalized prior for infection in the left leg  Had a very unusual presentation with red indurated masses.   The area behind the left knee is not indurated and not red but tender  Needs mri  Also has inflammatory markers that were elevated prior

## 2021-06-19 DIAGNOSIS — M51.27 HERNIATED NUCLEUS PULPOSUS, L5-S1: ICD-10-CM

## 2021-06-19 DIAGNOSIS — M47.816 LOCALIZED OSTEOARTHRITIS OF LUMBAR SPINE: ICD-10-CM

## 2021-06-19 DIAGNOSIS — M51.36 DISC DEGENERATION, LUMBAR: ICD-10-CM

## 2021-06-21 ENCOUNTER — TELEPHONE (OUTPATIENT)
Dept: ORTHOPEDIC SURGERY | Age: 51
End: 2021-06-21

## 2021-06-21 DIAGNOSIS — M51.36 DISC DEGENERATION, LUMBAR: Primary | ICD-10-CM

## 2021-06-21 RX ORDER — TRAMADOL HYDROCHLORIDE 50 MG/1
50 TABLET ORAL EVERY 6 HOURS PRN
Qty: 28 TABLET | Refills: 0 | Status: SHIPPED | OUTPATIENT
Start: 2021-06-21 | End: 2021-06-28

## 2021-06-21 RX ORDER — TIZANIDINE 4 MG/1
4 TABLET ORAL NIGHTLY PRN
Qty: 30 TABLET | Refills: 0 | Status: SHIPPED | OUTPATIENT
Start: 2021-06-21 | End: 2021-07-14

## 2021-06-21 NOTE — TELEPHONE ENCOUNTER
General Question     Subject: PATIENT CALLING NEED DIFFERENT PAIN MEDICATION.   Patient and /or Facility Request: PATIENT  Contact Number: [phone]

## 2021-06-21 NOTE — TELEPHONE ENCOUNTER
I spoke with the patient, she is asking for a refill of Tramadol. She is waiting for insurance to approve an epidural injection.

## 2021-07-01 ENCOUNTER — TELEPHONE (OUTPATIENT)
Dept: FAMILY MEDICINE CLINIC | Age: 51
End: 2021-07-01

## 2021-07-01 DIAGNOSIS — M79.89 PALPABLE MASS OF SOFT TISSUE OF KNEE: Primary | ICD-10-CM

## 2021-07-01 DIAGNOSIS — E86.0 DEHYDRATION: ICD-10-CM

## 2021-07-01 NOTE — TELEPHONE ENCOUNTER
Called and spoke farhan Candelaria in scheduling  MRI is ready to be scheduled  She will call pt and set up  Pt aware that she needs to stay hydrated and repeat kidney function lab before MRI

## 2021-07-01 NOTE — TELEPHONE ENCOUNTER
Pls call scheduling. The original order that I put in is correct   Mri of knee with contrast   ( IV contrast)  Make sure it is iv contrast.  This is not an arthrogram.  I called and personally spoke to a radiologist today and made sure that this was correct.   I can place the same order again today and put in additional comments

## 2021-07-01 NOTE — TELEPHONE ENCOUNTER
Be sure to specifically say this is NOT an arthrogram  This is to evaluate a soft tissue mass in the popliteal space.

## 2021-07-01 NOTE — TELEPHONE ENCOUNTER
Pls let Ny know the original mri order was correct  but I reordered  the study today with additional information for radiology  She does need to do  a renal profile before getting contrast as her BUN was high on her last lab. She may have been dehydrated then. Pls be hydrated and repeat lab to make sure we can give her contrast.  Also , after contrast drink extra fluids for a day. If this is being sched in the next few day go ahead and do the lab today or tomorrow. I am out all next week . Will need her bun close to normal to give her contrast or will need to change the order to without contrast. My partners can do this if needed.

## 2021-07-06 ENCOUNTER — HOSPITAL ENCOUNTER (OUTPATIENT)
Age: 51
Setting detail: OUTPATIENT SURGERY
Discharge: HOME | End: 2021-07-06
Attending: ANESTHESIOLOGY | Admitting: ANESTHESIOLOGY
Payer: MEDICAID

## 2021-07-06 ENCOUNTER — APPOINTMENT (OUTPATIENT)
Dept: GENERAL RADIOLOGY | Age: 51
End: 2021-07-06
Attending: ANESTHESIOLOGY
Payer: MEDICAID

## 2021-07-06 VITALS
SYSTOLIC BLOOD PRESSURE: 127 MMHG | TEMPERATURE: 96.2 F | OXYGEN SATURATION: 97 % | RESPIRATION RATE: 18 BRPM | HEIGHT: 67 IN | BODY MASS INDEX: 34.84 KG/M2 | HEART RATE: 73 BPM | DIASTOLIC BLOOD PRESSURE: 85 MMHG | WEIGHT: 222 LBS

## 2021-07-06 PROCEDURE — 3209999900 FLUORO FOR SURGICAL PROCEDURES

## 2021-07-06 PROCEDURE — 99152 MOD SED SAME PHYS/QHP 5/>YRS: CPT | Performed by: ANESTHESIOLOGY

## 2021-07-06 PROCEDURE — 3610000056 HC PAIN LEVEL 4 BASE (NON-OR): Performed by: ANESTHESIOLOGY

## 2021-07-06 PROCEDURE — 2500000003 HC RX 250 WO HCPCS: Performed by: ANESTHESIOLOGY

## 2021-07-06 PROCEDURE — 2709999900 HC NON-CHARGEABLE SUPPLY: Performed by: ANESTHESIOLOGY

## 2021-07-06 PROCEDURE — 6360000002 HC RX W HCPCS: Performed by: ANESTHESIOLOGY

## 2021-07-06 RX ORDER — DEXAMETHASONE SODIUM PHOSPHATE 10 MG/ML
INJECTION INTRAMUSCULAR; INTRAVENOUS
Status: COMPLETED | OUTPATIENT
Start: 2021-07-06 | End: 2021-07-06

## 2021-07-06 RX ORDER — PREDNISONE 20 MG/1
20 TABLET ORAL DAILY
COMMUNITY
End: 2021-07-19

## 2021-07-06 RX ORDER — TRAMADOL HYDROCHLORIDE 50 MG/1
50 TABLET ORAL EVERY 6 HOURS PRN
COMMUNITY
End: 2021-08-10

## 2021-07-06 RX ORDER — FENTANYL CITRATE 50 UG/ML
INJECTION, SOLUTION INTRAMUSCULAR; INTRAVENOUS
Status: COMPLETED | OUTPATIENT
Start: 2021-07-06 | End: 2021-07-06

## 2021-07-06 RX ORDER — BUPIVACAINE HYDROCHLORIDE 2.5 MG/ML
INJECTION, SOLUTION EPIDURAL; INFILTRATION; INTRACAUDAL
Status: COMPLETED | OUTPATIENT
Start: 2021-07-06 | End: 2021-07-06

## 2021-07-06 ASSESSMENT — PAIN DESCRIPTION - ORIENTATION
ORIENTATION: LOWER
ORIENTATION: LOWER

## 2021-07-06 ASSESSMENT — PAIN SCALES - GENERAL
PAINLEVEL_OUTOF10: 1
PAINLEVEL_OUTOF10: 1

## 2021-07-06 ASSESSMENT — PAIN DESCRIPTION - DESCRIPTORS
DESCRIPTORS: DULL

## 2021-07-06 ASSESSMENT — PAIN - FUNCTIONAL ASSESSMENT: PAIN_FUNCTIONAL_ASSESSMENT: 0-10

## 2021-07-06 ASSESSMENT — PAIN DESCRIPTION - LOCATION
LOCATION: BACK
LOCATION: BACK

## 2021-07-06 ASSESSMENT — PAIN DESCRIPTION - FREQUENCY
FREQUENCY: CONTINUOUS
FREQUENCY: CONTINUOUS

## 2021-07-07 ENCOUNTER — TELEPHONE (OUTPATIENT)
Dept: FAMILY MEDICINE CLINIC | Age: 51
End: 2021-07-07

## 2021-07-07 DIAGNOSIS — R93.6 ABNORMAL X-RAY OF KNEE: Primary | ICD-10-CM

## 2021-07-07 NOTE — H&P
Update History & Physical    The patient's History and Physical of June 16,  was reviewed with the patient and I examined the patient. There was no change. The surgical site was confirmed by the patient and me. Plan: The risks, benefits, expected outcome, and alternative to the recommended procedure have been discussed with the patient. Patient understands and wants to proceed with the procedure.      Electronically signed by Baron Mitesh MD on 7/7/2021 at 4:47 PM

## 2021-07-07 NOTE — TELEPHONE ENCOUNTER
Called and lvm for Padma[facility] to call me  This was addressed in depth last week  See notes. .  Note     Pls call scheduling. The original order that I put in is correct   Mri of knee with contrast   ( IV contrast)  Make sure it is iv contrast.  This is not an arthrogram.  I called and personally spoke to a radiologist today and made sure that this was correct. I can place the same order again today and put in additional comments         Rogelio Xie MD  to [facility]    7/1/21 2:10 PM  Note     Be sure to specifically say this is NOT an arthrogram  This is to evaluate a soft tissue mass in the popliteal space. Rogelio Xie MD  to [facility]    7/1/21 2:22 PM  Note     Pls let Ny know the original mri order was correct  but I reordered  the study today with additional information for radiology  She does need to do  a renal profile before getting contrast as her BUN was high on her last lab. She may have been dehydrated then. Pls be hydrated and repeat lab to make sure we can give her contrast.  Also , after contrast drink extra fluids for a day. If this is being sched in the next few day go ahead and do the lab today or tomorrow. I am out all next week .  Will need her bun close to normal to give her contrast or will need to change the order to without contrast. My partners can do this if needed.

## 2021-07-07 NOTE — TELEPHONE ENCOUNTER
Jann Marlow with central scheduling called to let Dr. Bayron Bravo know that the MRI order she put in for this pt on July 1 needs to say MRI Knee Left with and with out contrast, not just with contrast    Please Advise

## 2021-07-08 NOTE — TELEPHONE ENCOUNTER
Spoke to Jared Fried w scheduling  She called MRI dept and reviewed notes in chart  MRI refuses to do this unless order is placed for w and wo contrast  Please advise

## 2021-07-09 NOTE — PROGRESS NOTES
Post Procedure Call    Patient reached by phone at 2:10 PM on 7/9/2021. Patient states pain is \"no relief\". Patient has no complaints of headache or fever. Patient was instructed to removed dressing (if not already done). Patient reminded to call if any problems.

## 2021-07-12 NOTE — OP NOTE
Operative Note      Patient: Sharonda Busch  YOB: 1970  MRN: [id number]    Date of Procedure: 7/6/2021    Pre-Op Diagnosis: m47.816    Post-Op Diagnosis: Same       Procedure(s):  BILATERAL L3,L4 MEDIAL BRANCH BLOCKS WITH FLUOROSCOPY    Surgeon(s):  Sean Adamson MD    Assistant:   * No surgical staff found *    Anesthesia: IV Sedation    Estimated Blood Loss (mL): Minimal    Complications: None    Specimens:   * No specimens in log *    Implants:  * No implants in log *      Drains: * No LDAs found *    Findings:     Detailed Description of Procedure:   Procedure in Detail:   The patient's chart was reviewed. After obtaining written informed consent, the patient had a 20 g IV placed, and NS was running at 30 ml/hour, the patient was placed in the supine position with the leg slightly flexed. Versed and Fentanyl was given to the patient intravenous, a NC at 4 l was placed and monitors attached. PROCEDURE: MEDICAL NECESSITY: This patient has chronic pain that has failed to respond to conservative measures as outlined in the original history and last physical exam.   The patient's symptoms include Pain and disability of a moderate to severe degree with intermittent refereed pain. The goals of treatment are to 1) achieve optimal pain control, recognizing that a pain-free state may not be achievable; 2) minimize adverse outcomes; 3) enhance functional abilities, and physical and psychological well-being; and 4) enhance the quality of life for patients with chronic pain. The patient has documented pathology through radiographic imaging, the patient has the same or similar procedure in the past which resulted in significant improvement more than 50% reduction in the pain, as well improvement in their Activity of daily living and quality of life, and this would be the goal for the first time procedure. PROCEDURE NOTE: After obtaining written informed consent patient was taken to the procedure room.

## 2021-07-14 DIAGNOSIS — M51.36 DISC DEGENERATION, LUMBAR: ICD-10-CM

## 2021-07-14 DIAGNOSIS — M51.27 HERNIATED NUCLEUS PULPOSUS, L5-S1: ICD-10-CM

## 2021-07-14 DIAGNOSIS — M47.816 LOCALIZED OSTEOARTHRITIS OF LUMBAR SPINE: ICD-10-CM

## 2021-07-14 RX ORDER — TIZANIDINE 4 MG/1
4 TABLET ORAL NIGHTLY PRN
Qty: 30 TABLET | Refills: 0 | Status: SHIPPED | OUTPATIENT
Start: 2021-07-14 | End: 2021-12-08

## 2021-07-16 ENCOUNTER — OFFICE VISIT (OUTPATIENT)
Dept: ORTHOPEDIC SURGERY | Age: 51
End: 2021-07-16
Payer: MEDICAID

## 2021-07-16 ENCOUNTER — HOSPITAL ENCOUNTER (OUTPATIENT)
Dept: MRI IMAGING | Age: 51
Discharge: HOME | End: 2021-07-16
Payer: MEDICAID

## 2021-07-16 VITALS — BODY MASS INDEX: 34.84 KG/M2 | WEIGHT: 222 LBS | HEIGHT: 67 IN

## 2021-07-16 DIAGNOSIS — R93.6 ABNORMAL X-RAY OF KNEE: ICD-10-CM

## 2021-07-16 DIAGNOSIS — M54.50 LOW BACK PAIN, UNSPECIFIED BACK PAIN LATERALITY, UNSPECIFIED CHRONICITY, UNSPECIFIED WHETHER SCIATICA PRESENT: ICD-10-CM

## 2021-07-16 DIAGNOSIS — M51.27 HERNIATED NUCLEUS PULPOSUS, L5-S1: Primary | ICD-10-CM

## 2021-07-16 DIAGNOSIS — M51.36 DISC DEGENERATION, LUMBAR: ICD-10-CM

## 2021-07-16 PROCEDURE — A9577 INJ MULTIHANCE: HCPCS | Performed by: FAMILY MEDICINE

## 2021-07-16 PROCEDURE — 99213 OFFICE O/P EST LOW 20 MIN: CPT | Performed by: PHYSICIAN ASSISTANT

## 2021-07-16 PROCEDURE — 1036F TOBACCO NON-USER: CPT | Performed by: PHYSICIAN ASSISTANT

## 2021-07-16 PROCEDURE — 6360000004 HC RX CONTRAST MEDICATION: Performed by: FAMILY MEDICINE

## 2021-07-16 PROCEDURE — G8417 CALC BMI ABV UP PARAM F/U: HCPCS | Performed by: PHYSICIAN ASSISTANT

## 2021-07-16 PROCEDURE — 73723 MRI JOINT LWR EXTR W/O&W/DYE: CPT

## 2021-07-16 PROCEDURE — G8427 DOCREV CUR MEDS BY ELIG CLIN: HCPCS | Performed by: PHYSICIAN ASSISTANT

## 2021-07-16 RX ADMIN — GADOBENATE DIMEGLUMINE 20 ML: 529 INJECTION, SOLUTION INTRAVENOUS at 14:33

## 2021-07-16 NOTE — PROGRESS NOTES
New Patient: LUMBAR SPINE    Referring Provider:  No ref. provider found    CHIEF COMPLAINT:    Chief Complaint   Patient presents with    Follow-up     lumbar       HISTORY OF PRESENT ILLNESS:    Ms. Quoc Kiser  is a pleasant 53 y.o. female here for consultation regarding her LBP. Patient was last seen on 06/04/2021 where she was diagnosed with L5-S1 disc herniation and degenerative disc disease. Diagnosis was corroborated by MRI imaging. Patient had already attempted conservative measures including: rest, ice, elevation, bracing, physical therapy, aquatic therapy, home exercises, activity modification, NSAIDs as needed for pain, corticosteroids, muscle relaxers, narcotic pain medication, and epidural injections. Patient continued these conservative measures including physical therapy and aquatic therapy. She has had chiropractic adjustments in the past with little relief. An attempt to further decrease her pain patient was seen by Dr. Samia Gee for epidural injections. Patient received these injections which she states only worsened her condition. Patient states she has a difficult time finding a comfortable position when seated or lying down. Back pain is exacerbated when she has been in a sedentary position for a long period of time or standing/walking for a long period of time. She is finding it difficulty to conduct her work duties and states that she can only work for approximately 5 hours a day. Her goal at this time is to be able to work an 8-hour shift with tolerable to no pain. She denies any new injury. She denies any change in bowel or bladder habits. Current/Past Treatment:   · Physical Therapy: Yes including aquatic therapy  · Chiropractic: Yes  · Injection: Yes with worsening condition. Attempted twice  · Medications:    · NSAIDS: OTC medications  · Muscle relaxer:   Zanaflex  · Steriods:   Both oral and epidural injections  · Neuropathic medications:  Gabapentin  · Opioids: Ultram 50 mg 3 times daily as needed  · Other: NONE  ·  Surgery/Consult NONE    Past Medical History:   Past Medical History:   Diagnosis Date    Anxiety     Chronic back pain     Headache(784.0)     Hx of blood clots     march 2016-was on xarelto for 6months    Obstructive apnea     Seizures ([facility] [address]     states no longer  has them-last one was in 2013-june      Past Surgical History:     Past Surgical History:   Procedure Laterality Date    EPIDURAL STEROID INJECTION N/A 9/10/2019    MIDLINE LUMBAR FIVE SACRAL ONE EPIDURAL STEROID INJECTION SITE CONFIRMED BY FLUOROSCOPY performed by Zenia Yadav MD at [address] Right 2003    RIGHT KNEE    KNEE SURGERY Left 2012    LASIK Bilateral 2004    PAIN MANAGEMENT PROCEDURE Bilateral 7/6/2021    BILATERAL L3,L4 MEDIAL BRANCH BLOCKS WITH FLUOROSCOPY performed by Lacey Amador MD at [facility]  11.4.16    Dr. Governor Akhtar       Current Medications:     Current Outpatient Medications:     tiZANidine (ZANAFLEX) 4 MG tablet, TAKE 1 TABLET BY MOUTH NIGHTLY AS NEEDED (SPASM), Disp: 30 tablet, Rfl: 0    traMADol (ULTRAM) 50 MG tablet, Take 50 mg by mouth every 6 hours as needed for Pain., Disp: , Rfl:     gabapentin (NEURONTIN) 100 MG capsule, Take 1 capsule by mouth 3 times daily for 60 days. Intended supply: 30 days, Disp: 90 capsule, Rfl: 1    Multiple Vitamins-Minerals (MULTIVITAMIN ADULT PO), Take 1 tablet by mouth daily, Disp: , Rfl:     predniSONE (DELTASONE) 20 MG tablet, Take 20 mg by mouth daily, Disp: , Rfl:   Allergies:  Latex, Bee pollen, and Lime flavor [flavoring agent]  Social History:    reports that she quit smoking about 33 years ago. Her smoking use included cigarettes. She quit after 1.00 year of use. She has never used smokeless tobacco. She reports that she does not drink alcohol and does not use drugs.   Family History:   Family History   Problem Relation Age of Onset  Heart Disease Father     Other Father         LILIAN    Diabetes Father     Alcohol Abuse Father     Obesity Father     Alzheimer's Disease Other         Grandparents    Breast Cancer Other         Aunt    Lung Cancer Other         Grandma    Colon Cancer Other         Grandpa    Diabetes Other         Grandma    Seizures Sister         Uncle, son       REVIEW OF SYSTEMS: Full ROS noted & scanned   CONSTITUTIONAL: Denies unexplained weight loss, fevers, chills or fatigue  NEUROLOGICAL: Denies unsteady gait or progressive weakness  MUSCULOSKELETAL: Denies joint swelling or redness  PSYCHOLOGICAL: Denies anxiety, depression   SKIN: Denies skin changes, delayed healing, rash, itching   HEMATOLOGIC: Denies easy bleeding or bruising  ENDOCRINE: Denies excessive thirst, urination, heat/cold  RESPIRATORY: Denies current dyspnea, cough  GI: Denies nausea, vomiting, diarrhea   : Denies bowel or bladder issues      PHYSICAL EXAM:    Vitals: Height 5' 7\" (1.702 m), weight 222 lb (100.7 kg), last menstrual period 11/02/2016, not currently breastfeeding. GENERAL EXAM:  · General Apparence: Patient is adequately groomed with no evidence of malnutrition. · Orientation: The patient is oriented to time, place and person. · Mood & Affect:The patient's mood and affect are appropriate. · Vascular: Examination reveals no swelling tenderness in upper or lower extremities. Good capillary refill. · Lymphatic: The lymphatic examination bilaterally reveals all areas to be without enlargement or induration  · Sensation: Sensation is intact without deficit  · Coordination/Balance: Good coordination       LUMBAR/SACRAL EXAMINATION:  · Inspection: Local inspection shows no step-off or bruising. Lumbar alignment is normal.  Sagittal and Coronal balance is neutral.  Patient sitting comfortably in her chair. · Palpation: Tenderness at midline and distal lumbar spine from approximately T11-S1.  Palpable muscle spasms to the paraspinal muscles of the lumbar spine. There is no step-off. · Range of Motion: 30 degrees of flexion, 10 degrees extension. 10 left and right rotation. Pain at all end ranges  · Strength:   Grossly intact  · Special Tests:   Straight leg raise and crossed SLR positive. Slump test negative,    · Skin: There are no rashes, ulcerations or lesions. · Reflexes: Reflexes are symmetrically 2+ at the patellar and ankle tendons. Clonus absent bilaterally at the feet. · Gait & station: Antalgic, patient ambulates without assistance or assistive device  · Additional Examinations:   · RIGHT LOWER EXTREMITY: Inspection/examination of the right lower extremity does not show any tenderness, deformity or injury. Range of motion is unremarkable. There is no gross instability. There are no rashes, ulcerations or lesions. Strength and tone are decreased. · LEFT LOWER EXTREMITY:  Inspection/examination of the left lower extremity does not show any tenderness, deformity or injury. Range of motion is unremarkable. There is no gross instability. There are no rashes, ulcerations or lesions. Strength and tone are decreased. Diagnostic Testing:    Reviewed     Impression:    Diagnosis Orders   1. Herniated nucleus pulposus, L5-S1  Kerri Borges MD, Orthopedic Surgery, [facility]   2. Disc degeneration, lumbar  Kerri Borges MD, Orthopedic Surgery, [facility]   3. Low back pain, unspecified back pain laterality, unspecified chronicity, unspecified whether sciatica present  Kerri Borges MD, Orthopedic Surgery, [facility]         Plan:    Above diagnoses are Worsening    1. Medications: No further recommendations for new medications. 2. PT:  Encouraged to continue with HEP. Continue aquatic therapy. 3. Further studies:  No further studies to be obtained. 4. Interventional:  At this point, no interventional options are recommended.   Patient is to follow-up with Dr. Jayne Curling for further recommendation on possible surgical options versus continued conservative therapy. At this point I believe the patient has exhausted/maximized the majority of conservative measures.             Hai Montilla PA-C    Physician Assistant - Certified  [address]    07/17/21 1:18 PM

## 2021-07-19 ENCOUNTER — OFFICE VISIT (OUTPATIENT)
Dept: FAMILY MEDICINE CLINIC | Age: 51
End: 2021-07-19
Payer: MEDICAID

## 2021-07-19 VITALS
DIASTOLIC BLOOD PRESSURE: 64 MMHG | RESPIRATION RATE: 14 BRPM | WEIGHT: 220.4 LBS | OXYGEN SATURATION: 95 % | SYSTOLIC BLOOD PRESSURE: 110 MMHG | HEIGHT: 67 IN | HEART RATE: 84 BPM | BODY MASS INDEX: 34.59 KG/M2

## 2021-07-19 DIAGNOSIS — M71.21 POPLITEAL CYST, RIGHT: ICD-10-CM

## 2021-07-19 DIAGNOSIS — R79.89 POSITIVE D-DIMER: ICD-10-CM

## 2021-07-19 DIAGNOSIS — M71.21 POPLITEAL CYST, RIGHT: Primary | ICD-10-CM

## 2021-07-19 DIAGNOSIS — M71.22 POPLITEAL CYST, UNRUPTURED, LEFT: Primary | ICD-10-CM

## 2021-07-19 DIAGNOSIS — F43.21 GRIEF: ICD-10-CM

## 2021-07-19 LAB — D DIMER: 307 NG/ML DDU (ref 0–229)

## 2021-07-19 PROCEDURE — G8417 CALC BMI ABV UP PARAM F/U: HCPCS | Performed by: INTERNAL MEDICINE

## 2021-07-19 PROCEDURE — 99214 OFFICE O/P EST MOD 30 MIN: CPT | Performed by: INTERNAL MEDICINE

## 2021-07-19 PROCEDURE — 1036F TOBACCO NON-USER: CPT | Performed by: INTERNAL MEDICINE

## 2021-07-19 PROCEDURE — G8427 DOCREV CUR MEDS BY ELIG CLIN: HCPCS | Performed by: INTERNAL MEDICINE

## 2021-07-19 SDOH — ECONOMIC STABILITY: FOOD INSECURITY: WITHIN THE PAST 12 MONTHS, THE FOOD YOU BOUGHT JUST DIDN'T LAST AND YOU DIDN'T HAVE MONEY TO GET MORE.: NEVER TRUE

## 2021-07-19 SDOH — ECONOMIC STABILITY: FOOD INSECURITY: WITHIN THE PAST 12 MONTHS, YOU WORRIED THAT YOUR FOOD WOULD RUN OUT BEFORE YOU GOT MONEY TO BUY MORE.: NEVER TRUE

## 2021-07-19 ASSESSMENT — ENCOUNTER SYMPTOMS: COLOR CHANGE: 0

## 2021-07-19 ASSESSMENT — SOCIAL DETERMINANTS OF HEALTH (SDOH): HOW HARD IS IT FOR YOU TO PAY FOR THE VERY BASICS LIKE FOOD, HOUSING, MEDICAL CARE, AND HEATING?: NOT HARD AT ALL

## 2021-07-19 NOTE — PROGRESS NOTES
2021    Chief Complaint   Patient presents with    Other     pt has a bump behind her R knee. noticed for about a week and a half. HPI  Bump behind the right knee 2 weeks    Not painful  Neither knee hurts    Has mass behind the let knee - comes and goes   Never hurts   Had mri Friday and has seen results. Lost grandmom and uncle  Saturday this month  Now father in the hospital   Lost  3 cousins this yr. - never knew them       1. Small popliteal cyst, otherwise no evidence for soft tissue mass or   abnormal enhancement of the soft tissues. 2. No evidence for internal derangement of the knee. 3. No acute osseous abnormality identified.           No further red nodules in the left leg or symptoms of cellulitis    Review of Systems   Musculoskeletal: Positive for gait problem (due to back not due to knee). Skin: Negative for color change and rash. Psychiatric/Behavioral: Negative for dysphoric mood. The patient is not nervous/anxious.         Health Maintenance   Topic Date Due    Hepatitis C screen  Never done    COVID-19 Vaccine (1) Never done    HIV screen  Never done    Cervical cancer screen  2017    Colon Cancer Screen FIT/FOBT  2020    Flu vaccine (1) 2021    Lipid screen  2024    DTaP/Tdap/Td vaccine (3 - Td or Tdap) 2028    Hepatitis A vaccine  Aged Out    Hepatitis B vaccine  Aged Out    Hib vaccine  Aged Out    Meningococcal (ACWY) vaccine  Aged Out    Pneumococcal 0-64 years Vaccine  Aged Out      Social History     Tobacco Use    Smoking status: Former Smoker     Years: 1.00     Types: Cigarettes     Quit date: 1987     Years since quittin.7    Smokeless tobacco: Never Used    Tobacco comment: counseled on tobacco exposure avoidance   Vaping Use    Vaping Use: Never used   Substance Use Topics    Alcohol use: No     Alcohol/week: 0.0 standard drinks    Drug use: No      Family History   Problem Relation Age of Onset    Heart Disease Father     Other Father         LILIAN    Diabetes Father     Alcohol Abuse Father     Obesity Father     Alzheimer's Disease Other         Grandparents    Breast Cancer Other         Aunt    Lung Cancer Other         Grandma    Colon Cancer Other         Grandpa    Diabetes Other         Grandma    Seizures Sister         Samuel Gamboa, son     Prior to Visit Medications    Medication Sig Taking? Authorizing Provider   tiZANidine (ZANAFLEX) 4 MG tablet TAKE 1 TABLET BY MOUTH NIGHTLY AS NEEDED (SPASM) Yes SARI Pierre   traMADol (ULTRAM) 50 MG tablet Take 50 mg by mouth every 6 hours as needed for Pain. Yes Historical Provider, MD   gabapentin (NEURONTIN) 100 MG capsule Take 1 capsule by mouth 3 times daily for 60 days.  Intended supply: 30 days Yes Odella Snellen, PA   Multiple Vitamins-Minerals (MULTIVITAMIN ADULT PO) Take 1 tablet by mouth daily Yes Historical Provider, MD     Patient Active Problem List   Diagnosis    Left knee pain    Low back pain--prn pain meds (nsaids)    Herniated nucleus pulposus, L5-S1    Anxiety--on ssri and seroquel with help--does not see psychiatrist currently    GERD (gastroesophageal reflux disease)--off ppi currently--s/p egd 6/16--wnl    Obstructive apnea    Acute DVT of right tibial vein ([address]- started xarelto 3/9/16==saw dr moya-(hematol)-tx x 6 mo--feels provoked by dvt--rest of w/u pending post off meds    Colon polyp--on colo 6/16--dr wilcox    Pelvic pain in female    Adnexal mass    Iron deficiency anemia    Chest pain    Hypoglycemia    Thyroid nodule    Tinea pedis    Non morbid obesity, unspecified obesity type    Localized osteoarthritis of lumbar spine    Disc degeneration, lumbar    Cellulitis and abscess of left lower extremity    Cellulitis of left leg    Neural foraminal stenosis of lumbar spine        LABS:   Lab Results   Component Value Date    GLUCOSE 85 06/18/2021     Lab Results   Component Value Date     06/18/2021    K 4.4 06/18/2021    CREATININE 0.7 06/18/2021     Cholesterol, Total   Date Value Ref Range Status   04/27/2019 157 0 - 199 mg/dL Final     LDL Calculated   Date Value Ref Range Status   04/27/2019 95 <100 mg/dL Final     HDL   Date Value Ref Range Status   04/27/2019 52 40 - 60 mg/dL Final     Triglycerides   Date Value Ref Range Status   04/27/2019 49 0 - 150 mg/dL Final     Lab Results   Component Value Date    ALT 14 06/18/2021    AST 15 06/18/2021    ALKPHOS 118 06/18/2021    BILITOT 0.6 06/18/2021      Lab Results   Component Value Date    WBC 7.8 06/18/2021    HGB 13.8 06/18/2021    HCT 41.3 06/18/2021    MCV 86.8 06/18/2021     06/18/2021     TSH (uIU/mL)   Date Value   08/05/2015 0.94     Lab Results   Component Value Date    LABA1C 5.3 08/05/2015     No results found for: PSA, PSADIA     PHYSICAL EXAM:  /64   Pulse 84   Resp 14   Ht 5' 7\" (1.702 m)   Wt 220 lb 6.4 oz (100 kg)   LMP 11/02/2016   SpO2 95%   BMI 34.52 kg/m²    Physical Exam  Constitutional:       Appearance: Normal appearance. HENT:      Head: Normocephalic and atraumatic. Pulmonary:      Effort: Pulmonary effort is normal.   Musculoskeletal:      Comments: Slight fullness behind both knees  bilat  Bruise behind the left knee    No swelling or redness in both knees     Neurological:      General: No focal deficit present. Mental Status: She is alert. Psychiatric:         Mood and Affect: Mood normal.         Behavior: Behavior normal.         Thought Content: Thought content normal.         Judgment: Judgment normal.       BP Readings from Last 5 Encounters:   07/19/21 110/64   07/06/21 127/85   06/18/21 118/74   06/10/21 111/73   05/27/21 108/73       Wt Readings from Last 5 Encounters:   07/19/21 220 lb 6.4 oz (100 kg)   07/16/21 222 lb (100.7 kg)   07/06/21 222 lb (100.7 kg)   06/18/21 223 lb (101.2 kg)   06/10/21 222 lb (100.7 kg)      Ny was seen today for other.     Diagnoses and all orders for this visit:    Popliteal cyst, unruptured, left    Popliteal cyst, right  -     VL DUP LOWER EXTREMITY VENOUS RIGHT; Future  -     D-DIMER, QUANTITATIVE;  Future    Grief    recommend companions on a journey  And recommend talking to Cee

## 2021-07-19 NOTE — PATIENT INSTRUCTIONS
Patient Education        Manley's Cyst: Care Instructions  Your Care Instructions     A Baker's cyst is a swelling behind the knee. It may cause pain or stiffness when you bend your knee or straighten it all the way. Baker's cysts are also called popliteal cysts. If you have arthritis or another condition that is the cause of the Baker's cyst, your doctor may treat that condition. A Baker's cyst may go away on its own. If not, or if it is causing a lot of discomfort, your doctor may drain the fluid that has built up behind the knee. In some cases, a Baker's cyst is removed in surgery. There are things you can do at home, such as staying off your leg, to reduce the swelling and pain. Follow-up care is a key part of your treatment and safety. Be sure to make and go to all appointments, and call your doctor if you are having problems. It's also a good idea to know your test results and keep a list of the medicines you take. How can you care for yourself at home? · Rest your knee as much as possible. · Ask your doctor if you can take an over-the-counter pain medicine, such as acetaminophen (Tylenol), ibuprofen (Advil, Motrin), or naproxen (Aleve). Be safe with medicines. Read and follow all instructions on the label. · Use a cane, a crutch, a walker, or another device if you need help to get around. These can help rest your knees. · If you have an elastic bandage, make sure it is snug but not so tight that your leg is numb, tingles, or swells below the bandage. Ask your doctor if you can loosen the bandage if it is too tight. · Follow your doctor's instructions about how much weight you can put on your knee. · Stay at a healthy weight. Being overweight puts extra strain on your knee. When should you call for help? Call 911 anytime you think you may need emergency care. For example, call if:    · You have chest pain, are short of breath, or you cough up blood.    Call your doctor now or seek immediate medical care if:    · You have new or worse pain.     · Your foot is cool or pale or changes color.     · You have tingling, weakness, or numbness in your foot or toes.     · You have signs of a blood clot in your leg (called a deep vein thrombosis), such as:  ? Pain in your calf, back of the knee, thigh, or groin. ? Redness or swelling in your leg. Watch closely for changes in your health, and be sure to contact your doctor if:    · You do not get better as expected. Where can you learn more? Go to https://CoupaypeBuyVIPeb.Breezeplay. org and sign in to your [employer] account. Enter L276 in the ev-social box to learn more about \"Baker's Cyst: Care Instructions. \"     If you do not have an account, please click on the \"Sign Up Now\" link. Current as of: November 16, 2020               Content Version: 12.9  © 2006-2021 [employer]. Care instructions adapted under license by [facility] ([facility]). If you have questions about a medical condition or this instruction, always ask your healthcare professional. [place] [address] any warranty or liability for your use of this information. Patient Education        Manley's Cyst: Care Instructions  Your Care Instructions     A Baker's cyst is a swelling behind the knee. It may cause pain or stiffness when you bend your knee or straighten it all the way. Baker's cysts are also called popliteal cysts. If you have arthritis or another condition that is the cause of the Baker's cyst, your doctor may treat that condition. A Baker's cyst may go away on its own. If not, or if it is causing a lot of discomfort, your doctor may drain the fluid that has built up behind the knee. In some cases, a Baker's cyst is removed in surgery. There are things you can do at home, such as staying off your leg, to reduce the swelling and pain. Follow-up care is a key part of your treatment and safety.  Be sure to make and go to all appointments, and call your doctor if you are having problems. It's also a good idea to know your test results and keep a list of the medicines you take. How can you care for yourself at home? · Rest your knee as much as possible. · Ask your doctor if you can take an over-the-counter pain medicine, such as acetaminophen (Tylenol), ibuprofen (Advil, Motrin), or naproxen (Aleve). Be safe with medicines. Read and follow all instructions on the label. · Use a cane, a crutch, a walker, or another device if you need help to get around. These can help rest your knees. · If you have an elastic bandage, make sure it is snug but not so tight that your leg is numb, tingles, or swells below the bandage. Ask your doctor if you can loosen the bandage if it is too tight. · Follow your doctor's instructions about how much weight you can put on your knee. · Stay at a healthy weight. Being overweight puts extra strain on your knee. When should you call for help? Call 911 anytime you think you may need emergency care. For example, call if:    · You have chest pain, are short of breath, or you cough up blood. Call your doctor now or seek immediate medical care if:    · You have new or worse pain.     · Your foot is cool or pale or changes color.     · You have tingling, weakness, or numbness in your foot or toes.     · You have signs of a blood clot in your leg (called a deep vein thrombosis), such as:  ? Pain in your calf, back of the knee, thigh, or groin. ? Redness or swelling in your leg. Watch closely for changes in your health, and be sure to contact your doctor if:    · You do not get better as expected. Where can you learn more? Go to https://Peak8 PartnerspeYours Florallyeb.Medical Simulation. org and sign in to your [employer] account. Enter Z529 in the [employer] box to learn more about \"Baker's Cyst: Care Instructions. \"     If you do not have an account, please click on the \"Sign Up Now\" link.   Current as of: November 16, how much weight you can put on your knee. · Stay at a healthy weight. Being overweight puts extra strain on your knee. When should you call for help? Call 911 anytime you think you may need emergency care. For example, call if:    · You have chest pain, are short of breath, or you cough up blood. Call your doctor now or seek immediate medical care if:    · You have new or worse pain.     · Your foot is cool or pale or changes color.     · You have tingling, weakness, or numbness in your foot or toes.     · You have signs of a blood clot in your leg (called a deep vein thrombosis), such as:  ? Pain in your calf, back of the knee, thigh, or groin. ? Redness or swelling in your leg. Watch closely for changes in your health, and be sure to contact your doctor if:    · You do not get better as expected. Where can you learn more? Go to https://DCITS.MPOWER Mobile. org and sign in to your [employer] account. Enter W612 in the Path.To box to learn more about \"Baker's Cyst: Care Instructions. \"     If you do not have an account, please click on the \"Sign Up Now\" link. Current as of: November 16, 2020               Content Version: 12.9  © 2006-2021 [employer]. Care instructions adapted under license by [facility] ([facility]). If you have questions about a medical condition or this instruction, always ask your healthcare professional. [place] [address] any warranty or liability for your use of this information.

## 2021-07-20 ENCOUNTER — HOSPITAL ENCOUNTER (OUTPATIENT)
Dept: VASCULAR LAB | Age: 51
Discharge: HOME | End: 2021-07-20
Payer: MEDICAID

## 2021-07-20 DIAGNOSIS — R79.89 POSITIVE D-DIMER: ICD-10-CM

## 2021-07-20 DIAGNOSIS — M71.21 POPLITEAL CYST, RIGHT: ICD-10-CM

## 2021-07-20 PROCEDURE — 93971 EXTREMITY STUDY: CPT

## 2021-08-03 ENCOUNTER — PATIENT MESSAGE (OUTPATIENT)
Dept: ORTHOPEDIC SURGERY | Age: 51
End: 2021-08-03

## 2021-08-03 ENCOUNTER — OFFICE VISIT (OUTPATIENT)
Dept: ORTHOPEDIC SURGERY | Age: 51
End: 2021-08-03
Payer: MEDICAID

## 2021-08-03 VITALS — WEIGHT: 220 LBS | HEIGHT: 67 IN | BODY MASS INDEX: 34.53 KG/M2

## 2021-08-03 DIAGNOSIS — M51.36 LUMBAR DEGENERATIVE DISC DISEASE: Primary | ICD-10-CM

## 2021-08-03 DIAGNOSIS — M51.36 LUMBAR DEGENERATIVE DISC DISEASE: ICD-10-CM

## 2021-08-03 DIAGNOSIS — M51.27 HERNIATED NUCLEUS PULPOSUS, L5-S1: Primary | ICD-10-CM

## 2021-08-03 PROCEDURE — G8427 DOCREV CUR MEDS BY ELIG CLIN: HCPCS | Performed by: ORTHOPAEDIC SURGERY

## 2021-08-03 PROCEDURE — 99214 OFFICE O/P EST MOD 30 MIN: CPT | Performed by: ORTHOPAEDIC SURGERY

## 2021-08-03 PROCEDURE — 1036F TOBACCO NON-USER: CPT | Performed by: ORTHOPAEDIC SURGERY

## 2021-08-03 PROCEDURE — G8417 CALC BMI ABV UP PARAM F/U: HCPCS | Performed by: ORTHOPAEDIC SURGERY

## 2021-08-03 NOTE — PROGRESS NOTES
New Patient: LUMBAR SPINE    Referring Provider:  Radha Kimball PA-C    CHIEF COMPLAINT:    Chief Complaint   Patient presents with    Back Pain     lumbar       HISTORY OF PRESENT ILLNESS:    Ms. Sherie Opitz  is a pleasant 53 y.o. female here for evaluation regarding her LBP. Patient was last seen on 7/16/2021 by Day Robledo PA-C, where she was diagnosed with L5-S1 disc herniation and degenerative disc disease. Patient is presently complaining of low back pain at her waistline that radiates to the left and right with very seldom radiation of pain into either lower extremity. Pain is worse with prolonged standing, prolonged sitting, changing positions, and improved with changing position and lying down. Patient states that her typical daily back pain ranges anywhere from a 2 to a 10/10. Patient had already attempted conservative measures including: rest, ice, elevation, bracing, physical therapy, aquatic therapy, home exercises, activity modification, NSAIDs as needed for pain, corticosteroids, muscle relaxers, narcotic pain medication, and epidural injections. Patient continued these conservative measures including physical therapy and aquatic therapy. She has had chiropractic adjustments in the past with little relief. An attempt to further decrease her pain patient was seen by Dr. Ankit Jensen for epidural injections. Patient received these injections which she states only worsened her condition. Patient states she has a difficult time finding a comfortable position when seated or lying down. Back pain is exacerbated when she has been in a sedentary position for a long period of time or standing/walking for a long period of time. She is finding it difficulty to conduct her work duties and states that she can only work for approximately 5 hours a day. Her goal at this time is to be able to work an 8-hour shift with tolerable to no pain. She denies any new injury.   She denies any change in bowel Allergies:  Latex, Bee pollen, and Lime flavor [flavoring agent]  Social History:    reports that she quit smoking about 33 years ago. Her smoking use included cigarettes. She quit after 1.00 year of use. She has never used smokeless tobacco. She reports that she does not drink alcohol and does not use drugs. Family History:   Family History   Problem Relation Age of Onset    Heart Disease Father     Other Father         LILIAN    Diabetes Father     Alcohol Abuse Father     Obesity Father     Alzheimer's Disease Other         Grandparents    Breast Cancer Other         Aunt    Lung Cancer Other         Grandma    Colon Cancer Other         Grandpa    Diabetes Other         Grandma    Seizures Sister         Uncle, son       REVIEW OF SYSTEMS: Full ROS noted & scanned   CONSTITUTIONAL: Denies unexplained weight loss, fevers, chills or fatigue  NEUROLOGICAL: Denies unsteady gait or progressive weakness  MUSCULOSKELETAL: Denies joint swelling or redness  PSYCHOLOGICAL: Denies anxiety, depression   SKIN: Denies skin changes, delayed healing, rash, itching   HEMATOLOGIC: Denies easy bleeding or bruising  ENDOCRINE: Denies excessive thirst, urination, heat/cold  RESPIRATORY: Denies current dyspnea, cough  GI: Denies nausea, vomiting, diarrhea   : Denies bowel or bladder issues      PHYSICAL EXAM:    Vitals: Height 5' 7.01\" (1.702 m), weight 220 lb (99.8 kg), last menstrual period 11/02/2016, not currently breastfeeding. GENERAL EXAM:  · General Apparence: Patient is adequately groomed with no evidence of malnutrition. · Orientation: The patient is oriented to time, place and person. · Mood & Affect:The patient's mood and affect are appropriate. · Vascular: Examination reveals no swelling tenderness in upper or lower extremities. Good capillary refill.   · Lymphatic: The lymphatic examination bilaterally reveals all areas to be without enlargement or induration  · Sensation: Sensation is intact without deficit  · Coordination/Balance: Good coordination       LUMBAR/SACRAL EXAMINATION:  · Inspection: Local inspection shows no step-off or bruising. Lumbar alignment is normal.  Sagittal and Coronal balance is neutral.  Patient sitting comfortably in her chair. · Palpation: Tenderness at midline and distal lumbar spine from approximately T11-S1. Palpable muscle spasms to the paraspinal muscles of the lumbar spine. There is no step-off. · Range of Motion: 30 degrees of flexion, 10 degrees extension. 10 left and right rotation. Pain at all end ranges  · Strength:   Grossly intact  · Special Tests:   Straight leg raise and crossed SLR positive. Slump test negative,    · Skin: There are no rashes, ulcerations or lesions. · Reflexes: Reflexes are symmetrically 2+ at the patellar and ankle tendons. Clonus absent bilaterally at the feet. · Gait & station: Antalgic, patient ambulates without assistance or assistive device  · Additional Examinations:   · RIGHT LOWER EXTREMITY: Inspection/examination of the right lower extremity does not show any tenderness, deformity or injury. Range of motion is unremarkable. There is no gross instability. There are no rashes, ulcerations or lesions. Strength and tone are decreased. · LEFT LOWER EXTREMITY:  Inspection/examination of the left lower extremity does not show any tenderness, deformity or injury. Range of motion is unremarkable. There is no gross instability. There are no rashes, ulcerations or lesions. Strength and tone are decreased. Diagnostic Testing:    MRI that was obtained on 6/3/2021 was reviewed with the patient which shows  Impression   1. No significant spinal canal stenosis. 2. Neural foraminal narrowing at L4-L5 and L5-S1.   3. Disc desiccation with slight loss of disc space height at L5-S1. Impression:   DDD lumbar spine  Mild neuroforaminal narrowing L4-5 and L5-S1    Plan:     Today we discussed the diagnosis and treatment options patient is to continue with her care being provided by Dr. Lincoln Otoole since she has no surgical pathology at the present time.

## 2021-08-04 NOTE — TELEPHONE ENCOUNTER
From: Haroon Zabala  To: Bert Dominguez PA-C  Sent: 8/3/2021 9:45 AM EDT  Subject: Non-Urgent Medical Question    I just saw Dr Bertha Cueto. I'm not a candidate for surgery.

## 2021-08-06 DIAGNOSIS — M54.50 LOW BACK PAIN, UNSPECIFIED BACK PAIN LATERALITY, UNSPECIFIED CHRONICITY, UNSPECIFIED WHETHER SCIATICA PRESENT: ICD-10-CM

## 2021-08-06 DIAGNOSIS — M51.36 DISC DEGENERATION, LUMBAR: ICD-10-CM

## 2021-08-06 DIAGNOSIS — M51.36 LUMBAR DEGENERATIVE DISC DISEASE: ICD-10-CM

## 2021-08-06 DIAGNOSIS — M51.27 HERNIATED NUCLEUS PULPOSUS, L5-S1: Primary | ICD-10-CM

## 2021-08-06 RX ORDER — HYDROCODONE BITARTRATE AND ACETAMINOPHEN 5; 325 MG/1; MG/1
1 TABLET ORAL EVERY 4 HOURS PRN
Qty: 18 TABLET | Refills: 0 | Status: SHIPPED | OUTPATIENT
Start: 2021-08-06 | End: 2021-08-09

## 2021-08-06 NOTE — TELEPHONE ENCOUNTER
Spoke with the patient today. Patient states she is still exhibiting back pain that is now radiating into the buttock. She saw Dr. Dianna Anaya for thorough physical evaluation. He noted that she is not currently a surgical candidate. Patient is finding it difficult to find a comfortable position as well as conduct her duties at work. Patient denies any change in her symptoms otherwise. She denies any fevers, chills, night sweats, IV drug use, saddle anesthesia, or changes in bowel or bladder habits. I recommended to the patient that she follow-up with Tad Yeager PA-C for second opinion and thorough evaluation given her extensive expertise in back and spine care. Should be able to follow-up with this provider in 1 week. Until then the patient is being provided a small prescription of Norco for breakthrough pain. Patient was educated on appropriate utilization of this medication. Risks and benefits reviewed.     Narcotic   220   Sedative   100   Stimulant   000           Alexy Kelsey PA-C    Physician Assistant - Certified  [facility] and [address] [place] [address]    08/06/21 2:53 PM

## 2021-08-10 ENCOUNTER — OFFICE VISIT (OUTPATIENT)
Dept: ORTHOPEDIC SURGERY | Age: 51
End: 2021-08-10
Payer: MEDICAID

## 2021-08-10 VITALS — BODY MASS INDEX: 34.53 KG/M2 | HEIGHT: 67 IN | WEIGHT: 220 LBS

## 2021-08-10 DIAGNOSIS — M48.061 FORAMINAL STENOSIS OF LUMBAR REGION: Primary | ICD-10-CM

## 2021-08-10 DIAGNOSIS — M53.3 SACROILIAC JOINT DYSFUNCTION OF BOTH SIDES: ICD-10-CM

## 2021-08-10 DIAGNOSIS — M51.36 DDD (DEGENERATIVE DISC DISEASE), LUMBAR: ICD-10-CM

## 2021-08-10 PROCEDURE — 1036F TOBACCO NON-USER: CPT | Performed by: STUDENT IN AN ORGANIZED HEALTH CARE EDUCATION/TRAINING PROGRAM

## 2021-08-10 PROCEDURE — G8427 DOCREV CUR MEDS BY ELIG CLIN: HCPCS | Performed by: STUDENT IN AN ORGANIZED HEALTH CARE EDUCATION/TRAINING PROGRAM

## 2021-08-10 PROCEDURE — 99214 OFFICE O/P EST MOD 30 MIN: CPT | Performed by: STUDENT IN AN ORGANIZED HEALTH CARE EDUCATION/TRAINING PROGRAM

## 2021-08-10 PROCEDURE — G8417 CALC BMI ABV UP PARAM F/U: HCPCS | Performed by: STUDENT IN AN ORGANIZED HEALTH CARE EDUCATION/TRAINING PROGRAM

## 2021-08-10 NOTE — LETTER
[address] [place] [address]  Phone: [phone]  Fax: [phone], [place]        August 10, 2021     Patient: Aleck Skiff   YOB: 1970   Date of Visit: 8/10/2021       To Whom It May Concern: It is my medical opinion that Samuel [place] should remain out of work until 9/1/21 until the epidural steroid injection and possible further intervention is complete. If you have any questions or concerns, please don't hesitate to call.     Sincerely,          SARI Danielson

## 2021-08-10 NOTE — PROGRESS NOTES
Follow up: Pink Gosselin  1970  [id number]      CHIEF COMPLAINT:    Chief Complaint   Patient presents with    Follow-up     talk about other options for my back         HISTORY OF PRESENT ILLNESS:  Ms. Justa Hernandez is a 53 y.o. female returns for a follow up visit for multiple medical problems. Her current presenting problems are   1. Foraminal stenosis of lumbar region    2. DDD (degenerative disc disease), lumbar    3. Sacroiliac joint dysfunction of both sides    . As per information/history obtained from the PADT(patient assessment and documentation tool) - She complains of pain in the lower back and buttocks with radiation to the lower leg Right She rates the pain 3/10 and describes it as aching, burning. Pain is made worse by: walking, standing, sitting, bending, lifting. She denies side effects from the current pain regimen. Patient reports that since the last follow up visit the physical functioning is worse, family/social relationships are unchanged, mood is unchanged and sleep patterns are unchanged, and that the overall functioning is worse. Patient denies neurological bowel or bladder. The patient presents for follow-up of ongoing low back and intermittent right leg pain. She was seen in the back and spine Center on 7/16/2021. At this time an epidural steroid injection was ordered however facet injections were completed at the intervention center. The patient felt worse following the facet injections. She has had epidural steroid injections in the past with some relief. She also recently saw the spinal surgeon who did not recommend surgery. The patient continues to complain of low back pain with radiation to the buttocks bilaterally and down the right leg to the calf. Pain is worse with sitting for prolonged periods or standing and walking for long period of time.   She finds it extremely difficult to perform her work duties at [employer] and states she cannot tolerate her shift without pain. She has continued physical therapy and aquatic therapy. She also utilizes activity modification, NSAIDs, muscle relaxers, and narcotic pain medication for pain relief. Patient does describe pain in the bilateral sacroiliac joints. She has not had SI joint injections in the past.  She describes joint pain elsewhere as well however she is not seen a rheumatologist.    Associated signs and symptoms:   Neurogenic bowel or bladder symptoms:  no   Perceived weakness:  no   Difficulty walking:  no              Past Medical History:   Past Medical History:   Diagnosis Date    Anxiety     Chronic back pain     Headache(784.0)     Hx of blood clots     march 2016-was on xarelto for 6months    Obstructive apnea     Seizures ([facility] [address]     states no longer  has them-last one was in 2013-june      Past Surgical History:     Past Surgical History:   Procedure Laterality Date    EPIDURAL STEROID INJECTION N/A 9/10/2019    MIDLINE LUMBAR FIVE SACRAL ONE EPIDURAL STEROID INJECTION SITE CONFIRMED BY FLUOROSCOPY performed by Alok Henderson MD at [address] Right 2003    RIGHT KNEE    KNEE SURGERY Left 2012    LASIK Bilateral 2004    PAIN MANAGEMENT PROCEDURE Bilateral 7/6/2021    BILATERAL L3,L4 MEDIAL BRANCH BLOCKS WITH FLUOROSCOPY performed by Suly Talbot MD at [facility]  11.4.16    Dr. Winchester Every       Current Medications:     Current Outpatient Medications:     tiZANidine (ZANAFLEX) 4 MG tablet, TAKE 1 TABLET BY MOUTH NIGHTLY AS NEEDED (SPASM), Disp: 30 tablet, Rfl: 0    gabapentin (NEURONTIN) 100 MG capsule, Take 1 capsule by mouth 3 times daily for 60 days.  Intended supply: 30 days, Disp: 90 capsule, Rfl: 1    Multiple Vitamins-Minerals (MULTIVITAMIN ADULT PO), Take 1 tablet by mouth daily, Disp: , Rfl:   Allergies:  Latex, Bee pollen, and Lime flavor [flavoring agent]  Social History:    reports that she quit smoking about 33 years ago. Her smoking use included cigarettes. She quit after 1.00 year of use. She has never used smokeless tobacco. She reports that she does not drink alcohol and does not use drugs. Family History:   Family History   Problem Relation Age of Onset    Heart Disease Father     Other Father         LILIAN    Diabetes Father     Alcohol Abuse Father     Obesity Father     Alzheimer's Disease Other         Grandparents    Breast Cancer Other         Aunt    Lung Cancer Other         Grandma    Colon Cancer Other         Grandpa    Diabetes Other         Grandma    Seizures Sister         Uncle, son       REVIEW OF SYSTEMS:   CONSTITUTIONAL: Denies unexplained weight loss, fevers, chills or fatigue  NEUROLOGICAL: Denies unsteady gait or progressive weakness  MUSCULOSKELETAL: Denies joint swelling or redness  GI: Denies nausea, vomiting, diarrhea   : Denies bowel or bladder issues       PHYSICAL EXAM:    Vitals: Height 5' 7.01\" (1.702 m), weight 220 lb (99.8 kg), last menstrual period 11/02/2016, not currently breastfeeding. GENERAL EXAM:  · General Apparence: Patient is adequately groomed with no evidence of malnutrition. · Psychiatric: Orientation: The patient is oriented to time, place and person. The patient's mood and affect are appropriate   · Vascular: Examination reveals no swelling and palpation reveals no tenderness in upper or lower extremities. Good capillary refill. · The lymphatic examination of the neck, axillae and groin reveals all areas to be without enlargement or induration  · Sensation is intact without deficit in the upper and lower extremities to light touch and pinprick  · Coordination of the upper and lower extremities are normal.  · RIGHT UPPER EXTREMITY:  Inspection/examination of the right upper extremity does not show any tenderness, deformity or injury. Range of motion is unremarkable and pain-free. There is no gross instability.   There are no rashes, ulcerations or lesions. Strength and tone are normal. No atrophy or abnormal movements are noted. · LEFT UPPER EXTREMITY: Inspection/examination of the left upper extremity does not show any tenderness, deformity or injury. Range of motion is unremarkable and pain-free. There is no gross instability. There are no rashes, ulcerations or lesions. Strength and tone are normal. No atrophy or abnormal movements are noted. LUMBAR/SACRAL EXAMINATION:  · Inspection: Local inspection shows no step-off or bruising. Lumbar alignment is normal. No instability is noted. · Palpation:   No evidence of tenderness at the midline. Lumbar paraspinal tenderness: Mild L4/5 and L5/S1 tenderness  Bursal tenderness Left greater trochanteric tenderness  There is no paraspinal spasm. · Range of Motion: Pain with extension and facet loading bilaterally, pain with flexion  · Strength:   Strength testing is 5/5 in all muscle groups tested. · Special Tests:   Straight leg raise positive for low back pain right and crossed SLR negative. Inder's testing is positive bilaterally. Gaenslen's test positive bilaterally. Thigh thrust test positive bilaterally. FADIR's testing is negative bilaterally. · Skin: There are no rashes, ulcerations or lesions. · Reflexes: Reflexes are symmetrically 2+ at the patellar and ankle tendons. Clonus absent bilaterally at the feet. · Gait & station: normal, patient ambulates without assistance  · Additional Examinations:  · RIGHT LOWER EXTREMITY: Inspection/examination of the right lower extremity does not show any tenderness, deformity or injury. Range of motion is normal and pain-free. There is no gross instability. There are no rashes, ulcerations or lesions. Strength and tone are normal. No atrophy or abnormal movements are noted. · LEFT LOWER EXTREMITY:  Inspection/examination of the left lower extremity does not show any tenderness, deformity or injury. Range of motion is normal and pain-free. There is no gross instability. There are no rashes, ulcerations or lesions. Strength and tone are normal. No atrophy or abnormal movements are noted. Diagnostic Testing:    MR Lumbar spine shows    L1-L2: There is no significant disc bulge, spinal canal stenosis or neural   foraminal narrowing.       L2-L3: There is no significant disc bulge, spinal canal stenosis or neural   foraminal narrowing.       L3-L4: There is no significant disc bulge, spinal canal stenosis or neural   foraminal narrowing.  Minimal bilateral facet arthrosis.       L4-L5: There is a disc bulge contacting the ventral thecal sac.  No   significant spinal canal stenosis.  Facet arthrosis contributes to minimal   bilateral neural foraminal narrowing.  Findings are slightly progressed.       L5-S1: There is a disc bulge with a central annular tear, which appears   slightly improved compared the prior exam.  There is no significant spinal   canal stenosis.  Facet arthrosis contributes to mild bilateral neural   foraminal narrowing, which may be minimally progressed. Results for orders placed or performed in visit on 07/19/21   D-DIMER, QUANTITATIVE   Result Value Ref Range    D-Dimer, Quant 307 (H) 0 - 229 ng/mL DDU     Impression:       1. Foraminal stenosis of lumbar region    2. DDD (degenerative disc disease), lumbar    3. Sacroiliac joint dysfunction of both sides        Plan:  Clinical Course: Above diagnoses are worsening    I discussed the diagnosis and the treatment options with Royce Verma today. In Summary:  The various treatment options were outlined and discussed with [place] including:  Conservative care options: physical therapy, ice, medications, bracing, and activity modification. The indications for therapeutic injections. The indications for additional imaging/laboratory studies. The indications for (possible future) interventions.      After considering the various options discussed, Royce Verma elected to pursue a course of treatment that includes the followin. Medications:  Continue anti-inflammatories with appropriate GI Precautions including to stop if develop dark tarry stools or GI upset and to take with food. 2. PT:  Encouraged to continue with HEP. 3. Further studies:  No further studies. 4. Interventional:  We discussed pursuing a right L4 and L5 transforaminal epidural steroid injection to address the pain. Radiologic imaging and symptoms confirm the pain etiology. Risks, benefits and alternatives of interventional options were discussed. These include and are not limited to bleeding, infection, spinal headache, nerve injury and lack of pain relief. The patient verbalized understanding and would like to proceed. The patient will be scheduled accordingly. If no relief with lumbar epidural steroid injection we will consider bilateral sacroiliac joint injections as the patient does have positive physical exam findings for bilateral SI joint dysfunction along with the right radicular leg pain. We may consider rheumatology referral as well if no relief with any of the interventions we plan to try due to the polyarthropathy. 5. Follow up:  4-6 weeks      Zac Garrido was instructed to call the office if her symptoms worsen or if new symptoms appear prior to the next scheduled visit. She is specifically instructed to contact the office between now & her scheduled appointment if she has concerns related to her condition or if she needs assistance in scheduling the above tests. She is welcome to call for an appointment sooner if she has any additional concerns or questions. Rita Benitez PA-C   Board Certified by the [employer] on Certification of [address] [facility]                This dictation was performed with a verbal recognition program [facility]) and it was checked for errors.  It is possible that there are still dictated errors within this office note. If so, please bring any errors to my attention for an addendum. All efforts were made to ensure that this office note is accurate.

## 2021-08-18 ENCOUNTER — OFFICE VISIT (OUTPATIENT)
Dept: PULMONOLOGY | Age: 51
End: 2021-08-18
Payer: MEDICAID

## 2021-08-18 VITALS
HEART RATE: 74 BPM | WEIGHT: 229.2 LBS | HEIGHT: 67 IN | SYSTOLIC BLOOD PRESSURE: 110 MMHG | BODY MASS INDEX: 35.97 KG/M2 | DIASTOLIC BLOOD PRESSURE: 70 MMHG | OXYGEN SATURATION: 96 %

## 2021-08-18 DIAGNOSIS — E66.9 NON MORBID OBESITY, UNSPECIFIED OBESITY TYPE: Chronic | ICD-10-CM

## 2021-08-18 DIAGNOSIS — K21.9 GASTROESOPHAGEAL REFLUX DISEASE, UNSPECIFIED WHETHER ESOPHAGITIS PRESENT: Chronic | ICD-10-CM

## 2021-08-18 DIAGNOSIS — G47.33 OBSTRUCTIVE APNEA: Primary | Chronic | ICD-10-CM

## 2021-08-18 PROCEDURE — 1036F TOBACCO NON-USER: CPT | Performed by: NURSE PRACTITIONER

## 2021-08-18 PROCEDURE — G8427 DOCREV CUR MEDS BY ELIG CLIN: HCPCS | Performed by: NURSE PRACTITIONER

## 2021-08-18 PROCEDURE — 99214 OFFICE O/P EST MOD 30 MIN: CPT | Performed by: NURSE PRACTITIONER

## 2021-08-18 PROCEDURE — G8417 CALC BMI ABV UP PARAM F/U: HCPCS | Performed by: NURSE PRACTITIONER

## 2021-08-18 ASSESSMENT — SLEEP AND FATIGUE QUESTIONNAIRES
HOW LIKELY ARE YOU TO NOD OFF OR FALL ASLEEP WHILE WATCHING TV: 1
HOW LIKELY ARE YOU TO NOD OFF OR FALL ASLEEP WHILE LYING DOWN TO REST IN THE AFTERNOON WHEN CIRCUMSTANCES PERMIT: 3
HOW LIKELY ARE YOU TO NOD OFF OR FALL ASLEEP WHEN YOU ARE A PASSENGER IN A CAR FOR AN HOUR WITHOUT A BREAK: 0
HOW LIKELY ARE YOU TO NOD OFF OR FALL ASLEEP WHILE SITTING INACTIVE IN A PUBLIC PLACE: 1
ESS TOTAL SCORE: 6
HOW LIKELY ARE YOU TO NOD OFF OR FALL ASLEEP WHILE SITTING AND TALKING TO SOMEONE: 1
HOW LIKELY ARE YOU TO NOD OFF OR FALL ASLEEP IN A CAR, WHILE STOPPED FOR A FEW MINUTES IN TRAFFIC: 0
HOW LIKELY ARE YOU TO NOD OFF OR FALL ASLEEP WHILE SITTING QUIETLY AFTER LUNCH WITHOUT ALCOHOL: 0
HOW LIKELY ARE YOU TO NOD OFF OR FALL ASLEEP WHILE SITTING AND READING: 0

## 2021-08-18 NOTE — LETTER
[facility] Sleep Medicine  [place] [address] [address] [place] [address]  Phone: [phone]  Fax: [phone]    August 18, 2021       Patient: Jamil Harrell   MR Number: [id number]   YOB: 1970   Date of Visit: 8/18/2021       Carol Leslie was seen for a follow up visit today. Here is my assessment and plan as well as an attached copy of her visit today:    Non morbid obesity, unspecified obesity type   Chronic-not stable:  Discussed importance of treating obstructive sleep apnea and getting sufficient sleep to assist with weight control. Encouraged her to work on weight loss through diet and exercise. Recommended DASH or Mediterranean diets. GERD (gastroesophageal reflux disease)--off ppi currently--s/p egd 6/16--wnl  Chronic- Stable. Discussed the importance of treating obstructive sleep apnea as part of the management of this disorder. Cont any meds per PCP and other physicians. Obstructive apnea  Chronic-Unstable: Reviewed and analyzed results of physiologic download from patient's machine and reviewed with patient. Supplies and parts as needed for her machine. These are medically necessary. Limit caffeine use after 3pm. Based on the analyzed data will change following settings: Pmin to 12. Will send order for new machine. Machine is greater than 11years old. Machine is not maintaining pressure, Delay in pressure when turning machine on. Machine is broken beyond repair. Machine is medically necessary. Follow up in 6-8 weeks. Instructed not to drive unless had 4 hrs of effective therapy for his LILIAN the night before. Reviewed the risks of under or untreated LILIAN including, but not limited to, higher risks of motor vehicle accidents, stroke, heart attacks, and death. she understands and accepts all these risks. The patient is advised to use the machine every night. Discussed the recall of [employer] devices with patient and the severity of her sleep apnea.  Discussed

## 2021-08-18 NOTE — PROGRESS NOTES
Diagnosis: [x] LILIAN (G47.33) [] CSA (G47.31) [] Apnea (G47.30)   Length of Need: [x] 15 Months [] 99 Months [] Other:   Machine (NAVID!): [] Respironics Dream Station      Auto [x] ResMed AirSense     Auto [] Other:     [x]  CPAP () [] Bilevel ()   Mode: [x] Auto [] Spontaneous    Mode: [] Auto [] Spontaneous         APAP - Settings  Pressure Min: 12 cmH2O  Pressure Max: 20 cmH2O               Comfort Settings: Ramp Pressure: 5 cmH2O  Ramp time: 15 min  Flex/EPR - 3 full time                                  For ResMed Bileve (TiMax-4 sec   TiMin- 0.2 sec)     Humidifier: [x] Heated ()        [x] Water chamber replacement ()/ 1 per 6 months        Mask:   [x] Nasal () /1 per 3 months [] Full Face () /1 per 3 months   [x] Patient choice -Size and fit mask [] Patient Choice - Size and fit mask   [] Dispense: [] Dispense:   [x] Headgear () / 1 per 3 months [] Headgear () / 1 per 3 months   [x] Replacement Nasal Cushion ()/2 per month [] Interface Replacement ()/1 per month   [] Replacement Nasal Pillows ()/2 per month         Tubing: [x] Heated ()/1 per 3 months    [] Standard ()/1 per 3 months [] Other:           Filters: [x] Non-disposable ()/1 per 6 months     [x] Ultra-Fine, Disposable ()/2 per month        Miscellaneous: [] Chin Strap ()/ 1 per 6 months [] O2 bleed-in:        LPM   [] Oxymetry on CPAP/Bilevel []  Other:         Start Order Date: 08/18/21    MEDICAL JUSTIFICATION:  I, the undersigned, certify that the above prescribed supplies are medically necessary for this patients wellbeing. In my opinion, the supplies are both reasonable and necessary in reference to accepted standards of medicalpractice in treatment of this patients condition.     Joao Rater NP    NPI: [id number]       Order Signed Date: 08/18/21  [address] [facility]  Pulmonary, Sleep, and Critical Care Pulmonary, Sleep, and Critical Care  [address] [facility]. [address] [facility], [address] [facility] , [address]                                    Kwame Dean  Phone: [phone]    Fax: [phone]    Zac Radhika  1970  [address]  [phone] (home)   [phone] (mobile)      Insurance Info (confirm with patient if correct):  Payor/Plan Subscr  Sex Relation Sub. Ins. ID Effective Group Num   1. [address] [facility] 1970 Female  [id number] Not Eff [id number]                                   [address]   2.  [address] [facility] 1970 Female Self [id number]                                   [address] [facility]

## 2021-08-18 NOTE — ASSESSMENT & PLAN NOTE
Chronic-Unstable: Reviewed and analyzed results of physiologic download from patient's machine and reviewed with patient. Supplies and parts as needed for her machine. These are medically necessary. Limit caffeine use after 3pm. Based on the analyzed data will change following settings: Pmin to 12. Will send order for new machine. Machine is greater than 11years old. Machine is not maintaining pressure, Delay in pressure when turning machine on. Machine is broken beyond repair. Machine is medically necessary. Follow up in 6-8 weeks. Instructed not to drive unless had 4 hrs of effective therapy for his LILIAN the night before. Reviewed the risks of under or untreated LILIAN including, but not limited to, higher risks of motor vehicle accidents, stroke, heart attacks, and death. she understands and accepts all these risks. The patient is advised to use the machine every night. Discussed the recall of Repironics devices with patient and the severity of her sleep apnea. Discussed the risks of untreated sleep apnea including but not limited to car accidents, heart attacks, strokes, and death. Alternative therapy may not exist or may be severely limited. Felt the benefits of continued usage of these devices may outweigh the risks identified in the recall notification. Advised to avoid use of unproven cleaning methods, such as ozone. Due to the unknown variables each patient will have to make a determination in his/her own. Encouraged patient to register her machine for the recall and provided phone number. Verbal and written instruction on PAP equipment cleaning and disinfection schedule provided.

## 2021-08-18 NOTE — PROGRESS NOTES
Bryn Conn MD, Marcia Merino, [facility]  Tiffanie Kehrt CNP Ranjeramy Tahmina ESPINOSA [place] [address]  Dino Perez [address], [address] [facility]- [phone]   [facility] [address] Dino Perez. [address] [facility]. Kamin[address] [phone]     [address] [facility] [address] [phone]      Assessment/Plan:     1. Obstructive apnea  Assessment & Plan:  Chronic-Unstable: Reviewed and analyzed results of physiologic download from patient's machine and reviewed with patient. Supplies and parts as needed for her machine. These are medically necessary. Limit caffeine use after 3pm. Based on the analyzed data will change following settings: Pmin to 12. Will send order for new machine. Machine is greater than 11years old. Machine is not maintaining pressure, Delay in pressure when turning machine on. Machine is broken beyond repair. Machine is medically necessary. Follow up in 6-8 weeks. Instructed not to drive unless had 4 hrs of effective therapy for his LILIAN the night before. Reviewed the risks of under or untreated LILIAN including, but not limited to, higher risks of motor vehicle accidents, stroke, heart attacks, and death. she understands and accepts all these risks. The patient is advised to use the machine every night. Discussed the recall of Repironics devices with patient and the severity of her sleep apnea. Discussed the risks of untreated sleep apnea including but not limited to car accidents, heart attacks, strokes, and death. Alternative therapy may not exist or may be severely limited. Felt the benefits of continued usage of these devices may outweigh the risks identified in the recall notification. Advised to avoid use of unproven cleaning methods, such as ozone. Due to the unknown variables each patient will have to make a determination in his/her own. Encouraged patient to register her machine for the recall and provided phone number. Verbal and written instruction on PAP equipment cleaning and disinfection schedule provided. 2. Non morbid obesity, unspecified obesity type  Assessment & Plan:   Chronic-not stable:  Discussed importance of treating obstructive sleep apnea and getting sufficient sleep to assist with weight control. Encouraged her to work on weight loss through diet and exercise. Recommended DASH or Mediterranean diets. 3. Gastroesophageal reflux disease, unspecified whether esophagitis present  Assessment & Plan:  Chronic- Stable. Discussed the importance of treating obstructive sleep apnea as part of the management of this disorder. Cont any meds per PCP and other physicians. Reviewed, analyzed, and documented physiologic data from patient's PAP machine. This information was analyzed to assess complexity and medical decision making in regards to further testing and management. The primary encounter diagnosis was Obstructive apnea. Diagnoses of Non morbid obesity, unspecified obesity type and Gastroesophageal reflux disease, unspecified whether esophagitis present were also pertinent to this visit. The chronic medical conditions listed are directly related to the primary diagnosis listed above. The management of the primary diagnosis affects the secondary diagnosis and vice versa. Subjective:   Subjective   Patient ID: Fermin Quintana is a 53 y.o. female. Chief Complaint   Patient presents with    Sleep Apnea       HPI:  Machine Modem/Download Info:  Compliance (hours/night): 5.75 hrs/night  % of nights >= 4 hrs: 56.7 %  Download AHI (/hour): 1.7 /HR  Average CPAP Pressure : 11.5 cmH2O      APAP - Settings  Pressure Min: 10 cmH2O  Pressure Max: 16 cmH2O                 Comfort Settings  Humidity Level (0-8): 3  Heated Tubing (Yes/No): Yes  Flex/EPR (0-3): 3 PAP Mask  Clinically Relevant Leak: Yes     Ny Baird is doing well with her machine. Still waking and tossing and turning a lot.  Use to sleep well but over the last couple of years has had multiple stressors and sleep has not been as good. Within the last year she noticed her symptoms prior to treating her Obstructive sleep apnea have returned. Has back problems and uses pillows to help her. She got a new pillow that has helped. Has gained 7 pounds over the last year despite eating well and exercising. Machine is taking longer to turn on than it use to. Pressure does not feel as strong it once was. Machine is greater than 11years old. . she denies headaches, congestion, nosebleeds, dryness, aerophagia, or drowsiness while driving. DME [facility]    Crosby - Total score: 6    Social History     Socioeconomic History    Marital status: Single     Spouse name: Not on file    Number of children: Not on file    Years of education: Not on file    Highest education level: Not on file   Occupational History    Occupation: Housekeeping      Employer: [address] [place] [address]: on [employer]   Tobacco Use    Smoking status: Former Smoker     Years: 1.00     Types: Cigarettes     Quit date: 1987     Years since quittin.8    Smokeless tobacco: Never Used    Tobacco comment: counseled on tobacco exposure avoidance   Vaping Use    Vaping Use: Never used   Substance and Sexual Activity    Alcohol use: No     Alcohol/week: 0.0 standard drinks    Drug use: No    Sexual activity: Never   Other Topics Concern    Not on file   Social History Narrative    19: Patient currently using two [employer] food panteries and [employer] pantry as needed for food shortages. She has also used financial assistance resources at her Christian, she is not eligable again for 6 months. [facility] will send via e-mail per patients request list of other local organizations that may offer food and financial assistance. Patient has also reached out to life matters and she attended one counseling session but has not rescheduled.  ACM encouraged patient to continue counseling services with life matters; ACM sent [facility] Brochure and contact number; encouraged her to reach out for other resources as well. Social Determinants of Health     Financial Resource Strain: Low Risk     Difficulty of Paying Living Expenses: Not hard at all   Food Insecurity: No Food Insecurity    Worried About Running Out of Food in the Last Year: Never true    Klever of Food in the Last Year: Never true   Transportation Needs:     Lack of Transportation (Medical):  Lack of Transportation (Non-Medical):    Physical Activity:     Days of Exercise per Week:     Minutes of Exercise per Session:    Stress:     Feeling of Stress :    Social Connections:     Frequency of Communication with Friends and Family:     Frequency of Social Gatherings with Friends and Family:     Attends Islam Services:     Active Member of Clubs or Organizations:     Attends Club or Organization Meetings:     Marital Status:    Intimate Partner Violence:     Fear of Current or Ex-Partner:     Emotionally Abused:     Physically Abused:     Sexually Abused:        Current Outpatient Medications   Medication Instructions    gabapentin (NEURONTIN) 100 mg, Oral, 3 TIMES DAILY, Intended supply: 30 days    Multiple Vitamins-Minerals (MULTIVITAMIN ADULT PO) 1 tablet, Oral, DAILY    tiZANidine (ZANAFLEX) 4 mg, Oral, NIGHTLY PRN          Vitals:  Weight BMI   Wt Readings from Last 3 Encounters:   08/18/21 229 lb 3.2 oz (104 kg)   08/10/21 220 lb (99.8 kg)   08/03/21 220 lb (99.8 kg)    Body mass index is 35.9 kg/m².      BP HR SaO2   BP Readings from Last 3 Encounters:   08/18/21 110/70   07/19/21 110/64   07/06/21 127/85    Pulse Readings from Last 3 Encounters:   08/18/21 74   07/19/21 84   07/06/21 73    SpO2 Readings from Last 3 Encounters:   08/18/21 96%   07/19/21 95%   07/06/21 97%        Electronically signed by AMARILIS Rondon on 8/18/2021 at 11:09 AM

## 2021-08-19 ENCOUNTER — TELEPHONE (OUTPATIENT)
Dept: ORTHOPEDIC SURGERY | Age: 51
End: 2021-08-19

## 2021-08-19 NOTE — TELEPHONE ENCOUNTER
HAVE COMPLETED FMLA FOR [facility] PATIENT TO COME TO [facility] TO SIGN RELEASE SO PAPERWORK CAN BE RELEASED    FAXED COMPLETED FMLA TO [address] [facility] AT [address] @ [phone]

## 2021-08-26 ENCOUNTER — TELEPHONE (OUTPATIENT)
Dept: ORTHOPEDIC SURGERY | Age: 51
End: 2021-08-26

## 2021-08-26 ENCOUNTER — OFFICE VISIT (OUTPATIENT)
Dept: ORTHOPEDIC SURGERY | Age: 51
End: 2021-08-26
Payer: MEDICAID

## 2021-08-26 DIAGNOSIS — M53.3 SACROILIAC JOINT DYSFUNCTION OF BOTH SIDES: ICD-10-CM

## 2021-08-26 DIAGNOSIS — M54.16 LUMBAR RADICULOPATHY: Primary | ICD-10-CM

## 2021-08-26 DIAGNOSIS — M48.061 FORAMINAL STENOSIS OF LUMBAR REGION: ICD-10-CM

## 2021-08-26 DIAGNOSIS — M51.36 DDD (DEGENERATIVE DISC DISEASE), LUMBAR: ICD-10-CM

## 2021-08-26 PROCEDURE — 1036F TOBACCO NON-USER: CPT | Performed by: STUDENT IN AN ORGANIZED HEALTH CARE EDUCATION/TRAINING PROGRAM

## 2021-08-26 PROCEDURE — G8427 DOCREV CUR MEDS BY ELIG CLIN: HCPCS | Performed by: STUDENT IN AN ORGANIZED HEALTH CARE EDUCATION/TRAINING PROGRAM

## 2021-08-26 PROCEDURE — G8417 CALC BMI ABV UP PARAM F/U: HCPCS | Performed by: STUDENT IN AN ORGANIZED HEALTH CARE EDUCATION/TRAINING PROGRAM

## 2021-08-26 PROCEDURE — 99213 OFFICE O/P EST LOW 20 MIN: CPT | Performed by: STUDENT IN AN ORGANIZED HEALTH CARE EDUCATION/TRAINING PROGRAM

## 2021-08-26 NOTE — LETTER
[address] [facility],First Floor [address]  Phone: [phone]  Fax: [phone]    Deonte Kasper        August 26, 2021     Patient: Penelope Serrano   YOB: 1970   Date of Visit: 8/26/2021       To Whom It May Concern: It is my medical opinion that Elizabeth Sen may return to light duty 9/2/21 with the following restrictions: lifting/carrying not to exceed 5 lbs. , pushing/pulling not to exceed 5 lbs. , bending/stooping not to exceed 5 x per hour, walking not to exceed greater 10 minutes at a time, restrict work hours to no more than 4 per day. If you have any questions or concerns, please don't hesitate to call.     Sincerely,          Jean Brower PA-C

## 2021-08-26 NOTE — PROGRESS NOTES
Follow up: Kamilla Agudelo  1970  [id number]      CHIEF COMPLAINT:    Chief Complaint   Patient presents with    Follow-up     FU LSP, patient is back today as her injection is still pending. Her work letter has her off work until 9/1/2021. HISTORY OF PRESENT ILLNESS:  Ms. Raudel Espitia is a 53 y.o. female returns for a follow up visit for multiple medical problems. Her current presenting problems are   1. Lumbar radiculopathy    2. Foraminal stenosis of lumbar region    3. Sacroiliac joint dysfunction of both sides    4. DDD (degenerative disc disease), lumbar    . As per information/history obtained from the PADT(patient assessment and documentation tool) - She complains of pain in the lower back with radiation to the buttocks and lower leg Right She rates the pain 4/10 and describes it as aching, burning. Pain is made worse by: movement, walking, standing, bending, lifting. She denies side effects from the current pain regimen. Patient reports that since the last follow up visit the physical functioning is unchanged, family/social relationships are unchanged, mood is unchanged and sleep patterns are unchanged, and that the overall functioning is unchanged. Patient denies neurological bowel or bladder. Presents for follow-up of ongoing low back and right leg pain. A right L4-L5 transforaminal epidural steroid injection was ordered at her last office appointment however this is still pending approval with insurance. We had placed a work letter for her to be off until 9/1/2021 in order to receive her epidural steroid injection. The patient would like to return to work at this time however she will need restrictions until the epidural steroid injection is complete. She continues to experience low back pain and right leg pain worse with walking and standing. She has pain with bending, lifting, pushing and pulling as well.     Associated signs and symptoms:   Neurogenic bowel or bladder symptoms:  no   Perceived weakness:  no   Difficulty walking:  no              Past Medical History:   Past Medical History:   Diagnosis Date    Anxiety     Chronic back pain     Headache(784.0)     Hx of blood clots     march 2016-was on xarelto for 6months    Obstructive apnea     Seizures ([facility] [address]     states no longer  has them-last one was in 2013-june      Past Surgical History:     Past Surgical History:   Procedure Laterality Date    EPIDURAL STEROID INJECTION N/A 9/10/2019    MIDLINE LUMBAR FIVE SACRAL ONE EPIDURAL STEROID INJECTION SITE CONFIRMED BY FLUOROSCOPY performed by Charline Salomon MD at [address] Right 2003    RIGHT KNEE    KNEE SURGERY Left 2012    LASIK Bilateral 2004    PAIN MANAGEMENT PROCEDURE Bilateral 7/6/2021    BILATERAL L3,L4 MEDIAL BRANCH BLOCKS WITH FLUOROSCOPY performed by Fredy Mchugh MD at [facility]  11.4.16    Dr. Erin Singh       Current Medications:     Current Outpatient Medications:     tiZANidine (ZANAFLEX) 4 MG tablet, TAKE 1 TABLET BY MOUTH NIGHTLY AS NEEDED (SPASM), Disp: 30 tablet, Rfl: 0    gabapentin (NEURONTIN) 100 MG capsule, Take 1 capsule by mouth 3 times daily for 60 days. Intended supply: 30 days, Disp: 90 capsule, Rfl: 1    Multiple Vitamins-Minerals (MULTIVITAMIN ADULT PO), Take 1 tablet by mouth daily, Disp: , Rfl:   Allergies:  Latex, Bee pollen, and Lime flavor [flavoring agent]  Social History:    reports that she quit smoking about 33 years ago. Her smoking use included cigarettes. She quit after 1.00 year of use. She has never used smokeless tobacco. She reports that she does not drink alcohol and does not use drugs.   Family History:   Family History   Problem Relation Age of Onset    Heart Disease Father     Other Father         LLIIAN    Diabetes Father     Alcohol Abuse Father     Obesity Father     Alzheimer's Disease Other         Grandparents    Breast Cancer Other         Aunt    Lung Cancer Other         Grandma    Colon Cancer Other         Grandpa    Diabetes Other         Grandma    Seizures Sister         Uncle, son       REVIEW OF SYSTEMS:   CONSTITUTIONAL: Denies unexplained weight loss, fevers, chills or fatigue  NEUROLOGICAL: Denies unsteady gait or progressive weakness  MUSCULOSKELETAL: Denies joint swelling or redness  GI: Denies nausea, vomiting, diarrhea   : Denies bowel or bladder issues       PHYSICAL EXAM:    Vitals: Last menstrual period 11/02/2016, not currently breastfeeding. GENERAL EXAM:  · General Apparence: Patient is adequately groomed with no evidence of malnutrition. · Psychiatric: Orientation: The patient is oriented to time, place and person. The patient's mood and affect are appropriate   · Vascular: Examination reveals no swelling and palpation reveals no tenderness in upper or lower extremities. Good capillary refill. · The lymphatic examination of the neck, axillae and groin reveals all areas to be without enlargement or induration  · Sensation is intact without deficit in the upper and lower extremities to light touch and pinprick  · Coordination of the upper and lower extremities are normal.  · RIGHT UPPER EXTREMITY:  Inspection/examination of the right upper extremity does not show any tenderness, deformity or injury. Range of motion is unremarkable and pain-free. There is no gross instability. There are no rashes, ulcerations or lesions. Strength and tone are normal. No atrophy or abnormal movements are noted. · LEFT UPPER EXTREMITY: Inspection/examination of the left upper extremity does not show any tenderness, deformity or injury. Range of motion is unremarkable and pain-free. There is no gross instability. There are no rashes, ulcerations or lesions. Strength and tone are normal. No atrophy or abnormal movements are noted. LUMBAR/SACRAL EXAMINATION:  · Inspection: Local inspection shows no step-off or bruising. Lumbar alignment is normal. No instability is noted. · Palpation:   No evidence of tenderness at the midline. Lumbar paraspinal tenderness: Mild L4/5 and L5/S1 tenderness  Bursal tenderness No tenderness bilaterally  There is no paraspinal spasm. · Range of Motion: limited by 25% in all planes due to pain  · Strength:   Strength testing is 5/5 in all muscle groups tested. · Special Tests:   Straight leg raise positive right and crossed SLR negative. Inder's testing is positive bilaterally. FADIR's testing is negative bilaterally. Gaenslen's test positive bilaterally. Thigh thrust test positive bilaterally. · Skin: There are no rashes, ulcerations or lesions. · Reflexes: Reflexes are symmetrically 2+ at the patellar and ankle tendons. Clonus absent bilaterally at the feet. · Gait & station: normal, patient ambulates without assistance  · Additional Examinations:  · RIGHT LOWER EXTREMITY: Inspection/examination of the right lower extremity does not show any tenderness, deformity or injury. Range of motion is normal and pain-free. There is no gross instability. There are no rashes, ulcerations or lesions. Strength and tone are normal. No atrophy or abnormal movements are noted. · LEFT LOWER EXTREMITY:  Inspection/examination of the left lower extremity does not show any tenderness, deformity or injury. Range of motion is normal and pain-free. There is no gross instability. There are no rashes, ulcerations or lesions. Strength and tone are normal. No atrophy or abnormal movements are noted.       Diagnostic Testing:    MR Lumbar spine shows    L1-L2: There is no significant disc bulge, spinal canal stenosis or neural   foraminal narrowing.       L2-L3: There is no significant disc bulge, spinal canal stenosis or neural   foraminal narrowing.       L3-L4: There is no significant disc bulge, spinal canal stenosis or neural   foraminal narrowing.  Minimal bilateral facet arthrosis.       L4-L5: There is a disc bulge contacting the ventral thecal sac.  No   significant spinal canal stenosis.  Facet arthrosis contributes to minimal   bilateral neural foraminal narrowing.  Findings are slightly progressed.       L5-S1: There is a disc bulge with a central annular tear, which appears   slightly improved compared the prior exam.  There is no significant spinal   canal stenosis.  Facet arthrosis contributes to mild bilateral neural   foraminal narrowing, which may be minimally progressed. Results for orders placed or performed in visit on 21   D-DIMER, QUANTITATIVE   Result Value Ref Range    D-Dimer, Quant 307 (H) 0 - 229 ng/mL DDU     Impression:       1. Lumbar radiculopathy    2. Foraminal stenosis of lumbar region    3. Sacroiliac joint dysfunction of both sides    4. DDD (degenerative disc disease), lumbar        Plan:  Clinical Course: shows no change    I discussed the diagnosis and the treatment options with Anya Thien today. In Summary:  The various treatment options were outlined and discussed with [place] including:  Conservative care options: physical therapy, ice, medications, bracing, and activity modification. The indications for therapeutic injections. The indications for additional imaging/laboratory studies. The indications for (possible future) interventions. After considering the various options discussed, Anya Neumann elected to pursue a course of treatment that includes the followin. Medications:  Continue anti-inflammatories with appropriate GI Precautions including to stop if develop dark tarry stools or GI upset and to take with food. 2. PT:  Encouraged to continue with HEP. 3. Further studies:  No further studies. 4. Interventional:  Waiting on approval from insurance company for [facility] SERVICES injection    5. Follow up:  2-3 weeks      Anya Neumann was instructed to call the office if her symptoms worsen or if new symptoms appear prior to the next scheduled visit. She is specifically instructed to contact the office between now & her scheduled appointment if she has concerns related to her condition or if she needs assistance in scheduling the above tests. She is welcome to call for an appointment sooner if she has any additional concerns or questions. Nikolay Fortune PA-C   Board Certified by the [employer] on Certification of [address] and Orthopaedics                This dictation was performed with a verbal recognition program [facility]) and it was checked for errors. It is possible that there are still dictated errors within this office note. If so, please bring any errors to my attention for an addendum. All efforts were made to ensure that this office note is accurate.

## 2021-09-10 ENCOUNTER — HOSPITAL ENCOUNTER (OUTPATIENT)
Dept: INTERVENTIONAL RADIOLOGY/VASCULAR | Age: 51
Discharge: HOME | End: 2021-09-10
Payer: MEDICAID

## 2021-09-10 DIAGNOSIS — M51.36 DDD (DEGENERATIVE DISC DISEASE), LUMBAR: ICD-10-CM

## 2021-09-10 PROCEDURE — 64484 NJX AA&/STRD TFRM EPI L/S EA: CPT

## 2021-09-10 PROCEDURE — 64483 NJX AA&/STRD TFRM EPI L/S 1: CPT

## 2021-09-10 PROCEDURE — 6360000004 HC RX CONTRAST MEDICATION: Performed by: RADIOLOGY

## 2021-09-10 PROCEDURE — 2709999900 HC NON-CHARGEABLE SUPPLY

## 2021-09-10 PROCEDURE — 62323 NJX INTERLAMINAR LMBR/SAC: CPT

## 2021-09-10 RX ADMIN — IOHEXOL 10 ML: 180 INJECTION INTRAVENOUS at 13:58

## 2021-09-13 ENCOUNTER — TELEPHONE (OUTPATIENT)
Dept: FAMILY MEDICINE CLINIC | Age: 51
End: 2021-09-13

## 2021-09-13 NOTE — TELEPHONE ENCOUNTER
Pt calling stating she would like a letter for her epilepsy. Stated she has not seen Dr. Barbara Walker for this issue. Advised since it has been over 30 days from being dismissed she would need to find a new primary care provider.

## 2021-09-22 ENCOUNTER — OFFICE VISIT (OUTPATIENT)
Dept: ORTHOPEDIC SURGERY | Age: 51
End: 2021-09-22
Payer: MEDICAID

## 2021-09-22 VITALS
HEIGHT: 67 IN | SYSTOLIC BLOOD PRESSURE: 115 MMHG | DIASTOLIC BLOOD PRESSURE: 54 MMHG | BODY MASS INDEX: 35.94 KG/M2 | WEIGHT: 229 LBS | HEART RATE: 75 BPM

## 2021-09-22 DIAGNOSIS — M54.16 LUMBAR RADICULOPATHY: Primary | ICD-10-CM

## 2021-09-22 DIAGNOSIS — M54.50 LOW BACK PAIN, UNSPECIFIED BACK PAIN LATERALITY, UNSPECIFIED CHRONICITY, UNSPECIFIED WHETHER SCIATICA PRESENT: ICD-10-CM

## 2021-09-22 DIAGNOSIS — M48.061 FORAMINAL STENOSIS OF LUMBAR REGION: ICD-10-CM

## 2021-09-22 PROCEDURE — 1036F TOBACCO NON-USER: CPT | Performed by: ORTHOPAEDIC SURGERY

## 2021-09-22 PROCEDURE — G8417 CALC BMI ABV UP PARAM F/U: HCPCS | Performed by: ORTHOPAEDIC SURGERY

## 2021-09-22 PROCEDURE — G8427 DOCREV CUR MEDS BY ELIG CLIN: HCPCS | Performed by: ORTHOPAEDIC SURGERY

## 2021-09-22 PROCEDURE — 99214 OFFICE O/P EST MOD 30 MIN: CPT | Performed by: ORTHOPAEDIC SURGERY

## 2021-09-22 RX ORDER — PREDNISONE 1 MG/1
5 TABLET ORAL 2 TIMES DAILY
Qty: 20 TABLET | Refills: 0 | Status: SHIPPED | OUTPATIENT
Start: 2021-09-22 | End: 2022-08-09

## 2021-09-22 NOTE — LETTER
[address] [place] [address]  Eugene Alvarado [address] [place] [id number]  Phone: [phone]  Fax: [phone]    Andreas Skaggs MD        September 22, 2021     Patient: Shiv Owen   YOB: 1970   Date of Visit: 9/22/2021       To Whom It May Concern: It is my medical opinion that Maury Kaur may return to work on 09/23/21 with the following restrictions: No more than 4 hours per day, no lifting more than 5 pounds. Restrictions for the next 4 weeks, will be re-evaluated at that time. If you have any questions or concerns, please don't hesitate to call.     Sincerely,      Andreas Skaggs MD

## 2021-09-22 NOTE — LETTER
[facility]  Eugene Lealstthomas 238 [address] [facility],First Floor [address]  Phone: [phone]  Fax: [phone]    Thelma Chen MD         September 22, 2021     Patient: Vic Recinos   YOB: 1970   Date of Visit: 9/22/2021       To Whom It May Concern: It is my medical opinion that Charla Cevallos requires a disability parking placard for the following reasons:  She cannot walk 200 feet without stopping to rest.  Duration of need: 5 years    If you have any questions or concerns, please don't hesitate to call.     Sincerely,        Thelma Chen MD

## 2021-09-22 NOTE — PROGRESS NOTES
Date:  2021    Name:  Matthew Kirby  Address:  [address] [place] [address]    :  1970      Age:   53 y.o.    SSN:  [id number]      Medical Record Number:  [id number]    Reason for Visit:    Chief Complaint    Back Pain (L-SPINE-Still having pain)      DOS:      HPI: Sharmaine Crow is a 53 y.o. female here today for continued complaints of pain in the right side. Tight restrictions at work. Has missed three weeks of work release. 4 hours of work nothing over 5 pounds. Pain post injection is greater than what she has prior . Pain is related to bilateral legs. Give way of the bilateral legs. More limited activity has increased her weight by 10 + pounds over the past month     Pain in the low back. Pinching sensations of the bilateral legs posteriorly. Review of Systems:  Review of Systems   Constitutional: Negative for chills and fever. HENT: Negative for nosebleeds. Eyes: Negative for double vision. Cardiovascular: Negative for chest pain. Gastrointestinal: Negative for abdominal pain. Musculoskeletal: Positive for joint pain and myalgias. Skin: Negative for rash. Neurological: Negative for seizures. Psychiatric/Behavioral: Negative for hallucinations.         Past History:  Past Medical History:   Diagnosis Date    Anxiety     Chronic back pain     Headache(784.0)     Hx of blood clots     2016-was on xarelto for 6months    Obstructive apnea     Seizures ([facility] [address]     states no longer  has them-last one was in -     Past Surgical History:   Procedure Laterality Date    EPIDURAL STEROID INJECTION N/A 9/10/2019    MIDLINE LUMBAR FIVE SACRAL ONE EPIDURAL STEROID INJECTION SITE CONFIRMED BY FLUOROSCOPY performed by Tyronne Snellen, MD at [facility] Right     RIGHT KNEE    KNEE SURGERY Left     LASIK Bilateral 2004    PAIN MANAGEMENT PROCEDURE Bilateral 2021    BILATERAL L3,L4 MEDIAL BRANCH BLOCKS WITH FLUOROSCOPY performed by Reji Fleming MD at [facility]  11.4.16    Dr. Marii Estrada       Current Outpatient Medications on File Prior to Visit   Medication Sig Dispense Refill    tiZANidine (ZANAFLEX) 4 MG tablet TAKE 1 TABLET BY MOUTH NIGHTLY AS NEEDED (SPASM) (Patient not taking: Reported on 2021) 30 tablet 0    gabapentin (NEURONTIN) 100 MG capsule Take 1 capsule by mouth 3 times daily for 60 days. Intended supply: 30 days 90 capsule 1    Multiple Vitamins-Minerals (MULTIVITAMIN ADULT PO) Take 1 tablet by mouth daily       No current facility-administered medications on file prior to visit. Social History     Socioeconomic History    Marital status: Single     Spouse name: Not on file    Number of children: Not on file    Years of education: Not on file    Highest education level: Not on file   Occupational History    Occupation: Housekeeping      Employer: [employer]: on [employer]   Tobacco Use    Smoking status: Former Smoker     Years: 1.00     Types: Cigarettes     Quit date: 1987     Years since quittin.9    Smokeless tobacco: Never Used    Tobacco comment: counseled on tobacco exposure avoidance   Vaping Use    Vaping Use: Never used   Substance and Sexual Activity    Alcohol use: No     Alcohol/week: 0.0 standard drinks    Drug use: No    Sexual activity: Never   Other Topics Concern    Not on file   Social History Narrative    19: Patient currently using two [employer] food panteries and [employer] pantry as needed for food shortages. She has also used financial assistance resources at her Religious, she is not eligable again for 6 months. [facility] will send via e-mail per patients request list of other local organizations that may offer food and financial assistance. Patient has also reached out to life matters and she attended one counseling session but has not rescheduled.  ACM encouraged patient to continue daily       No current facility-administered medications for this visit. Allergies: Allergies   Allergen Reactions    Latex Rash    Bee Pollen Swelling    Lime Flavor [Flavoring Agent] Swelling     Swelling of mouth       Physical Exam:  Vitals:    09/22/21 1437   BP: (!) 115/54   Pulse: 75       Physical Exam   Constitutional: Patient is oriented to person, place, and time and well-developed, well-nourished, and in no distress. HENT:   Head: Normocephalic and atraumatic. Eyes: Pupils are equal, round, and reactive to light. Neck: No tracheal deviation present. No thyromegaly present. Pulmonary/Chest: Effort normal.   Abdominal: Soft. There is no guarding. Musculoskeletal: Patient exhibits tenderness and pain. Neurological: Patient is alert and oriented to person, place, and time. Skin: Skin is warm. Psychiatric: Affect normal.     General: Ny Bacon is a healthy and well appearing 53y.o. year old female who is sitting comfortably in our office in acute distress. Alert and oriented. Physical Exam:  RUE:    No gross deformities noted. Sensation is intact to light touch throughout the median, ulnar and radial nerve distribution. Able to wiggle fingers, gives thumbs up, A-okay and cross index and middle fingers. Full range of motion of the hand, wrist, elbow and shoulder. LUE:   No gross deformities noted. Sensation is intact to light touch throughout the median, ulnar and radial nerve distribution. Able to wiggle fingers, gives thumbs up, A-okay and cross index and middle fingers. Full range of motion of the hand wrist elbow and shoulder. RLE:moving hip and knee well. Limited back motions with less hamstring irritation on the right than the left. Knee stable. Back facet maneuvers  Irritable. No gross deformities noted. Sensation is intact to light touch throughout the lower extremity. Able to wiggle toes and plantar and dorsiflex foot.   Full range of motion at the ankle, knee and hip    LLE:    Left hipwith some limited internal rotation and no major pain in the left groin. No gross deformities noted. Sensation is intact to light touch throughout the lower extremity. Able to wiggle toes and plantar and dorsiflex foot. Full range of motion at the ankle, knee and hip      Walking with difficulty wide based short stride length gait. Some pain with palpation over the sciatic nerve. Diagnostics:  Xray   Have reviewed the xrays above from 09/22/21   and my impression is:  1. Lumbar radiculopathy       2. Foraminal stenosis of lumbar region       3. Low back pain, unspecified back pain laterality, unspecified chronicity, unspecified whether sciatica present         Assessment: additioial compalnts of back pain which appears to have increased since last injection. She has returned with limited work duty which has been difficult for her. She wants to [place] current restrictions and continue handicapped marking. Plan: neurontin and steroids for her neurogenic pain. Potentially she should be able to manage progression of activity as her pain decresaes. Work towards additional therapy and weight loss when her mobility and pain improves. No additional ijnections at this point.          [unfilled]     Dar Katz MD    Date:    9/22/2021

## 2021-10-26 ENCOUNTER — TELEPHONE (OUTPATIENT)
Dept: PULMONOLOGY | Age: 51
End: 2021-10-26

## 2021-10-26 NOTE — TELEPHONE ENCOUNTER
Patient left a voicemail to schedule a new machine follow-up appointment. Called patient back and left message to call office to schedule.

## 2021-10-27 ENCOUNTER — OFFICE VISIT (OUTPATIENT)
Dept: ORTHOPEDIC SURGERY | Age: 51
End: 2021-10-27
Payer: MEDICAID

## 2021-10-27 VITALS
WEIGHT: 229 LBS | DIASTOLIC BLOOD PRESSURE: 67 MMHG | HEIGHT: 67 IN | SYSTOLIC BLOOD PRESSURE: 100 MMHG | HEART RATE: 78 BPM | BODY MASS INDEX: 35.94 KG/M2

## 2021-10-27 DIAGNOSIS — M54.50 LOW BACK PAIN, UNSPECIFIED BACK PAIN LATERALITY, UNSPECIFIED CHRONICITY, UNSPECIFIED WHETHER SCIATICA PRESENT: Primary | ICD-10-CM

## 2021-10-27 DIAGNOSIS — M48.061 FORAMINAL STENOSIS OF LUMBAR REGION: ICD-10-CM

## 2021-10-27 DIAGNOSIS — M54.16 LUMBAR RADICULOPATHY: ICD-10-CM

## 2021-10-27 PROCEDURE — 1036F TOBACCO NON-USER: CPT | Performed by: ORTHOPAEDIC SURGERY

## 2021-10-27 PROCEDURE — G8427 DOCREV CUR MEDS BY ELIG CLIN: HCPCS | Performed by: ORTHOPAEDIC SURGERY

## 2021-10-27 PROCEDURE — G8417 CALC BMI ABV UP PARAM F/U: HCPCS | Performed by: ORTHOPAEDIC SURGERY

## 2021-10-27 PROCEDURE — G8484 FLU IMMUNIZE NO ADMIN: HCPCS | Performed by: ORTHOPAEDIC SURGERY

## 2021-10-27 PROCEDURE — 99214 OFFICE O/P EST MOD 30 MIN: CPT | Performed by: ORTHOPAEDIC SURGERY

## 2021-10-28 NOTE — PROGRESS NOTES
Date:  10/27/2021    Name:  Silas Oakley  Address:  [address] [place] [address]    :  1970      Age:   53 y.o.    SSN:  [id number]      Medical Record Number:  [id number]    Reason for Visit:    Chief Complaint    Back Pain (L-SPINE)      DOS:      Currently. She is attempting to walk and regain some of her control of the abck and reduction of her weight . Using brace. Difficulty with work activites but she has continued work detail    HPI: Drea Trevizo is a 53 y.o. female here today for continued complaints of pain in the right side. Tight restrictions at work. Has missed three weeks of work release. 4 hours of work nothing over 5 pounds. Pain post injection is greater than what she has prior . Pain is related to bilateral legs. Give way of the bilateral legs. More limited activity has increased her weight by 10 + pounds over the past month     Pain in the low back. Pinching sensations of the bilateral legs posteriorly. Review of Systems:  Review of Systems   Constitutional: Negative for chills and fever. HENT: Negative for nosebleeds. Eyes: Negative for double vision. Cardiovascular: Negative for chest pain. Gastrointestinal: Negative for abdominal pain. Musculoskeletal: Positive for joint pain and myalgias. Skin: Negative for rash. Neurological: Negative for seizures. Psychiatric/Behavioral: Negative for hallucinations.         Past History:  Past Medical History:   Diagnosis Date    Anxiety     Chronic back pain     Headache(784.0)     Hx of blood clots     2016-was on xarelto for 6months    Obstructive apnea     Seizures ([facility] [address]     states no longer  has them-last one was in -     Past Surgical History:   Procedure Laterality Date    EPIDURAL STEROID INJECTION N/A 9/10/2019    MIDLINE LUMBAR FIVE SACRAL ONE EPIDURAL STEROID INJECTION SITE CONFIRMED BY FLUOROSCOPY performed by Siobhan Davis MD at [address] KNEE SURGERY Right     RIGHT KNEE    KNEE SURGERY Left     LASIK Bilateral 2004    PAIN MANAGEMENT PROCEDURE Bilateral 2021    BILATERAL L3,L4 MEDIAL BRANCH BLOCKS WITH FLUOROSCOPY performed by Sara Myers MD at [address] [place] [address]  11.4.16    Dr. Mahendra Levine       Current Outpatient Medications on File Prior to Visit   Medication Sig Dispense Refill    tiZANidine (ZANAFLEX) 4 MG tablet TAKE 1 TABLET BY MOUTH NIGHTLY AS NEEDED (SPASM) (Patient not taking: Reported on 2021) 30 tablet 0    gabapentin (NEURONTIN) 100 MG capsule Take 1 capsule by mouth 3 times daily for 60 days. Intended supply: 30 days 90 capsule 1    Multiple Vitamins-Minerals (MULTIVITAMIN ADULT PO) Take 1 tablet by mouth daily       No current facility-administered medications on file prior to visit. Social History     Socioeconomic History    Marital status: Single     Spouse name: Not on file    Number of children: Not on file    Years of education: Not on file    Highest education level: Not on file   Occupational History    Occupation: Housekeeping      Employer: [employer]: on [employer]   Tobacco Use    Smoking status: Former Smoker     Years: 1.00     Types: Cigarettes     Quit date: 1987     Years since quittin.9    Smokeless tobacco: Never Used    Tobacco comment: counseled on tobacco exposure avoidance   Vaping Use    Vaping Use: Never used   Substance and Sexual Activity    Alcohol use: No     Alcohol/week: 0.0 standard drinks    Drug use: No    Sexual activity: Never   Other Topics Concern    Not on file   Social History Narrative    19: Patient currently using two [employer] food panteries and [employer] pantry as needed for food shortages. She has also used financial assistance resources at her [employer], she is not eligable again for 6 months.  [facility] will send via e-mail per patients request list of other local organizations that may offer food and financial assistance. Patient has also reached out to life matters and she attended one counseling session but has not rescheduled. ACM encouraged patient to continue counseling services with life matters; ACM sent [employer] Brochure and contact number; encouraged her to reach out for other resources as well. Social Determinants of Health     Financial Resource Strain: Low Risk     Difficulty of Paying Living Expenses: Not hard at all   Food Insecurity: No Food Insecurity    Worried About Running Out of Food in the Last Year: Never true    Klever of Food in the Last Year: Never true   Transportation Needs:     Lack of Transportation (Medical):      Lack of Transportation (Non-Medical):    Physical Activity:     Days of Exercise per Week:     Minutes of Exercise per Session:    Stress:     Feeling of Stress :    Social Connections:     Frequency of Communication with Friends and Family:     Frequency of Social Gatherings with Friends and Family:     Attends [employer] Services:     Active Member of Clubs or Organizations:     Attends Club or Organization Meetings:     Marital Status:    Intimate Partner Violence:     Fear of Current or Ex-Partner:     Emotionally Abused:     Physically Abused:     Sexually Abused:      Family History   Problem Relation Age of Onset    Heart Disease Father     Other Father         LILIAN    Diabetes Father     Alcohol Abuse Father     Obesity Father     Alzheimer's Disease Other         Grandparents    Breast Cancer Other         Aunt    Lung Cancer Other         Grandma    Colon Cancer Other         Grandpa    Diabetes Other         Grandma    Seizures Sister         Uncle, son       Current Medications:    Current Outpatient Medications   Medication Sig Dispense Refill    tiZANidine (ZANAFLEX) 4 MG tablet TAKE 1 TABLET BY MOUTH NIGHTLY AS NEEDED (SPASM) (Patient not taking: Reported on 9/22/2021) 30 tablet 0    gabapentin (NEURONTIN) 100 MG capsule Take 1 capsule by mouth 3 times daily for 60 days. Intended supply: 30 days 90 capsule 1    Multiple Vitamins-Minerals (MULTIVITAMIN ADULT PO) Take 1 tablet by mouth daily       No current facility-administered medications for this visit. Allergies: Allergies   Allergen Reactions    Latex Rash    Bee Pollen Swelling    Lime Flavor [Flavoring Agent] Swelling     Swelling of mouth       Physical Exam:  Vitals:    10/27/21 1426   BP: 100/67   Pulse: 78       Physical Exam   Constitutional: Patient is oriented to person, place, and time and well-developed, well-nourished, and in no distress. HENT:   Head: Normocephalic and atraumatic. Eyes: Pupils are equal, round, and reactive to light. Neck: No tracheal deviation present. No thyromegaly present. Pulmonary/Chest: Effort normal.   Abdominal: Soft. There is no guarding. Musculoskeletal: Patient exhibits tenderness and pain. Neurological: Patient is alert and oriented to person, place, and time. Skin: Skin is warm. Psychiatric: Affect normal.     General: Ny Shook is a healthy and well appearing 53y.o. year old female who is sitting comfortably in our office in acute distress. Alert and oriented. Physical Exam:  RUE:    No gross deformities noted. Sensation is intact to light touch throughout the median, ulnar and radial nerve distribution. Able to wiggle fingers, gives thumbs up, A-okay and cross index and middle fingers. Full range of motion of the hand, wrist, elbow and shoulder. LUE:   No gross deformities noted. Sensation is intact to light touch throughout the median, ulnar and radial nerve distribution. Able to wiggle fingers, gives thumbs up, A-okay and cross index and middle fingers. Full range of motion of the hand wrist elbow and shoulder. RLE:moving hip and knee well. Limited back motions with less hamstring irritation on the right than the left. Knee stable.    Back facet maneuvers Irritable. No gross deformities noted. Sensation is intact to light touch throughout the lower extremity. Able to wiggle toes and plantar and dorsiflex foot. Full range of motion at the ankle, knee and hip    LLE:    Left hipwith some limited internal rotation and no major pain in the left groin. No gross deformities noted. Sensation is intact to light touch throughout the lower extremity. Able to wiggle toes and plantar and dorsiflex foot. Full range of motion at the ankle, knee and hip      Walking with difficulty wide based short stride length gait. Some pain with palpation over the sciatic nerve. Diagnostics:  Xray   Have reviewed the xrays above from 10/27/21   and my impression is:  1. Lumbar radiculopathy       2. Foraminal stenosis of lumbar region       3. Low back pain, unspecified back pain laterality, unspecified chronicity, unspecified whether sciatica present         Assessment: additioial compalnts of back pain which appears to have increased since last injection. She has returned with limited work duty which has been difficult for her. She wants to [place] current restrictions and continue handicapped marking. Plan: neurontin and smuscle relaxats for her neurogenic pain. Potentially she should be able to manage progression of activity as her pain decresaes. Work towards additional therapy and weight loss when her mobility and pain improves. No additional ijnections at this point.          [unfilled]     Anu Guerrero MD    Date:    10/27/2021

## 2021-12-08 ENCOUNTER — OFFICE VISIT (OUTPATIENT)
Dept: PULMONOLOGY | Age: 51
End: 2021-12-08
Payer: MEDICAID

## 2021-12-08 VITALS
BODY MASS INDEX: 36.7 KG/M2 | WEIGHT: 233.8 LBS | DIASTOLIC BLOOD PRESSURE: 80 MMHG | HEIGHT: 67 IN | SYSTOLIC BLOOD PRESSURE: 124 MMHG | OXYGEN SATURATION: 99 % | HEART RATE: 81 BPM

## 2021-12-08 DIAGNOSIS — E66.9 NON MORBID OBESITY, UNSPECIFIED OBESITY TYPE: Chronic | ICD-10-CM

## 2021-12-08 DIAGNOSIS — F41.9 ANXIETY: Chronic | ICD-10-CM

## 2021-12-08 DIAGNOSIS — K21.9 GASTROESOPHAGEAL REFLUX DISEASE, UNSPECIFIED WHETHER ESOPHAGITIS PRESENT: Chronic | ICD-10-CM

## 2021-12-08 DIAGNOSIS — G47.33 OBSTRUCTIVE APNEA: Primary | Chronic | ICD-10-CM

## 2021-12-08 PROCEDURE — 3017F COLORECTAL CA SCREEN DOC REV: CPT | Performed by: NURSE PRACTITIONER

## 2021-12-08 PROCEDURE — 99214 OFFICE O/P EST MOD 30 MIN: CPT | Performed by: NURSE PRACTITIONER

## 2021-12-08 PROCEDURE — G8427 DOCREV CUR MEDS BY ELIG CLIN: HCPCS | Performed by: NURSE PRACTITIONER

## 2021-12-08 PROCEDURE — 1036F TOBACCO NON-USER: CPT | Performed by: NURSE PRACTITIONER

## 2021-12-08 PROCEDURE — G8484 FLU IMMUNIZE NO ADMIN: HCPCS | Performed by: NURSE PRACTITIONER

## 2021-12-08 PROCEDURE — G8417 CALC BMI ABV UP PARAM F/U: HCPCS | Performed by: NURSE PRACTITIONER

## 2021-12-08 ASSESSMENT — SLEEP AND FATIGUE QUESTIONNAIRES
HOW LIKELY ARE YOU TO NOD OFF OR FALL ASLEEP WHILE WATCHING TV: 1
ESS TOTAL SCORE: 5
HOW LIKELY ARE YOU TO NOD OFF OR FALL ASLEEP WHILE LYING DOWN TO REST IN THE AFTERNOON WHEN CIRCUMSTANCES PERMIT: 2
HOW LIKELY ARE YOU TO NOD OFF OR FALL ASLEEP WHILE SITTING QUIETLY AFTER LUNCH WITHOUT ALCOHOL: 1
HOW LIKELY ARE YOU TO NOD OFF OR FALL ASLEEP IN A CAR, WHILE STOPPED FOR A FEW MINUTES IN TRAFFIC: 0
HOW LIKELY ARE YOU TO NOD OFF OR FALL ASLEEP WHILE SITTING INACTIVE IN A PUBLIC PLACE: 1
HOW LIKELY ARE YOU TO NOD OFF OR FALL ASLEEP WHILE SITTING AND READING: 0
HOW LIKELY ARE YOU TO NOD OFF OR FALL ASLEEP WHEN YOU ARE A PASSENGER IN A CAR FOR AN HOUR WITHOUT A BREAK: 0
HOW LIKELY ARE YOU TO NOD OFF OR FALL ASLEEP WHILE SITTING AND TALKING TO SOMEONE: 0

## 2021-12-08 NOTE — LETTER
[facility]  [place] [address] [place] [address]  Phone: [phone]  Fax: [phone]    December 8, 2021       Patient: Refugio Mayer   MR Number: [id number]   YOB: 1970   Date of Visit: 12/8/2021       Danielle Walker was seen for a follow up visit today. Here is my assessment and plan as well as an attached copy of her visit today:    GERD (gastroesophageal reflux disease)--off ppi currently--s/p egd 6/16--wnl  Chronic- Stable. Discussed the importance of treating obstructive sleep apnea as part of the management of this disorder. Cont any meds per PCP and other physicians. Non morbid obesity, unspecified obesity type  Chronic-not stable:  Discussed importance of treating obstructive sleep apnea and getting sufficient sleep to assist with weight control. Encouraged her to work on weight loss through diet and exercise. Recommended DASH or Mediterranean diets. Anxiety--on ssri and seroquel with help--does not see psychiatrist currently  Chronic- Stable. Discussed the importance of treating obstructive sleep apnea as part of the management of this disorder. Cont any meds per PCP and other physicians. Obstructive apnea  Chronic-Improving: Reviewed and analyzed results of physiologic download from patient's machine and reviewed with patient. Supplies and parts as needed for her machine. These are medically necessary. Limit caffeine use after 3pm. Based on the analyzed data will continue with current settings. Stable on her machine at current settings, getting benefit from the use, and having minimal side effects. Could consider decreasing pressure to help with mask leak but patient likes the flow of the pressure and would like to replace supplies first. Verbal and written instruction on PAP equipment cleaning and disinfection schedule provided. Follow up in 6 months or sooner if issues arise.          If you have questions or concerns, please do not hesitate to call me. I look forward to following Ny along with you.     Sincerely,    AMARILIS Clifford    CC providers:  Igor Gibbs MD  [facility] [address] [facility]  [place] [place]

## 2021-12-08 NOTE — PROGRESS NOTES
Lobo Dumont MD, Natalee Butler, [facility]  Tiffanie Ke[facility] [place] [address]  [address] [facility], [address] [facility]- [phone]   [facility] [address] [address]. [address] [facility]. [address]amin[address] [phone]     [address] [facility] [address] [phone]      Assessment/Plan:      1. Obstructive apnea  Assessment & Plan:  Chronic-Improving: Reviewed and analyzed results of physiologic download from patient's machine and reviewed with patient. Supplies and parts as needed for her machine. These are medically necessary. Limit caffeine use after 3pm. Based on the analyzed data will continue with current settings. Stable on her machine at current settings, getting benefit from the use, and having minimal side effects. Could consider decreasing pressure to help with mask leak but patient likes the flow of the pressure and would like to replace supplies first. Verbal and written instruction on PAP equipment cleaning and disinfection schedule provided. Follow up in 6 months or sooner if issues arise. 2. Gastroesophageal reflux disease, unspecified whether esophagitis present  Assessment & Plan:  Chronic- Stable. Discussed the importance of treating obstructive sleep apnea as part of the management of this disorder. Cont any meds per PCP and other physicians. 3. Non morbid obesity, unspecified obesity type  Assessment & Plan:  Chronic-not stable:  Discussed importance of treating obstructive sleep apnea and getting sufficient sleep to assist with weight control. Encouraged her to work on weight loss through diet and exercise. Recommended DASH or Mediterranean diets. 4. Anxiety--on ssri and seroquel with help--does not see psychiatrist currently  Assessment & Plan:  Chronic- Stable. Discussed the importance of treating obstructive sleep apnea as part of the management of this disorder.   Cont any meds per PCP and other physicians. Reviewed, analyzed, and documented physiologic data from patient's PAP machine. This information was analyzed to assess complexity and medical decision making in regards to further testing and management. The primary encounter diagnosis was Obstructive apnea. Diagnoses of Gastroesophageal reflux disease, unspecified whether esophagitis present, Non morbid obesity, unspecified obesity type, and Anxiety--on ssri and seroquel with help--does not see psychiatrist currently were also pertinent to this visit. The chronic medical conditions listed are directly related to the primary diagnosis listed above. The management of the primary diagnosis affects the secondary diagnosis and vice versa. Subjective:   Subjective   Patient ID: Keren Serrato is a 49 y.o. female. Chief Complaint   Patient presents with    Sleep Apnea       HPI:  Machine Modem/Download Info:  Compliance (hours/night): 6.12 hrs/night  % of nights >= 4 hrs: 92 %  Download AHI (/hour): 0.5 /HR  Average CPAP Pressure : 12.4 cmH2O      APAP - Settings  Pressure Min: 12 cmH2O  Pressure Max: 20 cmH2O                 Comfort Settings  Heated Tubing (Yes/No): Yes  Flex/EPR (0-3): 3 PAP Mask  Clinically Relevant Leak: Yes     Ny Alvarez reports she is doing well with her machine. Pressure feels good since it was increased the last visit. New machine is doing better. she is waking rested for the most part. she denies headaches, congestion, nosebleeds, dryness, aerophagia, or drowsiness while driving. Some mask leak but thinks she needs new supplies. Has also not been connecting her tubing so tight so that she can disconnect easier to clean.        [address]    [place] - Total score: 5    Social History     Socioeconomic History    Marital status: Single     Spouse name: Not on file    Number of children: Not on file    Years of education: Not on file    Highest education level: Not on file   Occupational History    Occupation: Housekeeping      Employer: [employer]: on FMLA   Tobacco Use    Smoking status: Former Smoker     Years: 1.00     Types: Cigarettes     Quit date: 1987     Years since quittin.1    Smokeless tobacco: Never Used    Tobacco comment: counseled on tobacco exposure avoidance   Vaping Use    Vaping Use: Never used   Substance and Sexual Activity    Alcohol use: No     Alcohol/week: 0.0 standard drinks    Drug use: No    Sexual activity: Never   Other Topics Concern    Not on file   Social History Narrative    19: Patient currently using two [employer] food panteries and [employer] pantry as needed for food shortages. She has also used financial assistance resources at her [employer], she is not eligable again for 6 months. [facility] will send via e-mail per patients request list of other local organizations that may offer food and financial assistance. Patient has also reached out to life matters and she attended one counseling session but has not rescheduled. ACM encouraged patient to continue counseling services with [employer]; ACM sent [facility] Brochure and contact number; encouraged her to reach out for other resources as well. Social Determinants of Health     Financial Resource Strain: Low Risk     Difficulty of Paying Living Expenses: Not hard at all   Food Insecurity: No Food Insecurity    Worried About Running Out of Food in the Last Year: Never true    Klever of Food in the Last Year: Never true   Transportation Needs:     Lack of Transportation (Medical): Not on file    Lack of Transportation (Non-Medical):  Not on file   Physical Activity:     Days of Exercise per Week: Not on file    Minutes of Exercise per Session: Not on file   Stress:     Feeling of Stress : Not on file   Social Connections:     Frequency of Communication with Friends and Family: Not on file    Frequency of Social Gatherings with Friends and Family: Not on file    Attends Worship Services: Not on file    Active Member of Clubs or Organizations: Not on file    Attends Club or Organization Meetings: Not on file    Marital Status: Not on file   Intimate Partner Violence:     Fear of Current or Ex-Partner: Not on file    Emotionally Abused: Not on file    Physically Abused: Not on file    Sexually Abused: Not on file   Housing Stability:     Unable to Pay for Housing in the Last Year: Not on file    Number of [place] in the Last Year: Not on file    Unstable Housing in the Last Year: Not on file       Current Outpatient Medications   Medication Instructions    Multiple Vitamins-Minerals (MULTIVITAMIN ADULT PO) 1 tablet, Oral, DAILY          Vitals:  Weight BMI   Wt Readings from Last 3 Encounters:   12/08/21 233 lb 12.8 oz (106.1 kg)   10/27/21 229 lb (103.9 kg)   09/22/21 229 lb (103.9 kg)    Body mass index is 36.62 kg/m².      BP HR SaO2   BP Readings from Last 3 Encounters:   12/08/21 124/80   10/27/21 100/67   09/22/21 (!) 115/54    Pulse Readings from Last 3 Encounters:   12/08/21 81   10/27/21 78   09/22/21 75    SpO2 Readings from Last 3 Encounters:   12/08/21 99%   08/18/21 96%   07/19/21 95%        Electronically signed by AMARILIS Miller on 12/8/2021 at 3:11 PM

## 2021-12-08 NOTE — ASSESSMENT & PLAN NOTE
Chronic-Improving: Reviewed and analyzed results of physiologic download from patient's machine and reviewed with patient. Supplies and parts as needed for her machine. These are medically necessary. Limit caffeine use after 3pm. Based on the analyzed data will continue with current settings. Stable on her machine at current settings, getting benefit from the use, and having minimal side effects. Could consider decreasing pressure to help with mask leak but patient likes the flow of the pressure and would like to replace supplies first. Verbal and written instruction on PAP equipment cleaning and disinfection schedule provided. Follow up in 6 months or sooner if issues arise.

## 2021-12-27 ENCOUNTER — TELEPHONE (OUTPATIENT)
Dept: ORTHOPEDIC SURGERY | Age: 51
End: 2021-12-27

## 2021-12-27 NOTE — TELEPHONE ENCOUNTER
General Question     Subject: Patient requesting her appointment be a VV. States that she needs the visit but has COVID. Please advise the patient.   Patient Sarai Gruber  Contact Number: [phone]

## 2021-12-28 ENCOUNTER — VIRTUAL VISIT (OUTPATIENT)
Dept: ORTHOPEDIC SURGERY | Age: 51
End: 2021-12-28
Payer: MEDICAID

## 2021-12-28 ENCOUNTER — PATIENT MESSAGE (OUTPATIENT)
Dept: ORTHOPEDIC SURGERY | Age: 51
End: 2021-12-28

## 2021-12-28 DIAGNOSIS — M54.16 LUMBAR RADICULOPATHY: ICD-10-CM

## 2021-12-28 DIAGNOSIS — M48.061 FORAMINAL STENOSIS OF LUMBAR REGION: ICD-10-CM

## 2021-12-28 DIAGNOSIS — M51.27 HERNIATED NUCLEUS PULPOSUS, L5-S1: ICD-10-CM

## 2021-12-28 DIAGNOSIS — M51.36 DISC DEGENERATION, LUMBAR: ICD-10-CM

## 2021-12-28 PROCEDURE — 99024 POSTOP FOLLOW-UP VISIT: CPT | Performed by: PHYSICIAN ASSISTANT

## 2021-12-28 PROCEDURE — 99442 PR PHYS/QHP TELEPHONE EVALUATION 11-20 MIN: CPT | Performed by: PHYSICIAN ASSISTANT

## 2021-12-29 ENCOUNTER — TELEPHONE (OUTPATIENT)
Dept: ORTHOPEDIC SURGERY | Age: 51
End: 2021-12-29

## 2021-12-29 NOTE — TELEPHONE ENCOUNTER
General Question     Subject: Patient call today and stated she need a Letter or Note stated she need restrictions for work and the dates change for when she can return to work she stated she need that letter as soon as possible so her job can have it before the first of the year she stated she put a message on her my chart and I also put in and addendum for that as well if someone can call her about this matter.    [address] [place] [address], Francisco Javier Baires  Contact Number: [phone]

## 2021-12-29 NOTE — TELEPHONE ENCOUNTER
Continue her current work restrictions for the next 8 weeks     The same restrictions that niay had put her on last time

## 2021-12-29 NOTE — TELEPHONE ENCOUNTER
I called and let the patient know that they are in surgery today and I am waiting for Aruna Marinelli to answer me.

## 2021-12-29 NOTE — TELEPHONE ENCOUNTER
Patient call today and I advised the patient on what was in the notes and she stated she need the notes to stated about the restrictions.

## 2022-02-15 ENCOUNTER — HOSPITAL ENCOUNTER (OUTPATIENT)
Dept: WOMENS IMAGING | Age: 52
Discharge: HOME | End: 2022-02-15
Payer: COMMERCIAL

## 2022-02-15 DIAGNOSIS — Z12.31 BREAST CANCER SCREENING BY MAMMOGRAM: ICD-10-CM

## 2022-02-15 PROCEDURE — 77067 SCR MAMMO BI INCL CAD: CPT

## 2022-02-22 ENCOUNTER — OFFICE VISIT (OUTPATIENT)
Dept: ORTHOPEDIC SURGERY | Age: 52
End: 2022-02-22
Payer: MEDICAID

## 2022-02-22 VITALS — HEIGHT: 67 IN | BODY MASS INDEX: 36.57 KG/M2 | WEIGHT: 233 LBS

## 2022-02-22 DIAGNOSIS — M54.50 LOW BACK PAIN, UNSPECIFIED BACK PAIN LATERALITY, UNSPECIFIED CHRONICITY, UNSPECIFIED WHETHER SCIATICA PRESENT: ICD-10-CM

## 2022-02-22 DIAGNOSIS — L03.116 CELLULITIS OF LEFT LOWER EXTREMITY: ICD-10-CM

## 2022-02-22 DIAGNOSIS — M51.36 DISC DEGENERATION, LUMBAR: ICD-10-CM

## 2022-02-22 DIAGNOSIS — M54.50 LOW BACK PAIN, UNSPECIFIED BACK PAIN LATERALITY, UNSPECIFIED CHRONICITY, UNSPECIFIED WHETHER SCIATICA PRESENT: Primary | ICD-10-CM

## 2022-02-22 DIAGNOSIS — M54.16 LUMBAR RADICULOPATHY: ICD-10-CM

## 2022-02-22 LAB
BASOPHILS ABSOLUTE: 0 K/UL (ref 0–0.2)
BASOPHILS RELATIVE PERCENT: 0.5 %
C-REACTIVE PROTEIN: 4.3 MG/L (ref 0–5.1)
EOSINOPHILS ABSOLUTE: 0.2 K/UL (ref 0–0.6)
EOSINOPHILS RELATIVE PERCENT: 3.2 %
HCT VFR BLD CALC: 38.5 % (ref 36–48)
HEMOGLOBIN: 12.7 G/DL (ref 12–16)
LYMPHOCYTES ABSOLUTE: 1.9 K/UL (ref 1–5.1)
LYMPHOCYTES RELATIVE PERCENT: 34.2 %
MCH RBC QN AUTO: 28.5 PG (ref 26–34)
MCHC RBC AUTO-ENTMCNC: 33 G/DL (ref 31–36)
MCV RBC AUTO: 86.3 FL (ref 80–100)
MONOCYTES ABSOLUTE: 0.6 K/UL (ref 0–1.3)
MONOCYTES RELATIVE PERCENT: 10.5 %
NEUTROPHILS ABSOLUTE: 2.9 K/UL (ref 1.7–7.7)
NEUTROPHILS RELATIVE PERCENT: 51.6 %
PDW BLD-RTO: 15.4 % (ref 12.4–15.4)
PLATELET # BLD: 173 K/UL (ref 135–450)
PMV BLD AUTO: 10.4 FL (ref 5–10.5)
RBC # BLD: 4.46 M/UL (ref 4–5.2)
SEDIMENTATION RATE, ERYTHROCYTE: 56 MM/HR (ref 0–30)
WBC # BLD: 5.5 K/UL (ref 4–11)

## 2022-02-22 PROCEDURE — 1036F TOBACCO NON-USER: CPT | Performed by: ORTHOPAEDIC SURGERY

## 2022-02-22 PROCEDURE — G8417 CALC BMI ABV UP PARAM F/U: HCPCS | Performed by: ORTHOPAEDIC SURGERY

## 2022-02-22 PROCEDURE — 99214 OFFICE O/P EST MOD 30 MIN: CPT | Performed by: ORTHOPAEDIC SURGERY

## 2022-02-22 PROCEDURE — G8484 FLU IMMUNIZE NO ADMIN: HCPCS | Performed by: ORTHOPAEDIC SURGERY

## 2022-02-22 PROCEDURE — 3017F COLORECTAL CA SCREEN DOC REV: CPT | Performed by: ORTHOPAEDIC SURGERY

## 2022-02-22 PROCEDURE — G8427 DOCREV CUR MEDS BY ELIG CLIN: HCPCS | Performed by: ORTHOPAEDIC SURGERY

## 2022-02-22 NOTE — PROGRESS NOTES
Date:  2022    Name:  Art Greenberg  Address:  [address] [place] [address]    :  1970      Age:   49 y.o.    SSN:  [id number]      Medical Record Number:  [id number]    Reason for Visit:    Chief Complaint    Back Pain (L-SPINE)      DOS:      Currently. Pain from cellulitis with continued treatments over the past 3-4 weeks. Increase in bone pain on the left side recently down the tibia with additional rash in the area    She has been having some low back pain which has improved to some degree coinciding with some loss of weight    At one point she was put on a higher duty functional basis at work but is requesting to be back to sedentary duty again with maximum limits of 5 pounds of lifting            She is attempting to walk and regain some of her control of the abck and reduction of her weight . Using brace. Difficulty with work activites but she has continued work detail    HPI: Maureen Valle is a 49 y.o. female here today for continued complaints of pain in the right side. Tight restrictions at work. Has missed three weeks of work release. 4 hours of work nothing over 5 pounds. Pain post injection is greater than what she has prior . Pain is related to bilateral legs. Give way of the bilateral legs. More limited activity has increased her weight by 10 + pounds over the past month     Pain in the low back. Pinching sensations of the bilateral legs posteriorly. Review of Systems:  Review of Systems   Constitutional: Negative for chills and fever. HENT: Negative for nosebleeds. Eyes: Negative for double vision. Cardiovascular: Negative for chest pain. Gastrointestinal: Negative for abdominal pain. Musculoskeletal: Positive for joint pain and myalgias. Skin: Negative for rash. Neurological: Negative for seizures. Psychiatric/Behavioral: Negative for hallucinations.         Past History:  Past Medical History:   Diagnosis Date    Anxiety     Chronic back pain     Headache(784.0)     Hx of blood clots     2016-was on xarelto for 6months    Obstructive apnea     Seizures ([facility] [address]     states no longer  has them-last one was in -     Past Surgical History:   Procedure Laterality Date    EPIDURAL STEROID INJECTION N/A 9/10/2019    MIDLINE LUMBAR FIVE SACRAL ONE EPIDURAL STEROID INJECTION SITE CONFIRMED BY FLUOROSCOPY performed by Taj Siu MD at [address] Right     RIGHT KNEE    KNEE SURGERY Left 2012    LASIK Bilateral 2004    PAIN MANAGEMENT PROCEDURE Bilateral 2021    BILATERAL L3,L4 MEDIAL BRANCH BLOCKS WITH FLUOROSCOPY performed by Kash Mooney MD at [address] [facility]  11.4.16    Dr. Macy Meigs       Current Outpatient Medications on File Prior to Visit   Medication Sig Dispense Refill    Multiple Vitamins-Minerals (MULTIVITAMIN ADULT PO) Take 1 tablet by mouth daily       No current facility-administered medications on file prior to visit. Social History     Socioeconomic History    Marital status: Single     Spouse name: Not on file    Number of children: Not on file    Years of education: Not on file    Highest education level: Not on file   Occupational History    Occupation: Housekeeping      Employer: [employer]: on [employer]   Tobacco Use    Smoking status: Former Smoker     Years: 1.00     Types: Cigarettes     Quit date: 1987     Years since quittin.3    Smokeless tobacco: Never Used    Tobacco comment: counseled on tobacco exposure avoidance   Vaping Use    Vaping Use: Never used   Substance and Sexual Activity    Alcohol use: No     Alcohol/week: 0.0 standard drinks    Drug use: No    Sexual activity: Never   Other Topics Concern    Not on file   Social History Narrative    19: Patient currently using two [employer] food panteries and [employer] pantry as needed for food shortages.  She has also used financial assistance resources at her [employer], she is not eligable again for 6 months. [facility] will send via e-mail per patients request list of other local organizations that may offer food and financial assistance. Patient has also reached out to life matters and she attended one counseling session but has not rescheduled. ACM encouraged patient to continue counseling services with life matters; ACM sent [employer] Brochure and contact number; encouraged her to reach out for other resources as well. Social Determinants of Health     Financial Resource Strain: Low Risk     Difficulty of Paying Living Expenses: Not hard at all   Food Insecurity: No Food Insecurity    Worried About Running Out of Food in the Last Year: Never true    Klever of Food in the Last Year: Never true   Transportation Needs:     Lack of Transportation (Medical): Not on file    Lack of Transportation (Non-Medical):  Not on file   Physical Activity:     Days of Exercise per Week: Not on file    Minutes of Exercise per Session: Not on file   Stress:     Feeling of Stress : Not on file   Social Connections:     Frequency of Communication with Friends and Family: Not on file    Frequency of Social Gatherings with Friends and Family: Not on file    Attends Mandaen Services: Not on file    Active Member of [address] [place] [address] or Organizations: Not on file    Attends Club or Organization Meetings: Not on file    Marital Status: Not on file   Intimate Partner Violence:     Fear of Current or Ex-Partner: Not on file    Emotionally Abused: Not on file    Physically Abused: Not on file    Sexually Abused: Not on file   Housing Stability:     Unable to Pay for Housing in the Last Year: Not on file    Number of [place] in the Last Year: Not on file    Unstable Housing in the Last Year: Not on file     Family History   Problem Relation Age of Onset    Heart Disease Father     Other Father         LILIAN    Diabetes Father A-okay and cross index and middle fingers. Full range of motion of the hand wrist elbow and shoulder. RLE:moving hip and knee well. Limited back motions with less hamstring irritation on the right than the left. Knee stable. Back facet maneuvers  Irritable. No gross deformities noted. Sensation is intact to light touch throughout the lower extremity. Able to wiggle toes and plantar and dorsiflex foot. Full range of motion at the ankle, knee and hip  Streak rash in the right leg with some erythema below it indicating some contact dermatitis from stockings  LLE:    Left hipwith some limited internal rotation and no major pain in the left groin. No gross deformities noted. Sensation is intact to light touch throughout the lower extremity. Able to wiggle toes and plantar and dorsiflex foot. Full range of motion at the ankle, knee and hip  Abrasion in the dorsal aspect of the calf with some residual erythema but no evidence of daiana cellulitis. There is also similar topographical rash in the proximal aspect of the calf    Walking with difficulty wide based short stride length gait. Some pain with palpation over the sciatic nerve. Diagnostics:  Xray   Have reviewed the xrays above from 02/22/22   and my impression is:  1. Lumbar radiculopathy       2. Foraminal stenosis of lumbar region       3. Low back pain, unspecified back pain laterality, unspecified chronicity, unspecified whether sciatica present         Assessment: additioial compalnts of back pain which appears to have increased since last injection. She has returned with limited work duty which has been difficult for her. She wants to [place] current restrictions and continue handicapped parking. Plan: neurontin and smuscle relaxats for her neurogenic pain. Potentially she should be able to manage progression of activity as her pain decresaes.    Work towards additional therapy and weight loss when her mobility and pain improves. No additional ijnections at this point. With regard to the stockings in the lower extremity we have discontinued them for the potential that these are hypersensitivity reactions she did have Covid at the end of last year and this may have had something to do with increased irritability of her skin.   There is no signs of any formal cellulitis at this point      [unfilled]     Clarice Hoffmann MD    Date:    2/22/2022

## 2022-03-04 ENCOUNTER — HOSPITAL ENCOUNTER (OUTPATIENT)
Dept: PHYSICAL THERAPY | Age: 52
Setting detail: THERAPIES SERIES
Discharge: HOME | End: 2022-03-04
Payer: COMMERCIAL

## 2022-03-04 PROCEDURE — 97161 PT EVAL LOW COMPLEX 20 MIN: CPT | Performed by: PHYSICAL THERAPIST

## 2022-03-04 PROCEDURE — 97110 THERAPEUTIC EXERCISES: CPT | Performed by: PHYSICAL THERAPIST

## 2022-03-04 PROCEDURE — 97140 MANUAL THERAPY 1/> REGIONS: CPT | Performed by: PHYSICAL THERAPIST

## 2022-03-06 NOTE — PLAN OF CARE
[facility] and [address],  [facility], [facility] [address]  [address] [facility],5Th Floor   Richa Juárez  Phone: [phone]  Fax: [phone]                                                         Physical Therapy Certification    Dear  Referring Practitioner: dr Shakira Riley,,    We had the pleasure of evaluating the following patient for physical therapy services at [address] [place] [address]. A summary of our findings can be found in the initial assessment below. This includes our plan of care. If you have any questions or concerns regarding these findings, please do not hesitate to contact me at the office phone number checked above. Thank you for the referral.       Physician Signature:_______________________________Date:__________________  By signing above (or electronic signature), therapists plan is approved by physician      Patient: Bethel Mosqueda   : 1970   MRN: [id number]  Referring Physician: Referring Practitioner: dr Shakira Riley      Evaluation Date: 3/6/2022      Medical Diagnosis Information:  Diagnosis: lbp - m54.5                                             Insurance information:       Precautions/ Contra-indications:   Latex Allergy:  [x]NO      []YES  Preferred Language for Healthcare:   [x]English       []Other:  C-SSRS Triggered by Intake questionnaire (Past 2 wk assessment):   [x] No, Questionnaire did not trigger screening.   [] Yes, Patient intake triggered further evaluation      [] C-SSRS Screening completed  [] PCP notified via Plan of Care  [] Emergency services notified      SUBJECTIVE:  Reports lbp that is relentless and progressive.       Relevant Medical History:  Hx of blood clots  2016-was on xarelto for 6months         Other Medical History    Diagnosis Date Comment Source   Anxiety      Chronic back pain      Headache(784.0)      Obstructive apnea      Seizures ([facility] [address]  states no longer  has them-last one                Functional Disability Index:  62% mod oswestry       Pain Scale: 7/10      Easing factors:   Rest     Provocative factors:      Night Pain:    - complained of night pain associated with sleep position. Type:   - Constant         Paresthesia complaints:   - no paresthesia complaints       Functional Limitations/Impairments:   - Standing   - Walking      - Squatting/bending    - Stairs             - ADL's   - Sports/Recreation    Occupation/School:   - retired    - domestic    - labor intensive occupation   - sedentary occupation   - high school student    - college student   - unemployed   - on disability   - other    Sport/recreational activities:   - resistive training   - cardiovascular training        Living Status/Prior Level of Function: This patient was independent in ADL's and IADL's prior to onset of symptoms. PACEMAKER:  - Denied having a pacemaker that would contraindicate the use of electrical modalities. METAL IMPLANTS:  - Denied metal implants that would contraindicate the use of thermal modalities. CANCER HISTORY:  - Denied a history of cancer that would contraindicate thermal modalities. OBJECTIVE:   ROM: decreased lumbar 50%       Strength/Neuromuscular control:  3-/5 hip abd strength B     Dermatomal Sensation:  - Dermatomal sensation was intact to light touch bilaterally throughout upper and lower extremities. Deep tendon reflexes:  - DTR's were symmetrical and intact throughout. Joint mobility: moderately decreased lumbar     Flexibility:  Moderately decreased     Palpation: + moderate tenderness     Functional Mobility/Transfers:     Posture: + lumbar lordosis     Bandages/Dressings/Incisions:     Gait: increased B 'shuffling'     Special Tests/Functional tests:      Orthopedic Special Tests:                        [x] Patient history, allergies, meds reviewed. Medical chart reviewed. See intake form.      Review Of Systems (ROS):  [x]Performed Review of systems (Integumentary, CardioPulmonary, Neurological) by intake and observation. Intake form has been scanned into medical record. Patient has been instructed to contact their primary care physician regarding ROS issues if not already being addressed at this time. Cognitive, Communication, Behavior and Learning:  - The patient was alert, spoke coherently and was oriented to person, place and time. - Demonstrated no barriers to communication or processing information.  - Appeared literate, spoke Copious and had no significant hearing or vision impairment. - Had no abnormal behavioral responses, learning preferences or educational needs. Cardiopulmonary:  Edema:     Integumentary:  Nail beds:  Skin appearance:    Co-morbidities/Complexities (which may affect course of rehabilitation):   []Morbid obesity (E66.01)   []Uncontrolled HTN (I10) []DM type 1(E10.65) or 2 (E11.65) []RA(M05.9)/OA(M19.91)  []None  []other:    MEDICARE CAP EXCEPTION DOCUMENTATION  I certify that this patient meets one of the below criteria necessary for becoming an exception to the Medicare cap on therapy services:  []  The patient has a condition that has a direct and significant impact on the need for therapy. (Significantly impacts the rate of recovery)  [x]  The patient has a complexity that has a direct and significant impact on the need for therapy. (Significantly impacts the rate of recovery and is associated with a primary condition.)       []  The patient has associated variables that influence the amount of treatment to include:  Social support, self-efficacy/motivation, prognosis, time since onset/acuity. []  The patient has generalized musculoskeletal conditions or a condition affecting multiple sites that will have a direct impact on the rate of recovery. []  The patient had a prior episode of outpatient therapy during this calendar year for a different condition.         []  The patient has a mental or cognitive disorder in addition to the condition being treated that will have a direct and significant impact on the rate of recovery. ASSESSMENT:    Functional Impairments:     [x]Noted lumbar/proximal hip hypomobility   [x]Noted lumbosacral and/or generalized hypermobility   [x]Decreased Lumbosacral/hip/LE functional ROM   [x]Decreased core/proximal hip strength and neuromuscular control    [x]Decreased LE functional strength    [x]Abnormal reflexes/sensation/myotomal/dermatomal deficits  [x]Reduced balance/proprioceptive control    []other:      Functional Activity Limitations (from functional questionnaire and intake)   [x]Reduced ability to tolerate prolonged functional positions   [x]Reduced ability or difficulty with changes of positions or transfers between positions   [x]Reduced ability to maintain good posture and demonstrate good body mechanics with sitting, bending, and lifting   [x]Reduced ability to sleep   [x] Reduced ability or tolerance with driving and/or computer work   [x]Reduced ability to perform lifting, reaching, carrying tasks   [x]Reduced ability to squat   [x]Reduced ability to forward bend   [x]Reduced ability to ambulate prolonged functional periods/distances/surfaces   [x]Reduced ability to ascend/descend stairs    Participation Restrictions   [x]Reduced participation in self care activities   [x]Reduced participation in home management activities   []Reduced participation in work activities   [x]Reduced participation in social activities. [x]Reduced participation in sport activities. The patient demonstrated at least % but less than % impairment, limitation or restriction in:      - changing and maintaining body position      Classification :  -Impaired joint mobility, motor function, muscle performance and range of motion associated with a spinal disorder.  - Signs/symptoms consistent with Lumbar instability/stabilization subgroup.      - Signs/symptoms consistent with Lumbar mobilization/manipulation subgroup, myotomes and dermatomes intact. Meets manipulation criteria. - Signs/symptoms consistent with Lumbar direction specific/centralization subgroup  - Signs/symptoms consistent with Lumbar traction subgroup    - Signs/symptoms consistent with nerve root involvement including myotome & dermatome dysfunction    Prognosis/Rehab Potential:     - Fair      Factors affecting rehab potential:  - associated co-morbidities    Tolerance of evaluation/treatment:     - Good       Physical Therapy Evaluation Complexity Justification  [x] A history of present problem with:  [] no personal factors and/or comorbidities that impact the plan of care;  []1-2 personal factors and/or comorbidities that impact the plan of care  []3 personal factors and/or comorbidities that impact the plan of care  [x] An examination of body systems using standardized tests and measures addressing any of the following: body structures and functions (impairments), activity limitations, and/or participation restrictions;:  [x] a total of 1-2 or more elements   [] a total of 3 or more elements   [] a total of 4 or more elements   [x] A clinical presentation with:  [] stable and/or uncomplicated characteristics   [x] evolving clinical presentation with changing characteristics  [] unstable and unpredictable characteristics;   [x] Clinical decision making of [x] low, [] moderate, [] high complexity using standardized patient assessment instrument and/or measurable assessment of functional outcome.     [x] EVAL (LOW) 09900 (typically 30 minutes face-to-face)  [] EVAL (MOD) 28309 (typically 30 minutes face-to-face)  [] EVAL (HIGH) 36208 (typically 45 minutes face-to-face)  [] RE-EVAL         Co-morbidities/Complexities (which will affect course of rehabilitation):   []None           Arthritic conditions   []Rheumatoid arthritis (M05.9)  [x]Osteoarthritis (M19.91)   Cardiovascular conditions proper functional mobility as indicated by patients Functional Deficits. - Patient will return to previous functional activities without increased symptoms or restriction.     -patient will demonstrate wnl lumbar rom  -minimal lumbar point tenderness     Electronically signed by:  Donaldo Harvey PT, MPT

## 2022-03-06 NOTE — FLOWSHEET NOTE
The [address] and [address],  [facility], [facility] [address]  [address] [facility],5Th Floor   Richa Juárez  Phone: [phone]  Fax: [phone]         Physical Therapy Treatment Note/ Progress Report:           Date:  3/4/2022  Patient Name:  Ignatius Crigler    :  1970  MRN: [id number]  Restrictions/Precautions:    Medical/Treatment Diagnosis Information:  · Diagnosis: lbp - m54.5  ·    Insurance/Certification information:     Physician Information:  Referring Practitioner: dr Baudilio Correa  Has the plan of care been signed (Y/N):        []  Yes  [x]  No     Date of Patient follow up with Physician:       Is this a Progress Report:     []  Yes  [x]  No        If Yes:  Date Range for reporting period:  Beginning  Ending    Progress report will be due (10 Rx or 30 days whichever is less):        Recertification will be due (POC Duration  / 90 days whichever is less):         Visit # Insurance Allowable Auth Required   1  []  Yes []  No        Functional Scale:    Date assessed:       Latex Allergy:  [x]NO      []YES  Preferred Language for Healthcare:   [x]English       []other:      Pain level:  7/10     SUBJECTIVE:  See eval    OBJECTIVE: See eval   Observation:    Test measurements:      RESTRICTIONS/PRECAUTIONS:     Exercises/Interventions:       Therapeutic Ex (46861) Sets/sec Reps Notes/CUES   ktc   10 x 10\"    Piriformis   4 x 20'    Hamstring   4 x 20\"    Child pose   10\" x 4     Bridges   20x     Clamshells   25x          Bike   8'                             Manual Intervention (88152)      Prom/stm   16'                                                                                                                                             Therapeutic Exercise and NMR EXR  [x] (81507) Provided verbal/tactile cueing for activities related to strengthening, flexibility, endurance, ROM for improvements in LE, proximal hip, and core control with self care, mobility, lifting, ambulation. [x] (14379) Provided verbal/tactile cueing for activities related to improving balance, coordination, kinesthetic sense, posture, motor skill, proprioception to assist with LE, proximal hip, and core control in self-care, mobility, lifting, ambulation and eccentric single leg control. NMR and Therapeutic Activities:    [x] (61499 or 51915) Provided verbal/tactile cueing for activities related to improving balance, coordination, kinesthetic sense, posture, motor skill, proprioception and motor activation to allow for proper function of core, proximal hip and LE with self-care and ADLs and functional mobility.   [] (19722) Gait Re-education- Provided training and instruction to the patient for proper LE, core and proximal hip recruitment and positioning and eccentric body weight control with ambulation re-education including up and down stairs     Home Exercise Program:    [x] (17394) Reviewed/Progressed HEP activities related to strengthening, flexibility, endurance, ROM of core, proximal hip and LE for functional self-care, mobility, lifting and ambulation/stair navigation   [] (73381) Reviewed/Progressed HEP activities related to improving balance, coordination, kinesthetic sense, posture, motor skill, proprioception of core, proximal hip and LE for self-care, mobility, lifting, and ambulation/stair navigation      Manual Treatments:  PROM / STM / Oscillations-Mobs:  G-I, II, III, IV (PA's, Inf., Post.)  [x] (85940) Provided manual therapy to mobilize LE, proximal hip and/or LS spine soft tissue/joints for the purpose of modulating pain, promoting relaxation, increasing ROM, reducing/eliminating soft tissue swelling/inflammation/restriction, improving soft tissue extensibility and allowing for proper ROM for normal function with self-care, mobility, lifting and ambulation.      Modalities:     [] GAME READY (VASO)- for significant edema, swelling, pain Progression has been slowed due to co-morbidities. [x] Plan just implemented, too soon to assess goals progression <30days   [] Goals require adjustment due to lack of progress  [] Patient is not progressing as expected and requires additional follow up with physician  [] Other    Prognosis for POC: [x] Good [] Fair  [] Poor      Patient requires continued skilled intervention: [x] Yes  [] No    Treatment/Activity Tolerance:  [x] Patient able to complete treatment  [] Patient limited by fatigue  [] Patient limited by pain    [] Patient limited by other medical complications  [] Other:         Return to Play: (if applicable)   []  Stage 1: Intro to Strength   []  Stage 2: Return to Run and Strength   []  Stage 3: Return to Jump and Strength   []  Stage 4: Dynamic Strength and Agility   []  Stage 5: Sport Specific Training     []  Ready to Return to Play, Meets All Above Stages   []  Not Ready for Return to Sports   Comments:                               PLAN: See eval  [] Continue per plan of care [] Alter current plan (see comments above)  [x] Plan of care initiated [] Hold pending MD visit [] Discharge      Electronically signed by:  Julius Baker PT,MPT     Note: If patient does not return for scheduled/ recommended follow up visits, this note will serve as a discharge from care along with most recent update on progress.

## 2022-03-08 ENCOUNTER — HOSPITAL ENCOUNTER (OUTPATIENT)
Dept: PHYSICAL THERAPY | Age: 52
Setting detail: THERAPIES SERIES
Discharge: HOME | End: 2022-03-08
Payer: COMMERCIAL

## 2022-03-08 PROCEDURE — 97110 THERAPEUTIC EXERCISES: CPT | Performed by: PHYSICAL THERAPIST

## 2022-03-08 PROCEDURE — 97140 MANUAL THERAPY 1/> REGIONS: CPT | Performed by: PHYSICAL THERAPIST

## 2022-03-10 ENCOUNTER — HOSPITAL ENCOUNTER (OUTPATIENT)
Dept: PHYSICAL THERAPY | Age: 52
Setting detail: THERAPIES SERIES
Discharge: HOME | End: 2022-03-10
Payer: COMMERCIAL

## 2022-03-10 PROCEDURE — 97110 THERAPEUTIC EXERCISES: CPT | Performed by: PHYSICAL THERAPIST

## 2022-03-10 PROCEDURE — 97140 MANUAL THERAPY 1/> REGIONS: CPT | Performed by: PHYSICAL THERAPIST

## 2022-03-12 NOTE — FLOWSHEET NOTE
The [address] [place] and [address],  Sports Performance and Rehabilitation, [facility] [address]  793 [facility],5Th [address]   Richa Juárez  Phone: [phone]  Fax: [phone]         Physical Therapy Treatment Note/ Progress Report:           Date:  3/8/2022  Patient Name:  Nicci Olmedo    :  1970  MRN: [id number]  Restrictions/Precautions:    Medical/Treatment Diagnosis Information:   lbp - m54.5      Insurance/Certification information:     Physician Information:     Has the plan of care been signed (Y/N):        []  Yes  [x]  No     Date of Patient follow up with Physician:       Is this a Progress Report:     []  Yes  [x]  No        If Yes:  Date Range for reporting period:  Beginning  Ending    Progress report will be due (10 Rx or 30 days whichever is less):        Recertification will be due (POC Duration  / 90 days whichever is less):         OBJECTIVE:     Observation:  Moderate lumbar tenderness (global)         Test measurements:      RESTRICTIONS/PRECAUTIONS:     Exercises/Interventions:       Therapeutic Ex (68393) Sets/sec Reps Notes/CUES   ktc   10 x 10\"    Piriformis   4 x 20'    Hamstring   4 x 20\"    Child pose   10\" x 4     Bridges   20x     Clamshells   25x          Bike   8'                             Manual Intervention (44091)      Prom/stm   16'                                                                                                                                             Therapeutic Exercise and NMR EXR  [x] (46948) Provided verbal/tactile cueing for activities related to strengthening, flexibility, endurance, ROM for improvements in LE, proximal hip, and core control with self care, mobility, lifting, ambulation.   [x] (06701) Provided verbal/tactile cueing for activities related to improving balance, coordination, kinesthetic sense, posture, motor skill, proprioception to assist with LE, proximal hip, and core control in self-care, mobility, lifting, ambulation and eccentric single leg control. NMR and Therapeutic Activities:    [x] (17689 or 69934) Provided verbal/tactile cueing for activities related to improving balance, coordination, kinesthetic sense, posture, motor skill, proprioception and motor activation to allow for proper function of core, proximal hip and LE with self-care and ADLs and functional mobility.   [] (26060) Gait Re-education- Provided training and instruction to the patient for proper LE, core and proximal hip recruitment and positioning and eccentric body weight control with ambulation re-education including up and down stairs     Home Exercise Program:    [x] (63630) Reviewed/Progressed HEP activities related to strengthening, flexibility, endurance, ROM of core, proximal hip and LE for functional self-care, mobility, lifting and ambulation/stair navigation   [] (75872) Reviewed/Progressed HEP activities related to improving balance, coordination, kinesthetic sense, posture, motor skill, proprioception of core, proximal hip and LE for self-care, mobility, lifting, and ambulation/stair navigation      Manual Treatments:  PROM / STM / Oscillations-Mobs:  G-I, II, III, IV (PA's, Inf., Post.)  [x] (45120) Provided manual therapy to mobilize LE, proximal hip and/or LS spine soft tissue/joints for the purpose of modulating pain, promoting relaxation, increasing ROM, reducing/eliminating soft tissue swelling/inflammation/restriction, improving soft tissue extensibility and allowing for proper ROM for normal function with self-care, mobility, lifting and ambulation. Modalities:     [] GAME READY (VASO)- for significant edema, swelling, pain control.      Charges:  Timed Code Treatment Minutes: 40'    Total Treatment Minutes: 36'       [] EVAL (LOW) 88067 (typically 20 minutes face-to-face)  [] EVAL (MOD) 32278 (typically 30 minutes face-to-face)  [] EVAL (HIGH) 38561 (typically 45 minutes face-to-face)  [] RE-EVAL     [x] CE(10364) x   1  [] IONTO  [] NMR (85932) x     [] VASO  [x] Manual (33847) x  2   [] Other:  [] TA x      [] Mech Traction (41520)  [] ES(attended) (74460)      [] ES (un) (22038):         ASSESSMENT:  See eval      GOALS:   Patient stated goal:     [] Progressing: [] Met: [] Not Met: [] Adjusted    Therapist goals for Patient:   Short Term Goals: To be achieved in: 2 weeks  1. Independent in HEP and progression per patient tolerance, in order to prevent re-injury. [x] Progressing: [] Met: [] Not Met: [] Adjusted   2. Patient will have a decrease in pain to facilitate improvement in movement, function, and ADLs as indicated by Functional Deficits. [x] Progressing: [] Met: [] Not Met: [] Adjusted    Long Term Goals: To be achieved in:  8 weeks  - The patient is expected to demonstrate less than % impairment, limitation or restriction in:     - changing and maintaining body position     - Patient will demonstrate an increase in Strength to 4/5 hips to allow for proper functional mobility as indicated by patients Functional Deficits. [x] Progressing: [] Met: [] Not Met: [] Adjusted  - Patient will return to previous functional activities without increased symptoms or restriction. [x] Progressing: [] Met: [] Not Met: [] Adjusted  -patient will demonstrate wnl lumbar rom  [x] Progressing: [] Met: [] Not Met: [] Adjusted  -minimal lumbar point tenderness   [x] Progressing: [] Met: [] Not Met: [] Adjusted        Overall Progression Towards Functional goals/ Treatment Progress Update:  [] Patient is progressing as expected towards functional goals listed. [] Progression is slowed due to complexities/Impairments listed. [] Progression has been slowed due to co-morbidities.   [x] Plan just implemented, too soon to assess goals progression <30days   [] Goals require adjustment due to lack of progress  [] Patient is not progressing as expected and requires additional follow up with physician  [] Other    Prognosis for POC: [x] Good [] Fair  [] Poor      Patient requires continued skilled intervention: [x] Yes  [] No    Treatment/Activity Tolerance:  [x] Patient able to complete treatment  [] Patient limited by fatigue  [] Patient limited by pain    [] Patient limited by other medical complications  [] Other:         Return to Play: (if applicable)   []  Stage 1: Intro to Strength   []  Stage 2: Return to Run and Strength   []  Stage 3: Return to Jump and Strength   []  Stage 4: Dynamic Strength and Agility   []  Stage 5: Sport Specific Training     []  Ready to Return to Play, Meets All Above Stages   []  Not Ready for Return to Sports   Comments:                               PLAN:   [x] Continue per plan of care [] Alter current plan (see comments above)  [] Plan of care initiated [] Hold pending MD visit [] Discharge      Electronically signed by:  Elke Ponce PT,MPT     Note: If patient does not return for scheduled/ recommended follow up visits, this note will serve as a discharge from care along with most recent update on progress.

## 2022-03-12 NOTE — FLOWSHEET NOTE
The [facility] and [address],  Sports Performance and Rehabilitation, [facility] [address]  [address] [facility],5[address]   Richa Juárez  Phone: [phone]  Fax: [phone]         Physical Therapy Treatment Note/ Progress Report:           Date:  3/10/2022  Patient Name:  Ugo Alvarez    :  1970  MRN: [id number]  Restrictions/Precautions:    Medical/Treatment Diagnosis Information:   lbp - m54.5      Insurance/Certification information:     Physician Information:     Has the plan of care been signed (Y/N):        []  Yes  [x]  No     Date of Patient follow up with Physician:       Is this a Progress Report:     []  Yes  [x]  No        If Yes:  Date Range for reporting period:  Beginning  Ending    Progress report will be due (10 Rx or 30 days whichever is less):        Recertification will be due (POC Duration  / 90 days whichever is less):         OBJECTIVE:     Observation:  Moderate lumbar tenderness (global)         Test measurements:      RESTRICTIONS/PRECAUTIONS:     Exercises/Interventions:       Therapeutic Ex (36591) Sets/sec Reps Notes/CUES   ktc   10 x 10\"    Piriformis   4 x 20'    Hamstring   4 x 20\"    Child pose   10\" x 4     Bridges   20x     Clamshells   25x          Bike   8'                             Manual Intervention (18053)      Prom/stm   16'                                                                                                                                             Therapeutic Exercise and NMR EXR  [x] (81175) Provided verbal/tactile cueing for activities related to strengthening, flexibility, endurance, ROM for improvements in LE, proximal hip, and core control with self care, mobility, lifting, ambulation.   [x] (21786) Provided verbal/tactile cueing for activities related to improving balance, coordination, kinesthetic sense, posture, motor skill, proprioception to assist with LE, proximal hip, and core control in self-care, mobility, lifting, ambulation and eccentric single leg control. NMR and Therapeutic Activities:    [x] (36521 or 01280) Provided verbal/tactile cueing for activities related to improving balance, coordination, kinesthetic sense, posture, motor skill, proprioception and motor activation to allow for proper function of core, proximal hip and LE with self-care and ADLs and functional mobility.   [] (27727) Gait Re-education- Provided training and instruction to the patient for proper LE, core and proximal hip recruitment and positioning and eccentric body weight control with ambulation re-education including up and down stairs     Home Exercise Program:    [x] (21697) Reviewed/Progressed HEP activities related to strengthening, flexibility, endurance, ROM of core, proximal hip and LE for functional self-care, mobility, lifting and ambulation/stair navigation   [] (38416) Reviewed/Progressed HEP activities related to improving balance, coordination, kinesthetic sense, posture, motor skill, proprioception of core, proximal hip and LE for self-care, mobility, lifting, and ambulation/stair navigation      Manual Treatments:  PROM / STM / Oscillations-Mobs:  G-I, II, III, IV (PA's, Inf., Post.)  [x] (50945) Provided manual therapy to mobilize LE, proximal hip and/or LS spine soft tissue/joints for the purpose of modulating pain, promoting relaxation, increasing ROM, reducing/eliminating soft tissue swelling/inflammation/restriction, improving soft tissue extensibility and allowing for proper ROM for normal function with self-care, mobility, lifting and ambulation. Modalities:     [] GAME READY (VASO)- for significant edema, swelling, pain control.      Charges:  Timed Code Treatment Minutes: 35'    Total Treatment Minutes: 28'       [] EVAL (LOW) 85372 (typically 20 minutes face-to-face)  [] EVAL (MOD) 59856 (typically 30 minutes face-to-face)  [] EVAL (HIGH) 76532 (typically 45 minutes face-to-face)  [] RE-EVAL     [x] SA(62630) x   1  [] IONTO  [] NMR (80238) x     [] VASO  [x] Manual (12192) x  1   [] Other:  [] TA x      [] Mech Traction (40482)  [] ES(attended) (79973)      [] ES (un) (62370):         ASSESSMENT:  Tolerated well. ... GOALS:   Patient stated goal:     [] Progressing: [] Met: [] Not Met: [] Adjusted    Therapist goals for Patient:   Short Term Goals: To be achieved in: 2 weeks  1. Independent in HEP and progression per patient tolerance, in order to prevent re-injury. [x] Progressing: [] Met: [] Not Met: [] Adjusted   2. Patient will have a decrease in pain to facilitate improvement in movement, function, and ADLs as indicated by Functional Deficits. [x] Progressing: [] Met: [] Not Met: [] Adjusted    Long Term Goals: To be achieved in:  8 weeks  - The patient is expected to demonstrate less than % impairment, limitation or restriction in:     - changing and maintaining body position     - Patient will demonstrate an increase in Strength to 4/5 hips to allow for proper functional mobility as indicated by patients Functional Deficits. [x] Progressing: [] Met: [] Not Met: [] Adjusted  - Patient will return to previous functional activities without increased symptoms or restriction. [x] Progressing: [] Met: [] Not Met: [] Adjusted  -patient will demonstrate wnl lumbar rom  [x] Progressing: [] Met: [] Not Met: [] Adjusted  -minimal lumbar point tenderness   [x] Progressing: [] Met: [] Not Met: [] Adjusted        Overall Progression Towards Functional goals/ Treatment Progress Update:  [] Patient is progressing as expected towards functional goals listed. [] Progression is slowed due to complexities/Impairments listed. [] Progression has been slowed due to co-morbidities.   [x] Plan just implemented, too soon to assess goals progression <30days   [] Goals require adjustment due to lack of progress  [] Patient is not progressing as expected and requires additional follow up with physician  [] Other    Prognosis for POC: [x] Good [] Fair  [] Poor      Patient requires continued skilled intervention: [x] Yes  [] No    Treatment/Activity Tolerance:  [x] Patient able to complete treatment  [] Patient limited by fatigue  [] Patient limited by pain    [] Patient limited by other medical complications  [] Other:         Return to Play: (if applicable)   []  Stage 1: Intro to Strength   []  Stage 2: Return to Run and Strength   []  Stage 3: Return to Jump and Strength   []  Stage 4: Dynamic Strength and Agility   []  Stage 5: Sport Specific Training     []  Ready to Return to Play, Meets All Above Stages   []  Not Ready for Return to Sports   Comments:                               PLAN:   [x] Continue per plan of care [] Alter current plan (see comments above)  [] Plan of care initiated [] Hold pending MD visit [] Discharge      Electronically signed by:  Alexandro Cárdenas PT,MPT     Note: If patient does not return for scheduled/ recommended follow up visits, this note will serve as a discharge from care along with most recent update on progress.

## 2022-03-16 ENCOUNTER — HOSPITAL ENCOUNTER (OUTPATIENT)
Dept: PHYSICAL THERAPY | Age: 52
Setting detail: THERAPIES SERIES
Discharge: HOME | End: 2022-03-16
Payer: COMMERCIAL

## 2022-03-16 PROCEDURE — 97140 MANUAL THERAPY 1/> REGIONS: CPT | Performed by: SPECIALIST/TECHNOLOGIST

## 2022-03-16 PROCEDURE — G0283 ELEC STIM OTHER THAN WOUND: HCPCS | Performed by: SPECIALIST/TECHNOLOGIST

## 2022-03-16 PROCEDURE — 97110 THERAPEUTIC EXERCISES: CPT | Performed by: SPECIALIST/TECHNOLOGIST

## 2022-03-18 ENCOUNTER — HOSPITAL ENCOUNTER (OUTPATIENT)
Dept: PHYSICAL THERAPY | Age: 52
Setting detail: THERAPIES SERIES
Discharge: HOME | End: 2022-03-18
Payer: COMMERCIAL

## 2022-03-18 PROCEDURE — G0283 ELEC STIM OTHER THAN WOUND: HCPCS | Performed by: SPECIALIST/TECHNOLOGIST

## 2022-03-18 PROCEDURE — 97140 MANUAL THERAPY 1/> REGIONS: CPT | Performed by: SPECIALIST/TECHNOLOGIST

## 2022-03-18 PROCEDURE — 97110 THERAPEUTIC EXERCISES: CPT | Performed by: SPECIALIST/TECHNOLOGIST

## 2022-03-18 NOTE — FLOWSHEET NOTE
The [facility] and [address],  Sports Performance and Rehabilitation, [facility] [address]  793 [facility],5[address]   Richa Juárez  Phone: [phone]  Fax: [phone]         Physical Therapy Treatment Note/ Progress Report:           Date:  3/18/2022  Patient Name:  Joseline Minor    :  1970  MRN: [id number]  Restrictions/Precautions:    Medical/Treatment Diagnosis Information:   lbp - m54.5      Insurance/Certification information:     Physician Information:     Has the plan of care been signed (Y/N):        []  Yes  [x]  No     Date of Patient follow up with Physician:       Is this a Progress Report:     []  Yes  [x]  No        If Yes:  Date Range for reporting period:  Beginning  Ending    Progress report will be due (10 Rx or 30 days whichever is less):        Recertification will be due (POC Duration  / 90 days whichever is less):     Subjective:  Pt. States\" My back is feeling a lot better since last PT session. \"    OBJECTIVE:     Observation:  Moderate lumbar tenderness (global)         Test measurements:      RESTRICTIONS/PRECAUTIONS:     Exercises/Interventions:       Therapeutic Ex (42758) Sets/sec Reps Notes/CUES   LTR  10 x 5\"    ktc   10 x 10\" NV   Piriformis   2 x 20' B    Hamstring - 90/90  2 x 20\" B    Child pose   10\" x 10     Bridges   20x     Clamshells - SL   25x B NV         Prone glut set  20 x 5\" NV   SLR ext - EOT             Bike   8'  NV                           Manual Intervention (23858)      Bilateral long axis distraction, Lumbar PA mobs, sacral distraction, STM bilateral piriformis/glut med   15'                                                                                                                                             Access Code: [email or web address]  URL: [email or web address]  Date: 2022  Prepared by: Mishel 64 - 1-2 x daily - 7 x weekly - 2 sets - 10 reps  Sherry Bacon (BKA) - 1-2 x daily - 7 x weekly - 1 sets - 10 reps - 5 hold  Supine Piriformis Stretch Pulling Heel to Hip - 1-2 x daily - 7 x weekly - 1 sets - 2 reps - 20 hold  Lower Trunk Rotations - 1-2 x daily - 7 x weekly - 1 sets - 10 reps - 5 hold    Therapeutic Exercise and NMR EXR  [x] (45167) Provided verbal/tactile cueing for activities related to strengthening, flexibility, endurance, ROM for improvements in LE, proximal hip, and core control with self care, mobility, lifting, ambulation. [x] (11539) Provided verbal/tactile cueing for activities related to improving balance, coordination, kinesthetic sense, posture, motor skill, proprioception to assist with LE, proximal hip, and core control in self-care, mobility, lifting, ambulation and eccentric single leg control.      NMR and Therapeutic Activities:    [x] (79511 or 04719) Provided verbal/tactile cueing for activities related to improving balance, coordination, kinesthetic sense, posture, motor skill, proprioception and motor activation to allow for proper function of core, proximal hip and LE with self-care and ADLs and functional mobility.   [] (98541) Gait Re-education- Provided training and instruction to the patient for proper LE, core and proximal hip recruitment and positioning and eccentric body weight control with ambulation re-education including up and down stairs     Home Exercise Program:    [x] (20083) Reviewed/Progressed HEP activities related to strengthening, flexibility, endurance, ROM of core, proximal hip and LE for functional self-care, mobility, lifting and ambulation/stair navigation   [] (03270) Reviewed/Progressed HEP activities related to improving balance, coordination, kinesthetic sense, posture, motor skill, proprioception of core, proximal hip and LE for self-care, mobility, lifting, and ambulation/stair navigation      Manual Treatments:  PROM / STM / Oscillations-Mobs:  G-I, II, III, IV (PA's, Inf., Post.)  [x] (70885) Provided manual therapy to mobilize LE, proximal hip and/or LS spine soft tissue/joints for the purpose of modulating pain, promoting relaxation, increasing ROM, reducing/eliminating soft tissue swelling/inflammation/restriction, improving soft tissue extensibility and allowing for proper ROM for normal function with self-care, mobility, lifting and ambulation. Modalities:  EGS + CP 15'   [] GAME READY (VASO)- for significant edema, swelling, pain control. Charges:  Timed Code Treatment Minutes: 30'    Total Treatment Minutes: 61'       [] EVAL (LOW) 60373 (typically 20 minutes face-to-face)  [] EVAL (MOD) 79211 (typically 30 minutes face-to-face)  [] EVAL (HIGH) 26866 (typically 45 minutes face-to-face)  [] RE-EVAL     [x] GQ(20168) x   1  [] IONTO  [] NMR (38393) x     [] VASO  [x] Manual (43357) x  1  [] Other:  [] TA x      [] Mech Traction (32837)  [] ES(attended) (59115)      [x] ES (un) (59587):         ASSESSMENT:   TTP over bilateral piriformis / paraspinals. TC /VC with HEP for proper techniques. Pt. Reports decrease LB tightness and increase mobility after tx. GOALS:   Patient stated goal:     [] Progressing: [] Met: [] Not Met: [] Adjusted    Therapist goals for Patient:   Short Term Goals: To be achieved in: 2 weeks  1. Independent in HEP and progression per patient tolerance, in order to prevent re-injury. [x] Progressing: [] Met: [] Not Met: [] Adjusted   2. Patient will have a decrease in pain to facilitate improvement in movement, function, and ADLs as indicated by Functional Deficits. [x] Progressing: [] Met: [] Not Met: [] Adjusted    Long Term Goals: To be achieved in:  8 weeks  - The patient is expected to demonstrate less than % impairment, limitation or restriction in:     - changing and maintaining body position     - Patient will demonstrate an increase in Strength to 4/5 hips to allow for proper functional mobility as indicated by patients Functional Deficits.    [x] Progressing: [] Met: [] Not Met: [] Adjusted  - Patient will return to previous functional activities without increased symptoms or restriction. [x] Progressing: [] Met: [] Not Met: [] Adjusted  -patient will demonstrate wnl lumbar rom  [x] Progressing: [] Met: [] Not Met: [] Adjusted  -minimal lumbar point tenderness   [x] Progressing: [] Met: [] Not Met: [] Adjusted        Overall Progression Towards Functional goals/ Treatment Progress Update:  [] Patient is progressing as expected towards functional goals listed. [] Progression is slowed due to complexities/Impairments listed. [] Progression has been slowed due to co-morbidities.   [x] Plan just implemented, too soon to assess goals progression <30days   [] Goals require adjustment due to lack of progress  [] Patient is not progressing as expected and requires additional follow up with physician  [] Other    Prognosis for POC: [x] Good [] Fair  [] Poor      Patient requires continued skilled intervention: [x] Yes  [] No    Treatment/Activity Tolerance:  [x] Patient able to complete treatment  [] Patient limited by fatigue  [] Patient limited by pain    [] Patient limited by other medical complications  [] Other:         Return to Play: (if applicable)   []  Stage 1: Intro to Strength   []  Stage 2: Return to Run and Strength   []  Stage 3: Return to Jump and Strength   []  Stage 4: Dynamic Strength and Agility   []  Stage 5: Sport Specific Training     []  Ready to Return to Play, Meets All Above Stages   []  Not Ready for Return to Sports   Comments:                               PLAN:   [x] Continue per plan of care [] Alter current plan (see comments above)  [] Plan of care initiated [] Hold pending MD visit [] Discharge      Electronically signed by:  Aquiles Ramesh, PTA  [id number], Markos Guerrero, PT,MPT     Note: If patient does not return for scheduled/ recommended follow up visits, this note will serve as a discharge from care along with most recent update on progress.

## 2022-03-21 ENCOUNTER — HOSPITAL ENCOUNTER (OUTPATIENT)
Dept: PHYSICAL THERAPY | Age: 52
Setting detail: THERAPIES SERIES
Discharge: HOME | End: 2022-03-21
Payer: COMMERCIAL

## 2022-03-21 PROCEDURE — 97140 MANUAL THERAPY 1/> REGIONS: CPT | Performed by: SPECIALIST/TECHNOLOGIST

## 2022-03-21 PROCEDURE — 97110 THERAPEUTIC EXERCISES: CPT | Performed by: SPECIALIST/TECHNOLOGIST

## 2022-03-21 NOTE — FLOWSHEET NOTE
The [facility] and [address],  Sports Performance and Rehabilitation, [facility] [address]  793 [facility],5[address]   Richa Juárez  Phone: [phone]  Fax: [phone]         Physical Therapy Treatment Note/ Progress Report:       Date:  3/21/2022  Patient Name:  Marybeth Gutierrez    :  1970  MRN: [id number]  Restrictions/Precautions:    Medical/Treatment Diagnosis Information:   lbp - m54.5      Insurance/Certification information:     Physician Information:     Has the plan of care been signed (Y/N):        []  Yes  [x]  No     Date of Patient follow up with Physician:       Is this a Progress Report:     []  Yes  [x]  No        If Yes:  Date Range for reporting period:  Beginning  Ending    Progress report will be due (10 Rx or 30 days whichever is less):        Recertification will be due (POC Duration  / 90 days whichever is less):     Visit # Insurance Allowable Auth Required   6 No auth - 30 visits  []? Yes     []? No                Subjective:   Pt feeling good today. States \" I am having a great day! \" Reports no pain today and has positive outlook on therapy.      OBJECTIVE:     Observation:  Moderate lumbar tenderness (global)         Test measurements:      RESTRICTIONS/PRECAUTIONS:     Exercises/Interventions:       Therapeutic Ex (17144) Sets/sec Reps Notes/CUES   LTR  10 x 5\"    ktc   10 x 10\"    Piriformis   2 x 20' B    Hamstring - 90/90  2 x 20\" B    Child pose   10\" x 10     Bridges   20x     Clamshells - SL   25x B    TA set  10x 5\"    Prone glut set  20 x 5\"    SLR ext - EOT    NV         Bike   8'  NV                           Manual Intervention (72790)      Bilateral long axis distraction, Lumbar PA mobs, sacral distraction, STM bilateral piriformis/glut med   15'                                                                                                                                             Access Code: [email or web address]  URL: mobility, lifting, and ambulation/stair navigation      Manual Treatments:  PROM / STM / Oscillations-Mobs:  G-I, II, III, IV (PA's, Inf., Post.)  [x] (21214) Provided manual therapy to mobilize LE, proximal hip and/or LS spine soft tissue/joints for the purpose of modulating pain, promoting relaxation, increasing ROM, reducing/eliminating soft tissue swelling/inflammation/restriction, improving soft tissue extensibility and allowing for proper ROM for normal function with self-care, mobility, lifting and ambulation. Modalities:  EGS + CP 15'   [] GAME READY (VASO)- for significant edema, swelling, pain control. Charges:  Timed Code Treatment Minutes: 40'    Total Treatment Minutes: 54'       [] EVAL (LOW) 455 1011 (typically 20 minutes face-to-face)  [] EVAL (MOD) 77745 (typically 30 minutes face-to-face)  [] EVAL (HIGH) 18621 (typically 45 minutes face-to-face)  [] RE-EVAL     [x] XL(42939) x   2  [] IONTO  [] NMR (67111) x     [] VASO  [x] Manual (20893) x  1  [] Other:  [] TA x      [] Mech Traction (19938)  [] ES(attended) (42529)      [x] ES (un) (97751):         ASSESSMENT:   Pt responding to tx well today. Increase in pain with ktc exercise, decreases with rest/time. Showing progression towards goals this date. Continues to require the use of skilled PT services to increase overall strength, stability, functional mobility and endurance for completion of functional activities. GOALS:   Patient stated goal:     [] Progressing: [] Met: [] Not Met: [] Adjusted    Therapist goals for Patient:   Short Term Goals: To be achieved in: 2 weeks  1. Independent in HEP and progression per patient tolerance, in order to prevent re-injury. [x] Progressing: [] Met: [] Not Met: [] Adjusted   2. Patient will have a decrease in pain to facilitate improvement in movement, function, and ADLs as indicated by Functional Deficits. [x] Progressing: [] Met: [] Not Met: [] Adjusted    Long Term Goals:  To be achieved in:  8 weeks  - The patient is expected to demonstrate less than % impairment, limitation or restriction in:     - changing and maintaining body position     - Patient will demonstrate an increase in Strength to 4/5 hips to allow for proper functional mobility as indicated by patients Functional Deficits. [x] Progressing: [] Met: [] Not Met: [] Adjusted  - Patient will return to previous functional activities without increased symptoms or restriction. [x] Progressing: [] Met: [] Not Met: [] Adjusted  -patient will demonstrate wnl lumbar rom  [x] Progressing: [] Met: [] Not Met: [] Adjusted  -minimal lumbar point tenderness   [x] Progressing: [] Met: [] Not Met: [] Adjusted        Overall Progression Towards Functional goals/ Treatment Progress Update:  [] Patient is progressing as expected towards functional goals listed. [] Progression is slowed due to complexities/Impairments listed. [] Progression has been slowed due to co-morbidities.   [x] Plan just implemented, too soon to assess goals progression <30days   [] Goals require adjustment due to lack of progress  [] Patient is not progressing as expected and requires additional follow up with physician  [] Other    Prognosis for POC: [x] Good [] Fair  [] Poor      Patient requires continued skilled intervention: [x] Yes  [] No    Treatment/Activity Tolerance:  [x] Patient able to complete treatment  [] Patient limited by fatigue  [] Patient limited by pain    [] Patient limited by other medical complications  [] Other:         Return to Play: (if applicable)   []  Stage 1: Intro to Strength   []  Stage 2: Return to Run and Strength   []  Stage 3: Return to Jump and Strength   []  Stage 4: Dynamic Strength and Agility   []  Stage 5: Sport Specific Training     []  Ready to Return to Play, Meets All Above Stages   []  Not Ready for Return to Sports   Comments:                               PLAN:   [x] Continue per plan of care [] Alter current plan (see comments above)  [] Plan of care initiated [] Hold pending MD visit [] Discharge      Electronically signed by: Kevin Kaiser, [place]  [id number], Dinah Thrasher, PT,MPT     Therapist was present, directed the patient's care, made skilled judgement, and was responsible for assessment and treatment of the patient. Note: If patient does not return for scheduled/ recommended follow up visits, this note will serve as a discharge from care along with most recent update on progress.

## 2022-03-23 ENCOUNTER — HOSPITAL ENCOUNTER (OUTPATIENT)
Dept: PHYSICAL THERAPY | Age: 52
Setting detail: THERAPIES SERIES
Discharge: HOME | End: 2022-03-23
Payer: COMMERCIAL

## 2022-03-23 PROCEDURE — G0283 ELEC STIM OTHER THAN WOUND: HCPCS | Performed by: SPECIALIST/TECHNOLOGIST

## 2022-03-23 PROCEDURE — 97140 MANUAL THERAPY 1/> REGIONS: CPT | Performed by: SPECIALIST/TECHNOLOGIST

## 2022-03-23 PROCEDURE — 97110 THERAPEUTIC EXERCISES: CPT | Performed by: SPECIALIST/TECHNOLOGIST

## 2022-03-23 NOTE — FLOWSHEET NOTE
The [facility] and [address],  Sports Performance and Rehabilitation, [facility] 3899 [address]  793 [facility],5Th Floor   Richa Juárez  Phone: [phone]  Fax: [phone]         Physical Therapy Treatment Note/ Progress Report:       Date:  3/23/2022  Patient Name:  Pam Leyva    :  1970  MRN: [id number]  Restrictions/Precautions:    Medical/Treatment Diagnosis Information:   lbp - m54.5      Insurance/Certification information:     Physician Information:     Has the plan of care been signed (Y/N):        []  Yes  [x]  No     Date of Patient follow up with Physician:       Is this a Progress Report:     []  Yes  [x]  No        If Yes:  Date Range for reporting period:  Beginning  Ending    Progress report will be due (10 Rx or 30 days whichever is less):        Recertification will be due (POC Duration  / 90 days whichever is less):     Visit # Insurance Allowable Auth Required   7 No auth - 30 visits  []? Yes     []? No                Subjective:   Pt reports feeling good after previous tx session. With minimal pain today.      OBJECTIVE:     Observation:  Moderate lumbar tenderness (global)         Test measurements:      RESTRICTIONS/PRECAUTIONS:     Exercises/Interventions:       Therapeutic Ex (70046) Sets/sec Reps Notes/CUES   LTR  10 x 5\" NV   ktc   10 x 10\" NV   Piriformis   2 x 20' B    Hamstring - 90/90  2 x 20\" B    Child pose   10\" x 10     Bridges   20x  NV   Clamshells - SL   25x B    TA set  10x 5\"    Prone glut set  20 x 5\"    SLR ext - EOT    NV   Bird dog - UE only  10x B                Bike   8'  NV                           Manual Intervention (95475)      Bilateral long axis distraction, Lumbar PA mobs, sacral distraction, STM bilateral piriformis/glut med   10'                                                                                                                                             Access Code: [email or web address]  URL: [email or web address]  Date: 03/16/2022  Prepared by: Mishel 64 - 1-2 x daily - 7 x weekly - 2 sets - 10 reps  Quadruped Rock Back (BKA) - 1-2 x daily - 7 x weekly - 1 sets - 10 reps - 5 hold  Supine Piriformis Stretch Pulling Heel to Hip - 1-2 x daily - 7 x weekly - 1 sets - 2 reps - 20 hold  Lower Trunk Rotations - 1-2 x daily - 7 x weekly - 1 sets - 10 reps - 5 hold    Therapeutic Exercise and NMR EXR  [x] (73237) Provided verbal/tactile cueing for activities related to strengthening, flexibility, endurance, ROM for improvements in LE, proximal hip, and core control with self care, mobility, lifting, ambulation. [x] (68685) Provided verbal/tactile cueing for activities related to improving balance, coordination, kinesthetic sense, posture, motor skill, proprioception to assist with LE, proximal hip, and core control in self-care, mobility, lifting, ambulation and eccentric single leg control.      NMR and Therapeutic Activities:    [x] (98576 or 98577) Provided verbal/tactile cueing for activities related to improving balance, coordination, kinesthetic sense, posture, motor skill, proprioception and motor activation to allow for proper function of core, proximal hip and LE with self-care and ADLs and functional mobility.   [] (77455) Gait Re-education- Provided training and instruction to the patient for proper LE, core and proximal hip recruitment and positioning and eccentric body weight control with ambulation re-education including up and down stairs     Home Exercise Program:    [] (50431) Reviewed/Progressed HEP activities related to strengthening, flexibility, endurance, ROM of core, proximal hip and LE for functional self-care, mobility, lifting and ambulation/stair navigation   [] (56247) Reviewed/Progressed HEP activities related to improving balance, coordination, kinesthetic sense, posture, motor skill, proprioception of core, proximal hip and LE for self-care, mobility, lifting, and ambulation/stair navigation      Manual Treatments:  PROM / STM / Oscillations-Mobs:  G-I, II, III, IV (PA's, Inf., Post.)  [x] (95572) Provided manual therapy to mobilize LE, proximal hip and/or LS spine soft tissue/joints for the purpose of modulating pain, promoting relaxation, increasing ROM, reducing/eliminating soft tissue swelling/inflammation/restriction, improving soft tissue extensibility and allowing for proper ROM for normal function with self-care, mobility, lifting and ambulation. Modalities:  EGS + CP 15'   [] GAME READY (VASO)- for significant edema, swelling, pain control. Charges:  Timed Code Treatment Minutes: 28'    Total Treatment Minutes: 48'     Pt arrived 10 minutes late. [] EVAL (LOW) 79651 (typically 20 minutes face-to-face)  [] EVAL (MOD) 96620 (typically 30 minutes face-to-face)  [] EVAL (HIGH) 20297 (typically 45 minutes face-to-face)  [] RE-EVAL     [x] YV(87290) x   1  [] IONTO  [] NMR (42955) x     [] VASO  [x] Manual (67774) x  1  [] Other:  [] TA x      [] Mech Traction (19965)  [] ES(attended) (31045)      [x] ES (un) (54177):         ASSESSMENT: Pt tolerating tx session well with no reports of pain increase with therapy progression. Requires verbal cues for proper therex positioning to maximize outcomes. Showing good progression towards goals this date. Continues to require skilled PT services to increase overall strength, stability, functional mobility and endurance for completion of functional activities. GOALS:   Patient stated goal:     [] Progressing: [] Met: [] Not Met: [] Adjusted    Therapist goals for Patient:   Short Term Goals: To be achieved in: 2 weeks  1. Independent in HEP and progression per patient tolerance, in order to prevent re-injury. [x] Progressing: [] Met: [] Not Met: [] Adjusted   2.  Patient will have a decrease in pain to facilitate improvement in movement, function, and ADLs as indicated by Functional Deficits. [x] Progressing: [] Met: [] Not Met: [] Adjusted    Long Term Goals: To be achieved in:  8 weeks  - The patient is expected to demonstrate less than % impairment, limitation or restriction in:     - changing and maintaining body position     - Patient will demonstrate an increase in Strength to 4/5 hips to allow for proper functional mobility as indicated by patients Functional Deficits. [x] Progressing: [] Met: [] Not Met: [] Adjusted  - Patient will return to previous functional activities without increased symptoms or restriction. [x] Progressing: [] Met: [] Not Met: [] Adjusted  -patient will demonstrate wnl lumbar rom  [x] Progressing: [] Met: [] Not Met: [] Adjusted  -minimal lumbar point tenderness   [x] Progressing: [] Met: [] Not Met: [] Adjusted        Overall Progression Towards Functional goals/ Treatment Progress Update:  [] Patient is progressing as expected towards functional goals listed. [] Progression is slowed due to complexities/Impairments listed. [] Progression has been slowed due to co-morbidities.   [x] Plan just implemented, too soon to assess goals progression <30days   [] Goals require adjustment due to lack of progress  [] Patient is not progressing as expected and requires additional follow up with physician  [] Other    Prognosis for POC: [x] Good [] Fair  [] Poor      Patient requires continued skilled intervention: [x] Yes  [] No    Treatment/Activity Tolerance:  [x] Patient able to complete treatment  [] Patient limited by fatigue  [] Patient limited by pain    [] Patient limited by other medical complications  [] Other:         Return to Play: (if applicable)   []  Stage 1: Intro to Strength   []  Stage 2: Return to Run and Strength   []  Stage 3: Return to Jump and Strength   []  Stage 4: Dynamic Strength and Agility   []  Stage 5: Sport Specific Training     []  Ready to Return to Play, Meets All Above Stages   []  Not Ready for Return to Sports   Comments: PLAN:   [x] Continue per plan of care [] Alter current plan (see comments above)  [] Plan of care initiated [] Hold pending MD visit [] Discharge      Electronically signed by: Jonathan Weber [place]  [id number], Isi Cruz PT,MPT     Therapist was present, directed the patient's care, made skilled judgement, and was responsible for assessment and treatment of the patient. Note: If patient does not return for scheduled/ recommended follow up visits, this note will serve as a discharge from care along with most recent update on progress.

## 2022-03-28 ENCOUNTER — HOSPITAL ENCOUNTER (OUTPATIENT)
Dept: PHYSICAL THERAPY | Age: 52
Setting detail: THERAPIES SERIES
Discharge: HOME | End: 2022-03-28
Payer: COMMERCIAL

## 2022-03-28 NOTE — FLOWSHEET NOTE
The [facility] and [address],  Sports Performance and Rehabilitation, [facility] [address]  793 [facility],5[address] Floor   Richa Juárez  Phone: [phone]  Fax: [phone]      Physical Therapy  Cancellation/No-show Note  Patient Name:  Ugo Alvarez  :  1970   Date:  3/28/2022  Cancelled visits to date: 1  No-shows to date: 0    For today's appointment patient:  [x]  Cancelled  []  Rescheduled appointment  []  No-show     Reason given by patient:  []  Patient ill  []  Conflicting appointment  []  No transportation    []  Conflict with work  []  No reason given  [x]  Other:     Comments: pt reports having to go to [facility], won't be able to make it today.       Electronically signed by: Diann Randolph, [address] [employer]

## 2022-03-30 ENCOUNTER — APPOINTMENT (OUTPATIENT)
Dept: PHYSICAL THERAPY | Age: 52
End: 2022-03-30
Payer: COMMERCIAL

## 2022-03-31 ENCOUNTER — HOSPITAL ENCOUNTER (OUTPATIENT)
Dept: PHYSICAL THERAPY | Age: 52
Setting detail: THERAPIES SERIES
Discharge: HOME | End: 2022-03-31
Payer: COMMERCIAL

## 2022-03-31 PROCEDURE — 97140 MANUAL THERAPY 1/> REGIONS: CPT | Performed by: SPECIALIST/TECHNOLOGIST

## 2022-03-31 PROCEDURE — 97110 THERAPEUTIC EXERCISES: CPT | Performed by: SPECIALIST/TECHNOLOGIST

## 2022-03-31 PROCEDURE — G0283 ELEC STIM OTHER THAN WOUND: HCPCS | Performed by: SPECIALIST/TECHNOLOGIST

## 2022-03-31 NOTE — FLOWSHEET NOTE
The [facility] and [address],  Sports Performance and Rehabilitation, [facility] [address]99 [address]  793 [facility],5[address] Floor   [place], [place] Water Radha  Phone: [phone]  Fax: [phone]         Physical Therapy Treatment Note/ Progress Report:       Date:  3/31/2022  Patient Name:  Alanna Mejia    :  1970  MRN: [id number]  Restrictions/Precautions:    Medical/Treatment Diagnosis Information:   lbp - m54.5      Insurance/Certification information:     Physician Information:     Has the plan of care been signed (Y/N):        []  Yes  [x]  No     Date of Patient follow up with Physician:       Is this a Progress Report:     []  Yes  [x]  No        If Yes:  Date Range for reporting period:  Beginning  Ending    Progress report will be due (10 Rx or 30 days whichever is less):        Recertification will be due (POC Duration  / 90 days whichever is less):     Visit # Insurance Allowable Auth Required   8 No auth - 30 visits  []? Yes     []? No            Pain /10    Subjective:   Pt reports having a lot of pain the past few days.      OBJECTIVE:     Observation:  Moderate lumbar tenderness (global)         Test measurements:      RESTRICTIONS/PRECAUTIONS:     Exercises/Interventions:       Therapeutic Ex (65857) Sets/sec Reps Notes/CUES   LTR  10 x 5\" NV   ktc   10 x 10\" NV   Piriformis   2 x 20' B NV   Hamstring - 90/90  2 x 20\" B NV   Child pose   10\" x 10     Bridges   20x  NV   Clamshells - SL   25x B    TA set  10x 5\" NV   Prone glut set  20 x 5\"    SLR ext - EOT    NV   Bird dog - UE only  10x B NV               Bike   8'  NV               Discussed sleeping positions to relieve pain   5'          Manual Intervention (02966)      Bilateral long axis distraction in prone, Lumbar PA mobs, sacral distraction, STM bilateral piriformis/glut med , lumbar MFR  25' Access Code: [email or web address]  URL: [email or web address]  Date: 03/16/2022  Prepared by: Mishel 64 - 1-2 x daily - 7 x weekly - 2 sets - 10 reps  Quadruped Rock Back (BKA) - 1-2 x daily - 7 x weekly - 1 sets - 10 reps - 5 hold  Supine Piriformis Stretch Pulling Heel to Hip - 1-2 x daily - 7 x weekly - 1 sets - 2 reps - 20 hold  Lower Trunk Rotations - 1-2 x daily - 7 x weekly - 1 sets - 10 reps - 5 hold    Therapeutic Exercise and NMR EXR  [x] (93258) Provided verbal/tactile cueing for activities related to strengthening, flexibility, endurance, ROM for improvements in LE, proximal hip, and core control with self care, mobility, lifting, ambulation.  [] (91363) Provided verbal/tactile cueing for activities related to improving balance, coordination, kinesthetic sense, posture, motor skill, proprioception to assist with LE, proximal hip, and core control in self-care, mobility, lifting, ambulation and eccentric single leg control.      NMR and Therapeutic Activities:    [] (79171 or 33306) Provided verbal/tactile cueing for activities related to improving balance, coordination, kinesthetic sense, posture, motor skill, proprioception and motor activation to allow for proper function of core, proximal hip and LE with self-care and ADLs and functional mobility.   [] (61129) Gait Re-education- Provided training and instruction to the patient for proper LE, core and proximal hip recruitment and positioning and eccentric body weight control with ambulation re-education including up and down stairs     Home Exercise Program:    [] (59601) Reviewed/Progressed HEP activities related to strengthening, flexibility, endurance, ROM of core, proximal hip and LE for functional self-care, mobility, lifting and ambulation/stair navigation   [] (09599) Reviewed/Progressed HEP activities related to improving balance, coordination, kinesthetic sense, posture, motor skill, proprioception of core, proximal hip and LE for self-care, mobility, lifting, and ambulation/stair navigation      Manual Treatments:  PROM / STM / Oscillations-Mobs:  G-I, II, III, IV (PA's, Inf., Post.)  [x] (03422) Provided manual therapy to mobilize LE, proximal hip and/or LS spine soft tissue/joints for the purpose of modulating pain, promoting relaxation, increasing ROM, reducing/eliminating soft tissue swelling/inflammation/restriction, improving soft tissue extensibility and allowing for proper ROM for normal function with self-care, mobility, lifting and ambulation. Modalities:  EGS + CP 15'   [] GAME READY (VASO)- for significant edema, swelling, pain control. Charges:  Timed Code Treatment Minutes:  45'   Total Treatment Minutes: 61'       [] EVAL (LOW) 455 1011 (typically 20 minutes face-to-face)  [] EVAL (MOD) 34901 (typically 30 minutes face-to-face)  [] EVAL (HIGH) 36335 (typically 45 minutes face-to-face)  [] RE-EVAL     [x] QR(08765) x   1  [] IONTO  [] NMR (72644) x     [] VASO  [x] Manual (29992) x  2 [] Other:  [] TA x      [] Mech Traction (68413)  [] ES(attended) (20477)      [x] ES (un) (94708):         ASSESSMENT: Pt with increased pain/discomfort this session requiring increased manual therapy time for symptom relief/management. Unable to tolerate supine activity this date. Continues to require skilled PT services to increase overall strength, stability, functional mobility and endurance for completion of functional activities.      GOALS:   Patient stated goal:     [] Progressing: [] Met: [] Not Met: [] Adjusted    Therapist goals for Patient:   Short Term Goals: To be achieved in: 2 weeks  1. Independent in HEP and progression per patient tolerance, in order to prevent re-injury. [x] Progressing: [] Met: [] Not Met: [] Adjusted   2. Patient will have a decrease in pain to facilitate improvement in movement, function, and ADLs as indicated by Functional Deficits.     [x] Progressing: [] Met: [] Not Met: [] Adjusted    Long Term Goals: To be achieved in:  8 weeks  - The patient is expected to demonstrate less than % impairment, limitation or restriction in:     - changing and maintaining body position     - Patient will demonstrate an increase in Strength to 4/5 hips to allow for proper functional mobility as indicated by patients Functional Deficits. [x] Progressing: [] Met: [] Not Met: [] Adjusted  - Patient will return to previous functional activities without increased symptoms or restriction. [x] Progressing: [] Met: [] Not Met: [] Adjusted  -patient will demonstrate wnl lumbar rom  [x] Progressing: [] Met: [] Not Met: [] Adjusted  -minimal lumbar point tenderness   [x] Progressing: [] Met: [] Not Met: [] Adjusted        Overall Progression Towards Functional goals/ Treatment Progress Update:  [] Patient is progressing as expected towards functional goals listed. [] Progression is slowed due to complexities/Impairments listed. [] Progression has been slowed due to co-morbidities.   [x] Plan just implemented, too soon to assess goals progression <30days   [] Goals require adjustment due to lack of progress  [] Patient is not progressing as expected and requires additional follow up with physician  [] Other    Prognosis for POC: [x] Good [] Fair  [] Poor      Patient requires continued skilled intervention: [x] Yes  [] No    Treatment/Activity Tolerance:  [] Patient able to complete treatment  [] Patient limited by fatigue  [x] Patient limited by pain    [] Patient limited by other medical complications  [] Other:         Return to Play: (if applicable)   []  Stage 1: Intro to Strength   []  Stage 2: Return to Run and Strength   []  Stage 3: Return to Jump and Strength   []  Stage 4: Dynamic Strength and Agility   []  Stage 5: Sport Specific Training     []  Ready to Return to Play, Meets All Above Stages   []  Not Ready for Return to Sports   Comments:                               PLAN:   [x] Continue per plan of care [] Alter current plan (see comments above)  [] Plan of care initiated [] Hold pending MD visit [] Discharge      Electronically signed by: Vincenzo Burch, [place]  [id number], Andressa Toure, PT,MPT     Therapist was present, directed the patient's care, made skilled judgement, and was responsible for assessment and treatment of the patient. Note: If patient does not return for scheduled/ recommended follow up visits, this note will serve as a discharge from care along with most recent update on progress.

## 2022-04-01 ENCOUNTER — APPOINTMENT (OUTPATIENT)
Dept: PHYSICAL THERAPY | Age: 52
End: 2022-04-01
Payer: COMMERCIAL

## 2022-04-04 ENCOUNTER — HOSPITAL ENCOUNTER (OUTPATIENT)
Dept: PHYSICAL THERAPY | Age: 52
Setting detail: THERAPIES SERIES
Discharge: HOME | End: 2022-04-04
Payer: COMMERCIAL

## 2022-04-04 PROCEDURE — 97110 THERAPEUTIC EXERCISES: CPT | Performed by: SPECIALIST/TECHNOLOGIST

## 2022-04-04 PROCEDURE — 97140 MANUAL THERAPY 1/> REGIONS: CPT | Performed by: SPECIALIST/TECHNOLOGIST

## 2022-04-04 PROCEDURE — G0283 ELEC STIM OTHER THAN WOUND: HCPCS | Performed by: SPECIALIST/TECHNOLOGIST

## 2022-04-04 NOTE — FLOWSHEET NOTE
[facility] and [address],  Sports Performance and Rehabilitation, [facility] [address] [address]  [address] [facility],5[address] Floor   Richa Juárez  Phone: [phone]  Fax: [phone]         Physical Therapy Treatment Note/ Progress Report:       Date:  2022  Patient Name:  Anthony Lancaster    :  1970  MRN: [id number]  Restrictions/Precautions:    Medical/Treatment Diagnosis Information:   lbp - m54.5      Insurance/Certification information:     Physician Information:     Has the plan of care been signed (Y/N):        []  Yes  [x]  No     Date of Patient follow up with Physician:       Is this a Progress Report:     []  Yes  [x]  No        If Yes:  Date Range for reporting period:  Beginning  Ending    Progress report will be due (10 Rx or 30 days whichever is less):        Recertification will be due (POC Duration  / 90 days whichever is less):     Visit # Insurance Allowable Auth Required   9 No auth - 30 visits  []? Yes     []? No            Pain 2/10    Subjective:  Pt reports she is having a lot of discomfort today.      OBJECTIVE:     Observation:  Moderate lumbar tenderness (global)         Test measurements:      RESTRICTIONS/PRECAUTIONS:     Exercises/Interventions:       Therapeutic Ex (25104) Sets/sec Reps Notes/CUES   LTR  10 x 5\" NV   ktc   10 x 10\" NV   Piriformis   2 x 20' B NV   Hamstring - 90/90  2 x 20\" B NV   Child pose   10\" x 10     Bridges   20x  NV   Clamshells - SL   25x B    TA set  10x 5\" NV   Prone glut set  20 x 5\"    SLR ext - EOT    NV   Bird dog - UE only  10x B NV               Bike   8'  NV               Discussed sleeping positions to relieve pain             Manual Intervention (26396)      Bilateral long axis distraction in prone, Lumbar PA mobs, sacral distraction, STM bilateral piriformis/glut med , lumbar MFR    Cupping static bilateral Lumbar  paraspinals   Cupping massage  17'           5'  3' Access Code: [email or web address]  URL: [email or web address]  Date: 03/16/2022  Prepared by: Mishel 64 - 1-2 x daily - 7 x weekly - 2 sets - 10 reps  Quadruped Rock Back (BKA) - 1-2 x daily - 7 x weekly - 1 sets - 10 reps - 5 hold  Supine Piriformis Stretch Pulling Heel to Hip - 1-2 x daily - 7 x weekly - 1 sets - 2 reps - 20 hold  Lower Trunk Rotations - 1-2 x daily - 7 x weekly - 1 sets - 10 reps - 5 hold    Therapeutic Exercise and NMR EXR  [x] (13507) Provided verbal/tactile cueing for activities related to strengthening, flexibility, endurance, ROM for improvements in LE, proximal hip, and core control with self care, mobility, lifting, ambulation.  [] (55597) Provided verbal/tactile cueing for activities related to improving balance, coordination, kinesthetic sense, posture, motor skill, proprioception to assist with LE, proximal hip, and core control in self-care, mobility, lifting, ambulation and eccentric single leg control.      NMR and Therapeutic Activities:    [] (72467 or 48373) Provided verbal/tactile cueing for activities related to improving balance, coordination, kinesthetic sense, posture, motor skill, proprioception and motor activation to allow for proper function of core, proximal hip and LE with self-care and ADLs and functional mobility.   [] (49744) Gait Re-education- Provided training and instruction to the patient for proper LE, core and proximal hip recruitment and positioning and eccentric body weight control with ambulation re-education including up and down stairs     Home Exercise Program:    [x] (44511) Reviewed/Progressed HEP activities related to strengthening, flexibility, endurance, ROM of core, proximal hip and LE for functional self-care, mobility, lifting and ambulation/stair navigation   [] (28477) Reviewed/Progressed HEP activities related to improving balance, coordination, kinesthetic sense, posture, motor skill, proprioception of core, proximal hip and LE for self-care, mobility, lifting, and ambulation/stair navigation      Manual Treatments:  PROM / STM / Oscillations-Mobs:  G-I, II, III, IV (PA's, Inf., Post.)  [x] (33245) Provided manual therapy to mobilize LE, proximal hip and/or LS spine soft tissue/joints for the purpose of modulating pain, promoting relaxation, increasing ROM, reducing/eliminating soft tissue swelling/inflammation/restriction, improving soft tissue extensibility and allowing for proper ROM for normal function with self-care, mobility, lifting and ambulation. Modalities:  EGS + CP 15'   [] GAME READY (VASO)- for significant edema, swelling, pain control. Charges:  Timed Code Treatment Minutes: 39'   Total Treatment Minutes: 61'       [] EVAL (LOW) 93334 (typically 20 minutes face-to-face)  [] EVAL (MOD) 85598 (typically 30 minutes face-to-face)  [] EVAL (HIGH) 81917 (typically 45 minutes face-to-face)  [] RE-EVAL      [x] STACIA(71672) x   1  [] IONTO  [] NMR (30688) x     [] VASO  [x] Manual (92943) x  2 [] Other:  [] TA x      [] Mech Traction (97393)  [] ES(attended) (15080)      [x] ES (un) (58215):         ASSESSMENT: Pt continues to have pain/discomfort requiring increased manual therapy time for symptom relief/management. Initiated cupping/ cupping massage for symptom relief. Continues to require skilled PT services to increase overall strength, stability, functional mobility and endurance for completion of functional activities.       GOALS:   Patient stated goal:     [] Progressing: [] Met: [] Not Met: [] Adjusted    Therapist goals for Patient:   Short Term Goals: To be achieved in: 2 weeks  1. Independent in HEP and progression per patient tolerance, in order to prevent re-injury. [x] Progressing: [] Met: [] Not Met: [] Adjusted   2.  Patient will have a decrease in pain to facilitate improvement in movement, function, and ADLs as indicated by Functional Deficits. [x] Progressing: [] Met: [] Not Met: [] Adjusted    Long Term Goals: To be achieved in:  8 weeks  - The patient is expected to demonstrate less than % impairment, limitation or restriction in:     - changing and maintaining body position     - Patient will demonstrate an increase in Strength to 4/5 hips to allow for proper functional mobility as indicated by patients Functional Deficits. [x] Progressing: [] Met: [] Not Met: [] Adjusted  - Patient will return to previous functional activities without increased symptoms or restriction. [x] Progressing: [] Met: [] Not Met: [] Adjusted  -patient will demonstrate wnl lumbar rom  [x] Progressing: [] Met: [] Not Met: [] Adjusted  -minimal lumbar point tenderness   [x] Progressing: [] Met: [] Not Met: [] Adjusted        Overall Progression Towards Functional goals/ Treatment Progress Update:  [] Patient is progressing as expected towards functional goals listed. [] Progression is slowed due to complexities/Impairments listed. [] Progression has been slowed due to co-morbidities.   [x] Plan just implemented, too soon to assess goals progression <30days   [] Goals require adjustment due to lack of progress  [] Patient is not progressing as expected and requires additional follow up with physician  [] Other    Prognosis for POC: [x] Good [] Fair  [] Poor      Patient requires continued skilled intervention: [x] Yes  [] No    Treatment/Activity Tolerance:  [] Patient able to complete treatment  [] Patient limited by fatigue  [x] Patient limited by pain    [] Patient limited by other medical complications  [] Other:         Return to Play: (if applicable)   []  Stage 1: Intro to Strength   []  Stage 2: Return to Run and Strength   []  Stage 3: Return to Jump and Strength   []  Stage 4: Dynamic Strength and Agility   []  Stage 5: Sport Specific Training     []  Ready to Return to Play, Meets All Above Stages   [] Not Ready for Return to Sports   Comments:                               PLAN:   [x] Continue per plan of care [] Alter current plan (see comments above)  [] Plan of care initiated [] Hold pending MD visit [] Discharge      Electronically signed by: Samra [place]  [id number], Alannah Quigley, PT,MPT     Therapist was present, directed the patient's care, made skilled judgement, and was responsible for assessment and treatment of the patient. Note: If patient does not return for scheduled/ recommended follow up visits, this note will serve as a discharge from care along with most recent update on progress.

## 2022-04-06 ENCOUNTER — HOSPITAL ENCOUNTER (OUTPATIENT)
Dept: PHYSICAL THERAPY | Age: 52
Setting detail: THERAPIES SERIES
Discharge: HOME | End: 2022-04-06
Payer: COMMERCIAL

## 2022-04-11 ENCOUNTER — HOSPITAL ENCOUNTER (OUTPATIENT)
Dept: PHYSICAL THERAPY | Age: 52
Setting detail: THERAPIES SERIES
Discharge: HOME | End: 2022-04-11
Payer: COMMERCIAL

## 2022-04-11 PROCEDURE — 97110 THERAPEUTIC EXERCISES: CPT | Performed by: SPECIALIST/TECHNOLOGIST

## 2022-04-11 PROCEDURE — 97140 MANUAL THERAPY 1/> REGIONS: CPT | Performed by: SPECIALIST/TECHNOLOGIST

## 2022-04-11 PROCEDURE — G0283 ELEC STIM OTHER THAN WOUND: HCPCS | Performed by: SPECIALIST/TECHNOLOGIST

## 2022-04-11 NOTE — PROGRESS NOTES
The [facility] and [address],  Sports Performance and Rehabilitation, [facility] [address]  [address] [facility],5[address]   [place], [address]  Phone: [phone]  Fax: [phone]         Physical Therapy Treatment Note/ Progress Report:       Date:  2022  Patient Name:  Shiv Owen    :  1970  MRN: [id number]  Restrictions/Precautions:    Medical/Treatment Diagnosis Information:   lbp - m54.5      Insurance/Certification information:     Physician Information:     Has the plan of care been signed (Y/N):        []  Yes  [x]  No     Date of Patient follow up with Physician:       Is this a Progress Report:     [x]  Yes  []  No        If Yes:  Date Range for reporting period:  Beginning  Ending    Progress report will be due (20 Rx or 30 days whichever is less):  3/12/90      Recertification will be due (POC Duration  / 90 days whichever is less):     MOD LILIBETH 30%   Date 22    Visit # Insurance Allowable Auth Required   10 No auth - 30 visits  []  Yes     []  No            Pain 0/10    Subjective:  Pt states she is doing good and has no pain today.      OBJECTIVE:    Observation:  Moderate lumbar tenderness (global)      Test measurements:  Taken on 22 ROM trunk flex: 90%, ext: 75%, side bend: 75% L & R        MMT R hip flex: 4+/5, R hip abd: 4/5, L hip flex 4/5, L hip abd: 4-/5    RESTRICTIONS/PRECAUTIONS:     Exercises/Interventions:       Therapeutic Ex (15899) Sets/sec Reps Notes/CUES   LTR  10 x 5\"    ktc   10 x 10\"    Piriformis   2 x 20' B    Hamstring - 90/90  2 x 20\" B NV   Child pose   10\" x 10     Bridges   20x     Clamshells - SL   25x B    TA set  10x 5\"    Prone glut set  20 x 5\"    SLR ext - EOT    NV   Bird dog - UE only  10x B                Bike   8'  NV               Desk ergonomics & set up   5' Discussed proper work station set up and provided pt with handout          Manual Intervention ([phone])      Bilateral long axis distraction in prone, Lumbar PA mobs, sacral distraction, STM bilateral piriformis/glut med , lumbar MFR     10'                                                                                                                                                       Access Code: [email or web address]  URL: [email or web address]  Date: 03/16/2022  Prepared by: Mishel 63 - 1-2 x daily - 7 x weekly - 2 sets - 10 reps  Quadruped Rock Back (BKA) - 1-2 x daily - 7 x weekly - 1 sets - 10 reps - 5 hold  Supine Piriformis Stretch Pulling Heel to Hip - 1-2 x daily - 7 x weekly - 1 sets - 2 reps - 20 hold  Lower Trunk Rotations - 1-2 x daily - 7 x weekly - 1 sets - 10 reps - 5 hold    Therapeutic Exercise and NMR EXR  [x] (26470) Provided verbal/tactile cueing for activities related to strengthening, flexibility, endurance, ROM for improvements in LE, proximal hip, and core control with self care, mobility, lifting, ambulation.  [] (96654) Provided verbal/tactile cueing for activities related to improving balance, coordination, kinesthetic sense, posture, motor skill, proprioception to assist with LE, proximal hip, and core control in self-care, mobility, lifting, ambulation and eccentric single leg control.      NMR and Therapeutic Activities:    [x] (72121 or 58243) Provided verbal/tactile cueing for activities related to improving balance, coordination, kinesthetic sense, posture, motor skill, proprioception and motor activation to allow for proper function of core, proximal hip and LE with self-care and ADLs and functional mobility.   [] (19885) Gait Re-education- Provided training and instruction to the patient for proper LE, core and proximal hip recruitment and positioning and eccentric body weight control with ambulation re-education including up and down stairs     Home Exercise Program:    [] (21571) Reviewed/Progressed HEP activities related to strengthening, flexibility, endurance, ROM of core, proximal hip and LE for functional self-care, mobility, lifting and ambulation/stair navigation   [] (54105) Reviewed/Progressed HEP activities related to improving balance, coordination, kinesthetic sense, posture, motor skill, proprioception of core, proximal hip and LE for self-care, mobility, lifting, and ambulation/stair navigation      Manual Treatments:  PROM / STM / Oscillations-Mobs:  G-I, II, III, IV (PA's, Inf., Post.)  [x] (36955) Provided manual therapy to mobilize LE, proximal hip and/or LS spine soft tissue/joints for the purpose of modulating pain, promoting relaxation, increasing ROM, reducing/eliminating soft tissue swelling/inflammation/restriction, improving soft tissue extensibility and allowing for proper ROM for normal function with self-care, mobility, lifting and ambulation. Modalities:  EGS + CP 15'   [] GAME READY (VASO)- for significant edema, swelling, pain control. Charges:  Timed Code Treatment Minutes: 39'   Total Treatment Minutes: 61'       [] EVAL (LOW) 72937 (typically 20 minutes face-to-face)  [] EVAL (MOD) 44503 (typically 30 minutes face-to-face)  [] EVAL (HIGH) 77356 (typically 45 minutes face-to-face)  [] RE-EVAL      [x] PV(16762) x   2  [] IONTO  [] NMR (87702) x     [] VASO  [x] Manual (04169)  x1      [] Other:  [] TA x      [] Mech Traction (93450)  [] ES(attended) (05906)      [x] ES (un) (11247):         ASSESSMENT: Pt tolerated tx session well, showing good progress from previous sessions. Able to re-introduce strengthening activities this visit d/t decrease in overall symptoms. Continues to require skilled PT services to increase ROM, strength, endurance, stability and functional mobility for completion of ADLS/IADLs. GOALS:   Patient stated goal:     [] Progressing: [] Met: [] Not Met: [] Adjusted    Therapist goals for Patient:   Short Term Goals: To be achieved in: 2 weeks  1. Independent in HEP and progression per patient tolerance, in order to prevent re-injury.      [x] Progressing: [] Met: [] Not Met: [] Adjusted   2. Patient will have a decrease in pain to facilitate improvement in movement, function, and ADLs as indicated by Functional Deficits. [x] Progressing: [] Met: [] Not Met: [] Adjusted    Long Term Goals: To be achieved in:  8 weeks  - The patient is expected to demonstrate less than % impairment, limitation or restriction in:     - changing and maintaining body position     - Patient will demonstrate an increase in Strength to 4/5 hips to allow for proper functional mobility as indicated by patients Functional Deficits. [x] Progressing: [] Met: [] Not Met: [] Adjusted  - Patient will return to previous functional activities without increased symptoms or restriction. [x] Progressing: [] Met: [] Not Met: [] Adjusted  -patient will demonstrate wnl lumbar rom  [x] Progressing: [] Met: [] Not Met: [] Adjusted  -minimal lumbar point tenderness   [x] Progressing: [] Met: [] Not Met: [] Adjusted        Overall Progression Towards Functional goals/ Treatment Progress Update:  [] Patient is progressing as expected towards functional goals listed. [] Progression is slowed due to complexities/Impairments listed. [] Progression has been slowed due to co-morbidities.   [x] Plan just implemented, too soon to assess goals progression <30days   [] Goals require adjustment due to lack of progress  [] Patient is not progressing as expected and requires additional follow up with physician  [] Other    Prognosis for POC: [x] Good [] Fair  [] Poor      Patient requires continued skilled intervention: [x] Yes  [] No    Treatment/Activity Tolerance:  [x] Patient able to complete treatment  [] Patient limited by fatigue  [] Patient limited by pain    [] Patient limited by other medical complications  [] Other:         Return to Play: (if applicable)   []  Stage 1: Intro to Strength   []  Stage 2: Return to Run and Strength   []  Stage 3: Return to Jump and Strength   []  Stage 4: Dynamic Strength and Agility   []  Stage 5: Sport Specific Training     []  Ready to Return to Play, Meets All Above Stages   []  Not Ready for Return to Sports   Comments:                               PLAN:   [x] Continue per plan of care [] Alter current plan (see comments above)  [] Plan of care initiated [] Hold pending MD visit [] Discharge      Electronically signed by: Prashanth Vela PT, OMT-C      Therapist was present, directed the patient's care, made skilled judgement, and was responsible for assessment and treatment of the patient. Note: If patient does not return for scheduled/ recommended follow up visits, this note will serve as a discharge from care along with most recent update on progress.

## 2022-04-13 ENCOUNTER — HOSPITAL ENCOUNTER (OUTPATIENT)
Dept: PHYSICAL THERAPY | Age: 52
Setting detail: THERAPIES SERIES
Discharge: HOME | End: 2022-04-13
Payer: COMMERCIAL

## 2022-04-13 PROCEDURE — G0283 ELEC STIM OTHER THAN WOUND: HCPCS | Performed by: PHYSICAL THERAPIST

## 2022-04-13 PROCEDURE — 97140 MANUAL THERAPY 1/> REGIONS: CPT | Performed by: PHYSICAL THERAPIST

## 2022-04-13 PROCEDURE — 97110 THERAPEUTIC EXERCISES: CPT | Performed by: PHYSICAL THERAPIST

## 2022-04-13 NOTE — FLOWSHEET NOTE
The [facility] and [address],  Sports Performance and Rehabilitation, [facility] [address]  [address] [facility],[address]   Richa Juárez  Phone: [phone]  Fax: [phone]         Physical Therapy Treatment Note/ Progress Report:       Date:  2022  Patient Name:  Ross Angulo    :  1970  MRN: [id number]  Restrictions/Precautions:    Medical/Treatment Diagnosis Information:    lbp - m54.5      Insurance/Certification information:     Physician Information:     Has the plan of care been signed (Y/N):        []  Yes  [x]  No     Date of Patient follow up with Physician:       Is this a Progress Report:     [x]  Yes  []  No        If Yes:  Date Range for reporting period:  Beginning  Ending    Progress report will be due (20 Rx or 30 days whichever is less):        Recertification will be due (POC Duration  / 90 days whichever is less):     MOD LILIBETH 30%   Date 22    Visit # Insurance Allowable Auth Required   11 No auth - 30 visits  []  Yes     []  No            Pain 0/10    Subjective:     Patient reports her back is feeling fine today. Does get stiff with prolonged sitting at work.      OBJECTIVE:     Observation:  Moderate lumbar tenderness (global)         Test measurements:  Taken on 22 ROM trunk flex: 90%, ext: 75%, side bend: 75% L & R        MMT R hip flex: 4+/5, R hip abd: 4/5, L hip flex 4/5, L hip abd: 4-/5    RESTRICTIONS/PRECAUTIONS:     Exercises/Interventions:       Therapeutic Ex (31309) Sets/sec Reps Notes/CUES   LTR  10 x 5\"    ktc   10 x 10\"    Piriformis   2 x 20' B    Hamstring - 90/90  2 x 20\" B NV   Child pose   10\" x 10     Bridges   20x     Clamshells - SL   25x B    TA set / march  15x B    Prone glut set  20 x 5\"    SLR ext - EOT    NV   Bird dog - UE only  10x B                Bike   8'  NV               Desk ergonomics & set up         Manual Intervention ([phone])      Bilateral long axis distraction in prone, Lumbar PA mobs, sacral distraction, STM bilateral piriformis/glut med , lumbar MFR     10'                                                                                                                                                       Access Code: [email or web address]  URL: [email or web address]  Date: 03/16/2022  Prepared by: Mishel 63 - 1-2 x daily - 7 x weekly - 2 sets - 10 reps  Quadruped Rock Back (BKA) - 1-2 x daily - 7 x weekly - 1 sets - 10 reps - 5 hold  Supine Piriformis Stretch Pulling Heel to Hip - 1-2 x daily - 7 x weekly - 1 sets - 2 reps - 20 hold  Lower Trunk Rotations - 1-2 x daily - 7 x weekly - 1 sets - 10 reps - 5 hold    Therapeutic Exercise and NMR EXR  [x] (63339) Provided verbal/tactile cueing for activities related to strengthening, flexibility, endurance, ROM for improvements in LE, proximal hip, and core control with self care, mobility, lifting, ambulation.  [] (31018) Provided verbal/tactile cueing for activities related to improving balance, coordination, kinesthetic sense, posture, motor skill, proprioception to assist with LE, proximal hip, and core control in self-care, mobility, lifting, ambulation and eccentric single leg control.      NMR and Therapeutic Activities:    [x] (44262 or 35024) Provided verbal/tactile cueing for activities related to improving balance, coordination, kinesthetic sense, posture, motor skill, proprioception and motor activation to allow for proper function of core, proximal hip and LE with self-care and ADLs and functional mobility.   [] (85618) Gait Re-education- Provided training and instruction to the patient for proper LE, core and proximal hip recruitment and positioning and eccentric body weight control with ambulation re-education including up and down stairs     Home Exercise Program:    [] (93587) Reviewed/Progressed HEP activities related to strengthening, flexibility, endurance, ROM of core, proximal hip and LE for functional self-care, mobility, lifting and ambulation/stair navigation   [] (33310) Reviewed/Progressed HEP activities related to improving balance, coordination, kinesthetic sense, posture, motor skill, proprioception of core, proximal hip and LE for self-care, mobility, lifting, and ambulation/stair navigation      Manual Treatments:  PROM / STM / Oscillations-Mobs:  G-I, II, III, IV (PA's, Inf., Post.)  [x] (22708) Provided manual therapy to mobilize LE, proximal hip and/or LS spine soft tissue/joints for the purpose of modulating pain, promoting relaxation, increasing ROM, reducing/eliminating soft tissue swelling/inflammation/restriction, improving soft tissue extensibility and allowing for proper ROM for normal function with self-care, mobility, lifting and ambulation. Modalities:  EGS + CP 15'   [] GAME READY (VASO)- for significant edema, swelling, pain control. Charges:  Timed Code Treatment Minutes: 39'   Total Treatment Minutes: 61'       [] EVAL (LOW) 98287 (typically 20 minutes face-to-face)  [] EVAL (MOD) 77540 (typically 30 minutes face-to-face)  [] EVAL (HIGH) 71309 (typically 45 minutes face-to-face)  [] RE-EVAL      [x] CK(44368) x   2  [] IONTO  [] NMR (76220) x     [] VASO  [x] Manual (34504)  x1      [] Other:  [] TA x      [] Mech Traction (67999)  [] ES(attended) (94475)      [x] ES (un) (73597):         ASSESSMENT: tolerated Rx well with no increase in symptoms. GOALS:   Patient stated goal:     [] Progressing: [] Met: [] Not Met: [] Adjusted    Therapist goals for Patient:   Short Term Goals: To be achieved in: 2 weeks  1. Independent in HEP and progression per patient tolerance, in order to prevent re-injury. [x] Progressing: [] Met: [] Not Met: [] Adjusted   2. Patient will have a decrease in pain to facilitate improvement in movement, function, and ADLs as indicated by Functional Deficits. [x] Progressing: [] Met: [] Not Met: [] Adjusted    Long Term Goals:  To be achieved in:  8 weeks  - The patient is expected to demonstrate less than % impairment, limitation or restriction in:     - changing and maintaining body position     - Patient will demonstrate an increase in Strength to 4/5 hips to allow for proper functional mobility as indicated by patients Functional Deficits. [x] Progressing: [] Met: [] Not Met: [] Adjusted  - Patient will return to previous functional activities without increased symptoms or restriction. [x] Progressing: [] Met: [] Not Met: [] Adjusted  -patient will demonstrate wnl lumbar rom  [x] Progressing: [] Met: [] Not Met: [] Adjusted  -minimal lumbar point tenderness   [x] Progressing: [] Met: [] Not Met: [] Adjusted        Overall Progression Towards Functional goals/ Treatment Progress Update:  [] Patient is progressing as expected towards functional goals listed. [] Progression is slowed due to complexities/Impairments listed. [] Progression has been slowed due to co-morbidities.   [x] Plan just implemented, too soon to assess goals progression <30days   [] Goals require adjustment due to lack of progress  [] Patient is not progressing as expected and requires additional follow up with physician  [] Other    Prognosis for POC: [x] Good [] Fair  [] Poor      Patient requires continued skilled intervention: [x] Yes  [] No    Treatment/Activity Tolerance:  [x] Patient able to complete treatment  [] Patient limited by fatigue  [] Patient limited by pain    [] Patient limited by other medical complications  [] Other:         Return to Play: (if applicable)   []  Stage 1: Intro to Strength   []  Stage 2: Return to Run and Strength   []  Stage 3: Return to Jump and Strength   []  Stage 4: Dynamic Strength and Agility   []  Stage 5: Sport Specific Training     []  Ready to Return to Play, Meets All Above Stages   []  Not Ready for Return to Sports   Comments:                               PLAN:   [x] Continue per plan of care [] Alter current plan (see comments above)  [] Plan of care initiated [] Hold pending MD visit [] Discharge      Electronically signed by: Kenn Fernández PT, OMT-C      Therapist was present, directed the patient's care, made skilled judgement, and was responsible for assessment and treatment of the patient. Note: If patient does not return for scheduled/ recommended follow up visits, this note will serve as a discharge from care along with most recent update on progress.

## 2022-04-18 ENCOUNTER — HOSPITAL ENCOUNTER (OUTPATIENT)
Dept: PHYSICAL THERAPY | Age: 52
Setting detail: THERAPIES SERIES
Discharge: HOME | End: 2022-04-18
Payer: COMMERCIAL

## 2022-04-18 PROCEDURE — 97140 MANUAL THERAPY 1/> REGIONS: CPT | Performed by: SPECIALIST/TECHNOLOGIST

## 2022-04-18 PROCEDURE — 97110 THERAPEUTIC EXERCISES: CPT | Performed by: SPECIALIST/TECHNOLOGIST

## 2022-04-18 NOTE — FLOWSHEET NOTE
[facility] and [address],  Sports Performance and Rehabilitation, [facility] [address]  [address] [facility],5[address]   Richa Juárez  Phone: [phone]  Fax: [phone]         Physical Therapy Treatment Note/ Progress Report:       Date:  2022  Patient Name:  Mac Alba    :  1970  MRN: [id number]  Restrictions/Precautions:    Medical/Treatment Diagnosis Information:    lbp - m54.5      Insurance/Certification information:     Physician Information:     Has the plan of care been signed (Y/N):        []  Yes  [x]  No     Date of Patient follow up with Physician:       Is this a Progress Report:     []  Yes  [x]  No        If Yes:  Date Range for reporting period:  Beginning  Ending    Progress report will be due (20 Rx or 30 days whichever is less):  3/83/01       Recertification will be due (POC Duration  / 90 days whichever is less):     MOD LILIBETH 30%   Date 22    Visit # Insurance Allowable Auth Required   12 No auth - 30 visits  []  Yes     []  No            Pain 0/10    Subjective:   Pt reports feeling really good today.       OBJECTIVE:     Observation:  Moderate lumbar tenderness (global)         Test measurements:  Taken on 22 ROM trunk flex: 90%, ext: 75%, side bend: 75% L & R        MMT R hip flex: 4+/5, R hip abd: 4/5, L hip flex 4/5, L hip abd: 4-/5    RESTRICTIONS/PRECAUTIONS:     Exercises/Interventions:       Therapeutic Ex (61185) Sets/sec Reps Notes/CUES   LTR  10 x 5\" NV   ktc   10 x 10\"    Piriformis   2 x 20' B NV   Hamstring - 90/90  2 x 20\" B    Child pose   10\" x 10     Bridges   20x     Clamshells - SL   25x B    TA set / march  15x B    Prone glut set  20 x 5\"    SLR ext - EOT    NV   Bird dog - UE only  2x10 B                Bike   7'                 Desk ergonomics & set up         Manual Intervention (70056)      Bilateral long axis distraction in prone, Lumbar PA mobs, sacral distraction, STM bilateral piriformis/glut med , lumbar MFR     10'                                                                                                                                                       Access Code: [email or web address]  URL: [email or web address]  Date: 03/16/2022  Prepared by: Mishel 63 - 1-2 x daily - 7 x weekly - 2 sets - 10 reps  Quadruped Rock Back (BKA) - 1-2 x daily - 7 x weekly - 1 sets - 10 reps - 5 hold  Supine Piriformis Stretch Pulling Heel to Hip - 1-2 x daily - 7 x weekly - 1 sets - 2 reps - 20 hold  Lower Trunk Rotations - 1-2 x daily - 7 x weekly - 1 sets - 10 reps - 5 hold    Therapeutic Exercise and NMR EXR  [x] (69277) Provided verbal/tactile cueing for activities related to strengthening, flexibility, endurance, ROM for improvements in LE, proximal hip, and core control with self care, mobility, lifting, ambulation.  [] (46831) Provided verbal/tactile cueing for activities related to improving balance, coordination, kinesthetic sense, posture, motor skill, proprioception to assist with LE, proximal hip, and core control in self-care, mobility, lifting, ambulation and eccentric single leg control.      NMR and Therapeutic Activities:    [x] (66480 or 75845) Provided verbal/tactile cueing for activities related to improving balance, coordination, kinesthetic sense, posture, motor skill, proprioception and motor activation to allow for proper function of core, proximal hip and LE with self-care and ADLs and functional mobility.   [] (26457) Gait Re-education- Provided training and instruction to the patient for proper LE, core and proximal hip recruitment and positioning and eccentric body weight control with ambulation re-education including up and down stairs     Home Exercise Program:    [] (34279) Reviewed/Progressed HEP activities related to strengthening, flexibility, endurance, ROM of core, proximal hip and LE for functional self-care, mobility, lifting and ambulation/stair navigation   [] (70467) Reviewed/Progressed HEP activities related to improving balance, coordination, kinesthetic sense, posture, motor skill, proprioception of core, proximal hip and LE for self-care, mobility, lifting, and ambulation/stair navigation      Manual Treatments:  PROM / STM / Oscillations-Mobs:  G-I, II, III, IV (PA's, Inf., Post.)  [x] (87057) Provided manual therapy to mobilize LE, proximal hip and/or LS spine soft tissue/joints for the purpose of modulating pain, promoting relaxation, increasing ROM, reducing/eliminating soft tissue swelling/inflammation/restriction, improving soft tissue extensibility and allowing for proper ROM for normal function with self-care, mobility, lifting and ambulation. Modalities:  Pt decline today. [] GAME READY (VASO)- for significant edema, swelling, pain control. Charges:  Timed Code Treatment Minutes: 39'   Total Treatment Minutes: 39'       [] EVAL (LOW) 67737 (typically 20 minutes face-to-face)  [] EVAL (MOD) 41752 (typically 30 minutes face-to-face)  [] EVAL (HIGH) 15817 (typically 45 minutes face-to-face)  [] RE-EVAL      [x] TM(54107) x   2  [] IONTO  [] NMR (67715) x     [] VASO  [x] Manual (05853)  x1      [] Other:  [] TA x      [] Mech Traction (80165)  [] ES(attended) (92142)      [] ES (un) (03693):         ASSESSMENT:  Pt tolerated tx session well. Improved activity tolerance. Progressed strengthening therex this visit to increase support for low back. No increase in symptoms throughout tx session. Continues to require skilled PT services to increase ROM, strength, stability, and endurance to complete functional tasks w/o pain. GOALS:   Patient stated goal:     [] Progressing: [] Met: [] Not Met: [] Adjusted    Therapist goals for Patient:   Short Term Goals: To be achieved in: 2 weeks  1. Independent in HEP and progression per patient tolerance, in order to prevent re-injury.      [x] Progressing: [] Met: [] Not Met: [] Adjusted   2. Patient will have a decrease in pain to facilitate improvement in movement, function, and ADLs as indicated by Functional Deficits. [x] Progressing: [] Met: [] Not Met: [] Adjusted    Long Term Goals: To be achieved in:  8 weeks  - The patient is expected to demonstrate less than % impairment, limitation or restriction in:     - changing and maintaining body position     - Patient will demonstrate an increase in Strength to 4/5 hips to allow for proper functional mobility as indicated by patients Functional Deficits. [x] Progressing: [] Met: [] Not Met: [] Adjusted  - Patient will return to previous functional activities without increased symptoms or restriction. [x] Progressing: [] Met: [] Not Met: [] Adjusted  -patient will demonstrate wnl lumbar rom  [x] Progressing: [] Met: [] Not Met: [] Adjusted  -minimal lumbar point tenderness   [x] Progressing: [] Met: [] Not Met: [] Adjusted        Overall Progression Towards Functional goals/ Treatment Progress Update:  [] Patient is progressing as expected towards functional goals listed. [] Progression is slowed due to complexities/Impairments listed. [] Progression has been slowed due to co-morbidities.   [x] Plan just implemented, too soon to assess goals progression <30days   [] Goals require adjustment due to lack of progress  [] Patient is not progressing as expected and requires additional follow up with physician  [] Other    Prognosis for POC: [x] Good [] Fair  [] Poor      Patient requires continued skilled intervention: [x] Yes  [] No    Treatment/Activity Tolerance:  [x] Patient able to complete treatment  [] Patient limited by fatigue  [] Patient limited by pain    [] Patient limited by other medical complications  [] Other:         Return to Play: (if applicable)   []  Stage 1: Intro to Strength   []  Stage 2: Return to Run and Strength   []  Stage 3: Return to Jump and Strength   []  Stage 4: Dynamic Strength and Agility   [] Stage 5: Sport Specific Training     []  Ready to Return to Play, Meets All Above Stages   []  Not Ready for Return to Sports   Comments:                               PLAN:   [x] Continue per plan of care [] Alter current plan (see comments above)  [] Plan of care initiated [] Hold pending MD visit [] Discharge      Electronically signed by: Bruno Murphy, Via Alis Hull, Lydia Rater PT, OMT-C    Therapist was present, directed the patient's care, made skilled judgement, and was responsible for assessment and treatment of the patient. Note: If patient does not return for scheduled/ recommended follow up visits, this note will serve as a discharge from care along with most recent update on progress.

## 2022-04-19 NOTE — PROGRESS NOTES
I have reviewed and agree to the content of the note created by the Physical Therapist Assistant.     Electronically signed by Kevin Sandoval PT

## 2022-04-20 ENCOUNTER — APPOINTMENT (OUTPATIENT)
Dept: PHYSICAL THERAPY | Age: 52
End: 2022-04-20
Payer: COMMERCIAL

## 2022-04-25 ENCOUNTER — HOSPITAL ENCOUNTER (OUTPATIENT)
Dept: PHYSICAL THERAPY | Age: 52
Setting detail: THERAPIES SERIES
Discharge: HOME | End: 2022-04-25
Payer: COMMERCIAL

## 2022-04-25 PROCEDURE — 97140 MANUAL THERAPY 1/> REGIONS: CPT | Performed by: SPECIALIST/TECHNOLOGIST

## 2022-04-25 PROCEDURE — 97110 THERAPEUTIC EXERCISES: CPT | Performed by: SPECIALIST/TECHNOLOGIST

## 2022-04-25 PROCEDURE — G0283 ELEC STIM OTHER THAN WOUND: HCPCS | Performed by: SPECIALIST/TECHNOLOGIST

## 2022-04-25 NOTE — FLOWSHEET NOTE
The [facility] and [address] [place] [address]. [address],  [facility], [place] [address]  793 [facility],5Th Floor   Richa Juárez  Phone: [phone]  Fax: [phone]         Physical Therapy Treatment Note/ Progress Report:       Date:  2022  Patient Name:  Clarisse Limon    :  1970  MRN: [id number]  Restrictions/Precautions:    Medical/Treatment Diagnosis Information:    lbp - m54.5      Insurance/Certification information:     Physician Information:     Has the plan of care been signed (Y/N):        []  Yes  [x]  No     Date of Patient follow up with Physician:       Is this a Progress Report:     []  Yes  [x]  No        If Yes:  Date Range for reporting period:  Beginning  Ending    Progress report will be due (20 Rx or 30 days whichever is less):         Recertification will be due (POC Duration  / 90 days whichever is less):     MOD LILIBETH 30%   Date 22    Visit # Insurance Allowable Auth Required   13 No auth - 30 visits  []  Yes     []  No            Pain 3/10    Subjective:   Pt reports her back was feeling really good until she went kayaking on 22). Pt reports her low back has been sore an painful since going kayaking.      OBJECTIVE:     Observation:  Moderate lumbar tenderness (global)         Test measurements:  Taken on 22 ROM trunk flex: 90%, ext: 75%, side bend: 75% L & R        MMT R hip flex: 4+/5, R hip abd: 4/5, L hip flex 4/5, L hip abd: 4-/5    RESTRICTIONS/PRECAUTIONS:     Exercises/Interventions:       Therapeutic Ex (65716) Sets/sec Reps Notes/CUES   LTR  10 x 5\"    ktc   10 x 10\"    Piriformis   2 x 20' B    Hamstring - 90/90  2 x 20\" B    Child pose   10\" x 5  Held Reps D/T increase pain   Bridges   20x  Attempted but was unable    Clamshells - SL   20x B    TA set / march  15x B    Prone glut set  20 x 5\"    SLR ext - EOT    NV   Bird dog - UE only  2x10 B NV               Bike   5' Desk ergonomics & set up         Manual Intervention (92245)      Bilateral long axis distraction in prone, Lumbar PA mobs, sacral distraction, STM bilateral piriformis/glut med , lumbar MFR     10'                                                                                                                                                       Access Code: [email or web address]  URL: [email or web address]  Date: 03/16/2022  Prepared by: Mishel 63 - 1-2 x daily - 7 x weekly - 2 sets - 10 reps  Quadruped Rock Back (BKA) - 1-2 x daily - 7 x weekly - 1 sets - 10 reps - 5 hold  Supine Piriformis Stretch Pulling Heel to Hip - 1-2 x daily - 7 x weekly - 1 sets - 2 reps - 20 hold  Lower Trunk Rotations - 1-2 x daily - 7 x weekly - 1 sets - 10 reps - 5 hold    Therapeutic Exercise and NMR EXR  [x] (91740) Provided verbal/tactile cueing for activities related to strengthening, flexibility, endurance, ROM for improvements in LE, proximal hip, and core control with self care, mobility, lifting, ambulation.  [] (24593) Provided verbal/tactile cueing for activities related to improving balance, coordination, kinesthetic sense, posture, motor skill, proprioception to assist with LE, proximal hip, and core control in self-care, mobility, lifting, ambulation and eccentric single leg control.      NMR and Therapeutic Activities:    [x] (99113 or 60571) Provided verbal/tactile cueing for activities related to improving balance, coordination, kinesthetic sense, posture, motor skill, proprioception and motor activation to allow for proper function of core, proximal hip and LE with self-care and ADLs and functional mobility.   [] (51477) Gait Re-education- Provided training and instruction to the patient for proper LE, core and proximal hip recruitment and positioning and eccentric body weight control with ambulation re-education including up and down stairs     Home Exercise Program:    [] (03292) Reviewed/Progressed HEP activities related to strengthening, flexibility, endurance, ROM of core, proximal hip and LE for functional self-care, mobility, lifting and ambulation/stair navigation   [] (69513) Reviewed/Progressed HEP activities related to improving balance, coordination, kinesthetic sense, posture, motor skill, proprioception of core, proximal hip and LE for self-care, mobility, lifting, and ambulation/stair navigation      Manual Treatments:  PROM / STM / Oscillations-Mobs:  G-I, II, III, IV (PA's, Inf., Post.)  [x] (57653) Provided manual therapy to mobilize LE, proximal hip and/or LS spine soft tissue/joints for the purpose of modulating pain, promoting relaxation, increasing ROM, reducing/eliminating soft tissue swelling/inflammation/restriction, improving soft tissue extensibility and allowing for proper ROM for normal function with self-care, mobility, lifting and ambulation. Modalities:  EGS + CP 15' = seated in a chair    [] GAME READY (VASO)- for significant edema, swelling, pain control. Charges:  Timed Code Treatment Minutes: 39'   Total Treatment Minutes: 39'       [] EVAL (LOW) 82427 (typically 20 minutes face-to-face)  [] EVAL (MOD) 38633 (typically 30 minutes face-to-face)  [] EVAL (HIGH) 54028 (typically 45 minutes face-to-face)  [] RE-EVAL      [x] HT(32255) x   2  [] IONTO  [] NMR (94864) x     [] VASO  [x] Manual (80824)  x1      [] Other:  [] TA x      [] Mech Traction (07890)  [] ES(attended) (45491)      [x] ES (un) (27540):         ASSESSMENT:  Pt tolerated tx session well. Improved activity tolerance. Progressed strengthening therex this visit to increase support for low back. Modify tx today D/T increase LBP symptoms. Continues to require skilled PT services to increase ROM, strength, stability, and endurance to complete functional tasks w/o pain.       GOALS:   Patient stated goal:     [] Progressing: [] Met: [] Not Met: [] Adjusted    Therapist goals for Patient: Short Term Goals: To be achieved in: 2 weeks  1. Independent in HEP and progression per patient tolerance, in order to prevent re-injury. [x] Progressing: [] Met: [] Not Met: [] Adjusted   2. Patient will have a decrease in pain to facilitate improvement in movement, function, and ADLs as indicated by Functional Deficits. [x] Progressing: [] Met: [] Not Met: [] Adjusted    Long Term Goals: To be achieved in:  8 weeks  - The patient is expected to demonstrate less than % impairment, limitation or restriction in:     - changing and maintaining body position     - Patient will demonstrate an increase in Strength to 4/5 hips to allow for proper functional mobility as indicated by patients Functional Deficits. [x] Progressing: [] Met: [] Not Met: [] Adjusted  - Patient will return to previous functional activities without increased symptoms or restriction. [x] Progressing: [] Met: [] Not Met: [] Adjusted  -patient will demonstrate wnl lumbar rom  [x] Progressing: [] Met: [] Not Met: [] Adjusted  -minimal lumbar point tenderness   [x] Progressing: [] Met: [] Not Met: [] Adjusted        Overall Progression Towards Functional goals/ Treatment Progress Update:  [] Patient is progressing as expected towards functional goals listed. [] Progression is slowed due to complexities/Impairments listed. [] Progression has been slowed due to co-morbidities.   [x] Plan just implemented, too soon to assess goals progression <30days   [] Goals require adjustment due to lack of progress  [] Patient is not progressing as expected and requires additional follow up with physician  [] Other    Prognosis for POC: [x] Good [] Fair  [] Poor      Patient requires continued skilled intervention: [x] Yes  [] No    Treatment/Activity Tolerance:  [x] Patient able to complete treatment  [] Patient limited by fatigue  [] Patient limited by pain    [] Patient limited by other medical complications  [] Other:         Return to Play: (if applicable)   []  Stage 1: Intro to Strength   []  Stage 2: Return to Run and Strength   []  Stage 3: Return to Jump and Strength   []  Stage 4: Dynamic Strength and Agility   []  Stage 5: Sport Specific Training     []  Ready to Return to Play, Meets All Above Stages   []  Not Ready for Return to Sports   Comments:                               PLAN:   [x] Continue per plan of care [] Alter current plan (see comments above)  [] Plan of care initiated [] Hold pending MD visit [] Discharge      Electronically signed by:  Vanessa William, PTA  [id number], James Teixeira PT, OMT-C    . Note: If patient does not return for scheduled/ recommended follow up visits, this note will serve as a discharge from care along with most recent update on progress.

## 2022-04-25 NOTE — PROGRESS NOTES
I have reviewed and agree to the content of the note created by the Physical Therapist Assistant.     Electronically signed by Gordon Guardaod PT

## 2022-05-02 ENCOUNTER — HOSPITAL ENCOUNTER (OUTPATIENT)
Dept: PHYSICAL THERAPY | Age: 52
Setting detail: THERAPIES SERIES
Discharge: HOME | End: 2022-05-02
Payer: COMMERCIAL

## 2022-05-02 PROCEDURE — 97140 MANUAL THERAPY 1/> REGIONS: CPT | Performed by: SPECIALIST/TECHNOLOGIST

## 2022-05-02 PROCEDURE — 97110 THERAPEUTIC EXERCISES: CPT | Performed by: SPECIALIST/TECHNOLOGIST

## 2022-05-02 NOTE — FLOWSHEET NOTE
The [facility] and [address],  Sports [facility] Rehabilitation, [facility] [address]199 [address]  793 [facility],5Th Floor   Richa Juárez  Phone: [phone]  Fax: [phone]         Physical Therapy Treatment Note/ Progress Report:       Date:  2022  Patient Name:  Harrison Machado    :  1970  MRN: [id number]  Restrictions/Precautions:    Medical/Treatment Diagnosis Information:    lbp - m54.5      Insurance/Certification information:     Physician Information:     Has the plan of care been signed (Y/N):        []  Yes  [x]  No     Date of Patient follow up with Physician:       Is this a Progress Report:     []  Yes  [x]  No        If Yes:  Date Range for reporting period:  Beginning  Ending    Progress report will be due (20 Rx or 30 days whichever is less):         Recertification will be due (POC Duration  / 90 days whichever is less):     MOD LILIBETH 30%   Date 22    Visit # Insurance Allowable Auth Required   14 No auth - 30 visits  []  Yes     []  No            Pain 0/10    Subjective:   Pt reports her back feels great.   OBJECTIVE:     Observation:  Moderate lumbar tenderness (global)         Test measurements:  Taken on 22 ROM trunk flex: 90%, ext: 75%, side bend: 75% L & R        MMT R hip flex: 4+/5, R hip abd: 4/5, L hip flex 4/5, L hip abd: 4-/5    RESTRICTIONS/PRECAUTIONS:     Exercises/Interventions:       Therapeutic Ex (34987) Sets/sec Reps Notes/CUES   LTR  10 x 5\"    ktc   10 x 10\" B    Piriformis   2 x 20' B    Hamstring - 90/90  2 x 20\" B    Child pose     Held Reps D/T increase pain   Bridges   20x  Pain free    Clamshells - SL   20x B    TA set / march  15x B    Prone glut set      SLR ext - EOT   X 10 alt     Bird dog - UE only  2x10 B NV         TB core lateral step  Blue 10 x 5\" B     Bike   5'                 Desk ergonomics & set up         Manual Intervention (79163)      Bilateral long axis distraction in prone, Lumbar PA mobs, sacral distraction, STM bilateral piriformis/glut med , lumbar MFR     12'                                                                                                                                                       Access Code: [email or web address]  URL: [email or web address]  Date: 03/16/2022  Prepared by: Mishel 63 - 1-2 x daily - 7 x weekly - 2 sets - 10 reps  Quadruped Rock Back (BKA) - 1-2 x daily - 7 x weekly - 1 sets - 10 reps - 5 hold  Supine Piriformis Stretch Pulling Heel to Hip - 1-2 x daily - 7 x weekly - 1 sets - 2 reps - 20 hold  Lower Trunk Rotations - 1-2 x daily - 7 x weekly - 1 sets - 10 reps - 5 hold    Therapeutic Exercise and NMR EXR  [x] (96642) Provided verbal/tactile cueing for activities related to strengthening, flexibility, endurance, ROM for improvements in LE, proximal hip, and core control with self care, mobility, lifting, ambulation.  [] (01848) Provided verbal/tactile cueing for activities related to improving balance, coordination, kinesthetic sense, posture, motor skill, proprioception to assist with LE, proximal hip, and core control in self-care, mobility, lifting, ambulation and eccentric single leg control.      NMR and Therapeutic Activities:    [x] (00361 or 19209) Provided verbal/tactile cueing for activities related to improving balance, coordination, kinesthetic sense, posture, motor skill, proprioception and motor activation to allow for proper function of core, proximal hip and LE with self-care and ADLs and functional mobility.   [] (10845) Gait Re-education- Provided training and instruction to the patient for proper LE, core and proximal hip recruitment and positioning and eccentric body weight control with ambulation re-education including up and down stairs     Home Exercise Program:    [] (44647) Reviewed/Progressed HEP activities related to strengthening, flexibility, endurance, ROM of core, proximal hip and LE for functional self-care, mobility, lifting and ambulation/stair navigation   [] (56998) Reviewed/Progressed HEP activities related to improving balance, coordination, kinesthetic sense, posture, motor skill, proprioception of core, proximal hip and LE for self-care, mobility, lifting, and ambulation/stair navigation      Manual Treatments:  PROM / STM / Oscillations-Mobs:  G-I, II, III, IV (PA's, Inf., Post.)  [x] (36081) Provided manual therapy to mobilize LE, proximal hip and/or LS spine soft tissue/joints for the purpose of modulating pain, promoting relaxation, increasing ROM, reducing/eliminating soft tissue swelling/inflammation/restriction, improving soft tissue extensibility and allowing for proper ROM for normal function with self-care, mobility, lifting and ambulation. Modalities:     [] GAME READY (VASO)- for significant edema, swelling, pain control. Charges:  Timed Code Treatment Minutes: 39'   Total Treatment Minutes: 39'       [] EVAL (LOW) 55127 (typically 20 minutes face-to-face)  [] EVAL (MOD) 55077 (typically 30 minutes face-to-face)  [] EVAL (HIGH) 94415 (typically 45 minutes face-to-face)  [] RE-EVAL      [x] MG(29757) x   2  [] IONTO  [] NMR (30224) x     [] VASO  [x] Manual (17014)  x1      [] Other:  [] TA x      [] Mech Traction (38825)  [] ES(attended) (15363)      [] ES (un) (60364):         ASSESSMENT:  Pt tolerated tx session well. Improved activity tolerance. Progressed strengthening therex this visit to increase support for low back. Modify tx today D/T increase LBP symptoms. Continues to require skilled PT services to increase ROM, strength, stability, and endurance to complete functional tasks w/o pain. GOALS:   Patient stated goal:     [] Progressing: [] Met: [] Not Met: [] Adjusted    Therapist goals for Patient:   Short Term Goals: To be achieved in: 2 weeks  1. Independent in HEP and progression per patient tolerance, in order to prevent re-injury.      [x] Progressing: [] Met: [] Not Met: [] Adjusted   2. Patient will have a decrease in pain to facilitate improvement in movement, function, and ADLs as indicated by Functional Deficits. [x] Progressing: [] Met: [] Not Met: [] Adjusted    Long Term Goals: To be achieved in:  8 weeks  - The patient is expected to demonstrate less than % impairment, limitation or restriction in:     - changing and maintaining body position     - Patient will demonstrate an increase in Strength to 4/5 hips to allow for proper functional mobility as indicated by patients Functional Deficits. [x] Progressing: [] Met: [] Not Met: [] Adjusted  - Patient will return to previous functional activities without increased symptoms or restriction. [x] Progressing: [] Met: [] Not Met: [] Adjusted  -patient will demonstrate wnl lumbar rom  [x] Progressing: [] Met: [] Not Met: [] Adjusted  -minimal lumbar point tenderness   [x] Progressing: [] Met: [] Not Met: [] Adjusted        Overall Progression Towards Functional goals/ Treatment Progress Update:  [] Patient is progressing as expected towards functional goals listed. [] Progression is slowed due to complexities/Impairments listed. [] Progression has been slowed due to co-morbidities.   [x] Plan just implemented, too soon to assess goals progression <30days   [] Goals require adjustment due to lack of progress  [] Patient is not progressing as expected and requires additional follow up with physician  [] Other    Prognosis for POC: [x] Good [] Fair  [] Poor      Patient requires continued skilled intervention: [x] Yes  [] No    Treatment/Activity Tolerance:  [x] Patient able to complete treatment  [] Patient limited by fatigue  [] Patient limited by pain    [] Patient limited by other medical complications  [] Other:         Return to Play: (if applicable)   []  Stage 1: Intro to Strength   []  Stage 2: Return to Run and Strength   []  Stage 3: Return to Jump and Strength   []  Stage 4: Dynamic Strength and Agility   []  Stage 5: Sport Specific Training     []  Ready to Return to Play, Meets [employer]   []  Not Ready for Return to Sports   Comments:                               PLAN: D/C NV   [x] Continue per plan of care [] Alter current plan (see comments above)  [] Plan of care initiated [] Hold pending MD visit [] Discharge      Electronically signed by:  Alirio Morin, PTA  [id number], Niki Adame PT, OMT-C    . Note: If patient does not return for scheduled/ recommended follow up visits, this note will serve as a discharge from care along with most recent update on progress.

## 2022-05-02 NOTE — PROGRESS NOTES
I have reviewed and agree to the content of the note created by the Physical Therapist Assistant.     Electronically signed by Radha Burnett PT

## 2022-05-09 ENCOUNTER — HOSPITAL ENCOUNTER (OUTPATIENT)
Dept: PHYSICAL THERAPY | Age: 52
Setting detail: THERAPIES SERIES
Discharge: HOME | End: 2022-05-09
Payer: COMMERCIAL

## 2022-05-09 NOTE — FLOWSHEET NOTE
The [facility] and [address],  Sports Performance and Rehabilitation, [facility] 6199 [address]46 [address]  793 [facility],5Th Floor   [place], [address] [place]  Phone: [phone]  Fax: [phone]      Physical Therapy  Cancellation/No-show Note  Patient Name:  Harriett Rasmussen  :  1970   Date:  2022  Cancelled visits to date: 3  No-shows to date: 0    For today's appointment patient:  [x]  Cancelled  [x]  Rescheduled appointment  []  No-show     Reason given by patient:  []  Patient ill  []  Conflicting appointment  []  No transportation    [x]  Conflict with work  []  No reason given  []  Other:     Comments:      Electronically signed by: Mando Szymanski [address]

## 2022-05-17 ENCOUNTER — TELEMEDICINE (OUTPATIENT)
Dept: PULMONOLOGY | Age: 52
End: 2022-05-17
Payer: COMMERCIAL

## 2022-05-17 DIAGNOSIS — E66.9 NON MORBID OBESITY, UNSPECIFIED OBESITY TYPE: Chronic | ICD-10-CM

## 2022-05-17 DIAGNOSIS — G47.33 OBSTRUCTIVE APNEA: Primary | Chronic | ICD-10-CM

## 2022-05-17 DIAGNOSIS — K21.9 GASTROESOPHAGEAL REFLUX DISEASE, UNSPECIFIED WHETHER ESOPHAGITIS PRESENT: Chronic | ICD-10-CM

## 2022-05-17 DIAGNOSIS — F41.9 ANXIETY: Chronic | ICD-10-CM

## 2022-05-17 PROCEDURE — 3017F COLORECTAL CA SCREEN DOC REV: CPT | Performed by: NURSE PRACTITIONER

## 2022-05-17 PROCEDURE — G8427 DOCREV CUR MEDS BY ELIG CLIN: HCPCS | Performed by: NURSE PRACTITIONER

## 2022-05-17 PROCEDURE — 99214 OFFICE O/P EST MOD 30 MIN: CPT | Performed by: NURSE PRACTITIONER

## 2022-05-17 ASSESSMENT — SLEEP AND FATIGUE QUESTIONNAIRES
HOW LIKELY ARE YOU TO NOD OFF OR FALL ASLEEP WHILE WATCHING TV: 0
HOW LIKELY ARE YOU TO NOD OFF OR FALL ASLEEP WHILE SITTING AND READING: 0
HOW LIKELY ARE YOU TO NOD OFF OR FALL ASLEEP WHILE SITTING QUIETLY AFTER LUNCH WITHOUT ALCOHOL: 0
HOW LIKELY ARE YOU TO NOD OFF OR FALL ASLEEP IN A CAR, WHILE STOPPED FOR A FEW MINUTES IN TRAFFIC: 0
HOW LIKELY ARE YOU TO NOD OFF OR FALL ASLEEP WHILE SITTING AND TALKING TO SOMEONE: 0
ESS TOTAL SCORE: 1
HOW LIKELY ARE YOU TO NOD OFF OR FALL ASLEEP WHILE SITTING INACTIVE IN A PUBLIC PLACE: 0
HOW LIKELY ARE YOU TO NOD OFF OR FALL ASLEEP WHEN YOU ARE A PASSENGER IN A CAR FOR AN HOUR WITHOUT A BREAK: 0
HOW LIKELY ARE YOU TO NOD OFF OR FALL ASLEEP WHILE LYING DOWN TO REST IN THE AFTERNOON WHEN CIRCUMSTANCES PERMIT: 1

## 2022-05-17 NOTE — PROGRESS NOTES
Lawyer Shamika Alexander CNP Corey Ache  [address] [facility]  Cynmartina Ache, [address] [facility]- [phone]   [facility] [address] Juan Huerta. [address] [place] [address]. Kamini Peralta [address] [phone]     Video Visit- Follow up    Santa Spencer, was evaluated through a synchronous (real-time) audio-video  encounter. The patient (or guardian if applicable) is aware that this is a billable  service, which includes applicable co-pays. This Virtual Visit was conducted with  patient's (and/or legal guardian's) consent. The visit was conducted pursuant to  the emergency declaration under the [address] [place] [address] authority and the [employer] and  [employer] General Act. Patient identification was verified,  and a caregiver was present when appropriate. The patient was located in a  state where the provider was licensed to provide care. Assessment/Plan:      1. Obstructive apnea  Assessment & Plan:   Chronic-Stable: Reviewed and analyzed results of physiologic download from patient's machine and reviewed with patient. Supplies and parts as needed for her machine. These are medically necessary. Limit caffeine use after 3pm. Based on the analyzed data will continue with current settings. Stable on her machine at current settings, getting benefit from the use, and having minimal side effects. If she would like the auto on setting turned on her machine she will need to bring the machine to the office. Unable to adjust the setting remotely. Will see her back in 1 year or sooner if issues arise. 2. Gastroesophageal reflux disease, unspecified whether esophagitis present  Assessment & Plan:   Chronic- Stable. Discussed the importance of treating obstructive sleep apnea as part of the management of this disorder. Cont any meds per PCP and other physicians. 3. Anxiety  Assessment & Plan:  Chronic- Stable.   Discussed the importance of treating obstructive sleep apnea as part of the management of this disorder. Cont any meds per PCP and other physicians. 4. Non morbid obesity, unspecified obesity type  Assessment & Plan:   Chronic-not stable:  Discussed importance of treating obstructive sleep apnea and getting sufficient sleep to assist with weight control. Encouraged her to work on weight loss through diet and exercise. Recommended DASH or Mediterranean diets. Reviewed, analyzed, and documented physiologic data from patient's PAP machine. This information was analyzed to assess complexity and medical decision making in regards to further testing and management. The primary encounter diagnosis was Obstructive apnea. Diagnoses of Gastroesophageal reflux disease, unspecified whether esophagitis present, Anxiety, and Non morbid obesity, unspecified obesity type were also pertinent to this visit. The chronic medical conditions listed are directly related to the primary diagnosis listed above. The management of the primary diagnosis affects the secondary diagnosis and vice versa. Subjective:     Patient ID: Matthew Kirby is a 49 y.o. female. Chief Complaint   Patient presents with    Sleep Apnea     Subjective   HPI:    Machine Modem/Download Info:  Compliance (hours/night): 6.17 hrs/night  % of nights >= 4 hrs: 84 %  Download AHI (/hour): 0.7 /HR  Average CPAP Pressure : 12.6 cmH2O      APAP - Settings  Pressure Min: 12 cmH2O  Pressure Max: 20 cmH2O                 Comfort Settings  Flex/EPR (0-3): 3 PAP Mask  Mask Type: Nasal mask     Ny Polanco reports she is doing well with her machine. Newer machine does not have the auto on setting activated. Sometimes will wake for bathroom and will put the mask back on but will forget to turn the machine back on as her previous machine would automatically turn on when she put the mask on. The pressure on her machine is comfortable and she is waking rested.  she denies headaches, congestion, nosebleeds, dryness, aerophagia, or drowsiness while driving. She reports she received a letter from [employer] stating that she had 10 days to return her machine as she did not have insurance authorization. She reports they are currently working on the situation and will let us know if she receives any further letters. She met her initial compliance period on her machine but added another insurance.     [address]    [place] - Total score: 1    Current Outpatient Medications   Medication Instructions    Multiple Vitamins-Minerals (MULTIVITAMIN ADULT PO) 1 tablet, Oral, DAILY        Electronically signed by AMARILIS Rondon on 5/17/2022 at 1:26 PM

## 2022-05-17 NOTE — PROGRESS NOTES
Diagnosis: [x] LILIAN (G47.33) [] CSA (G47.31) [] Apnea (G47.30)   Length of Need: [x] 15 Months [] 99 Months [] Other:   Machine (NAVID!): [] Respironics Dream Station      Auto [] ResMed AirSense     Auto [] Other:     []  CPAP () [] Bilevel ()   Mode: [] Auto [] Spontaneous    Mode: [] Auto [] Spontaneous            Comfort Settings:      Humidifier: [] Heated ()        [x] Water chamber replacement ()/ 1 per 6 months        Mask:   [x] Nasal () /1 per 3 months [] Full Face () /1 per 3 months   [x] Patient choice -Size and fit mask [] Patient Choice - Size and fit mask   [] Dispense: [] Dispense:   [x] Headgear () / 1 per 3 months [] Headgear () / 1 per 3 months   [x] Replacement Nasal Cushion ()/2 per month [] Interface Replacement ()/1 per month   [] Replacement Nasal Pillows ()/2 per month         Tubing: [x] Heated ()/1 per 3 months    [] Standard ()/1 per 3 months [] Other:           Filters: [x] Non-disposable ()/1 per 6 months     [x] Ultra-Fine, Disposable ()/2 per month        Miscellaneous: [] Chin Strap ()/ 1 per 6 months [] O2 bleed-in:        LPM   [] Oxymetry on CPAP/Bilevel []  Other:         Start Order Date: 05/17/22    MEDICAL JUSTIFICATION:  I, the undersigned, certify that the above prescribed supplies are medically necessary for this patients wellbeing. In my opinion, the supplies are both reasonable and necessary in reference to accepted standards of medicalpractice in treatment of this patients condition. Rosamaria Ceja NP    NPI: [id number]       Order Signed Date: 05/17/22  [facility]  Pulmonary, Sleep, and Critical Care    Pulmonary, Sleep, and Critical Care  [facility] [address] [place] [address].  [facility], [address] [facility] , [facility], [place]  Phone: [phone]    Fax: [address]  1970  [address] [place] [address]  [phone] (home)   [phone] (mobile)      Insurance Info (confirm with patient if correct):  Payor/Plan Subscr  Sex Relation Sub.  Ins. ID Effective Group Num

## 2022-05-17 NOTE — LETTER
you.    Sincerely,    AMARILIS Smith    CC providers:  Elder Estrada MD  [address] [address]  Via Fax: [phone]

## 2022-05-17 NOTE — ASSESSMENT & PLAN NOTE
Chronic-Stable: Reviewed and analyzed results of physiologic download from patient's machine and reviewed with patient. Supplies and parts as needed for her machine. These are medically necessary. Limit caffeine use after 3pm. Based on the analyzed data will continue with current settings. Stable on her machine at current settings, getting benefit from the use, and having minimal side effects. If she would like the auto on setting turned on her machine she will need to bring the machine to the office. Unable to adjust the setting remotely. Will see her back in 1 year or sooner if issues arise.

## 2022-05-19 ENCOUNTER — HOSPITAL ENCOUNTER (OUTPATIENT)
Dept: PHYSICAL THERAPY | Age: 52
Setting detail: THERAPIES SERIES
Discharge: HOME | End: 2022-05-19
Payer: COMMERCIAL

## 2022-05-19 NOTE — FLOWSHEET NOTE
[facility] and [address],  Sports Performance and Rehabilitation, [facility] [address]  [address] [facility],5Th Floor   Richa Juárez  Phone: [phone]  Fax: [phone]      Physical Therapy  Cancellation/No-show Note  Patient Name:  Fermin Quintana  :  1970   Date:  2022  Cancelled visits to date: 4  No-shows to date: 0    For today's appointment patient:  [x]  Cancelled  [x]  Rescheduled appointment  []  No-show     Reason given by patient:  []  Patient ill  []  Conflicting appointment  []  No transportation    [x]  Conflict with work  []  No reason given  []  Other:     Comments:      Electronically signed by: Bola West [address]

## 2022-05-26 ENCOUNTER — HOSPITAL ENCOUNTER (OUTPATIENT)
Dept: PHYSICAL THERAPY | Age: 52
Setting detail: THERAPIES SERIES
Discharge: HOME | End: 2022-05-26
Payer: COMMERCIAL

## 2022-05-26 PROCEDURE — 97140 MANUAL THERAPY 1/> REGIONS: CPT | Performed by: SPECIALIST/TECHNOLOGIST

## 2022-05-26 PROCEDURE — 97110 THERAPEUTIC EXERCISES: CPT | Performed by: SPECIALIST/TECHNOLOGIST

## 2022-05-26 NOTE — FLOWSHEET NOTE
[facility] and [address],  Sports Performance and Rehabilitation, [facility] [address]  [address] [facility],5Th Floor   [facility], [address]  Phone: [phone]  Fax: [phone]         Physical Therapy Treatment Note/ Progress Report:       Date:  2022  Patient Name:  Yola Medina    :  1970  MRN: [id number]  Restrictions/Precautions:    Medical/Treatment Diagnosis Information:    lbp - m54.5      Insurance/Certification information:     Physician Information:     Has the plan of care been signed (Y/N):        []  Yes  [x]  No     Date of Patient follow up with Physician:       Is this a Progress Report:     []  Yes  [x]  No        If Yes:  Date Range for reporting period:  Beginning  Ending    Progress report will be due (20 Rx or 30 days whichever is less):         Recertification will be due (POC Duration  / 90 days whichever is less):     MOD LILIBETH 30%   Date 22    Visit # Insurance Allowable Auth Required   15 No auth - 30 visits  []  Yes     []  No            Pain 1/10    Subjective:   See D/C note   OBJECTIVE:     Observation:  Moderate lumbar tenderness (global)         Test measurements:  Taken on 22 ROM trunk flex: 90%, ext: 75%, side bend: 75% L & R        MMT R hip flex: 4+/5, R hip abd: 4/5, L hip flex 4/5, L hip abd: 4-/5    RESTRICTIONS/PRECAUTIONS:     Exercises/Interventions:       Therapeutic Ex (15121) Sets/sec Reps Notes/CUES   LTR  10 x 5\"    ktc       Piriformis   2 x 20' B    Hamstring - 90/90  2 x 20\" B    Child pose     Held Reps D/T increase pain   Bridges band around knees   Blue 20x  Pain free    Clamshells - SL   supine  Blue 20x B  Blue x 20     TA set / march  15x B    Prone glut set      SLR ext - EOT   X 10 alt     Bird dog - UE only  2x10 B NV         TB core lateral step      Bike                    Desk ergonomics & set up         Manual Intervention ([phone])      Bilateral long axis distraction in prone, Lumbar PA mobs, sacral distraction, STM bilateral piriformis/glut med , lumbar MFR     10'                                                                                                                                                       Access Code: [email or web address]  URL: [email or web address]  Date: 03/16/2022  Prepared by: Mishel 64 - 1-2 x daily - 7 x weekly - 2 sets - 10 reps  Quadruped Rock Back (BKA) - 1-2 x daily - 7 x weekly - 1 sets - 10 reps - 5 hold  Supine Piriformis Stretch Pulling Heel to Hip - 1-2 x daily - 7 x weekly - 1 sets - 2 reps - 20 hold  Lower Trunk Rotations - 1-2 x daily - 7 x weekly - 1 sets - 10 reps - 5 hold    Therapeutic Exercise and NMR EXR  [x] (38484) Provided verbal/tactile cueing for activities related to strengthening, flexibility, endurance, ROM for improvements in LE, proximal hip, and core control with self care, mobility, lifting, ambulation.  [] (39469) Provided verbal/tactile cueing for activities related to improving balance, coordination, kinesthetic sense, posture, motor skill, proprioception to assist with LE, proximal hip, and core control in self-care, mobility, lifting, ambulation and eccentric single leg control.      NMR and Therapeutic Activities:    [x] (35158 or 98961) Provided verbal/tactile cueing for activities related to improving balance, coordination, kinesthetic sense, posture, motor skill, proprioception and motor activation to allow for proper function of core, proximal hip and LE with self-care and ADLs and functional mobility.   [] (33979) Gait Re-education- Provided training and instruction to the patient for proper LE, core and proximal hip recruitment and positioning and eccentric body weight control with ambulation re-education including up and down stairs     Home Exercise Program:    [] (97998) Reviewed/Progressed HEP activities related to strengthening, flexibility, endurance, ROM of core, proximal hip and LE for functional self-care, mobility, lifting and ambulation/stair navigation   [] (69921) Reviewed/Progressed HEP activities related to improving balance, coordination, kinesthetic sense, posture, motor skill, proprioception of core, proximal hip and LE for self-care, mobility, lifting, and ambulation/stair navigation      Manual Treatments:  PROM / STM / Oscillations-Mobs:  G-I, II, III, IV (PA's, Inf., Post.)  [x] (90612) Provided manual therapy to mobilize LE, proximal hip and/or LS spine soft tissue/joints for the purpose of modulating pain, promoting relaxation, increasing ROM, reducing/eliminating soft tissue swelling/inflammation/restriction, improving soft tissue extensibility and allowing for proper ROM for normal function with self-care, mobility, lifting and ambulation. Modalities:     [] GAME READY (VASO)- for significant edema, swelling, pain control. Charges:  Timed Code Treatment Minutes: 39'   Total Treatment Minutes: 39'       [] EVAL (LOW) 81007 (typically 20 minutes face-to-face)  [] EVAL (MOD) 43053 (typically 30 minutes face-to-face)  [] EVAL (HIGH) 43790 (typically 45 minutes face-to-face)  [] RE-EVAL      [x] VK(88582) x   2  [] IONTO  [] NMR (04280) x     [] VASO  [x] Manual (81236)  x1      [] Other:  [] TA x      [] Mech Traction (55854)  [] ES(attended) (25113)      [] ES (un) (03461):         ASSESSMENT:  See D/C note      GOALS:   Patient stated goal:     [] Progressing: [] Met: [] Not Met: [] Adjusted    Therapist goals for Patient:   Short Term Goals: To be achieved in: 2 weeks  1. Independent in HEP and progression per patient tolerance, in order to prevent re-injury. [x] Progressing: [] Met: [] Not Met: [] Adjusted   2. Patient will have a decrease in pain to facilitate improvement in movement, function, and ADLs as indicated by Functional Deficits. [x] Progressing: [] Met: [] Not Met: [] Adjusted    Long Term Goals:  To be achieved in:  8 weeks  - The patient is expected to demonstrate less than % impairment, limitation or restriction in:     - changing and maintaining body position     - Patient will demonstrate an increase in Strength to 4/5 hips to allow for proper functional mobility as indicated by patients Functional Deficits. [x] Progressing: [] Met: [] Not Met: [] Adjusted  - Patient will return to previous functional activities without increased symptoms or restriction. [x] Progressing: [] Met: [] Not Met: [] Adjusted  -patient will demonstrate wnl lumbar rom  [x] Progressing: [] Met: [] Not Met: [] Adjusted  -minimal lumbar point tenderness   [x] Progressing: [] Met: [] Not Met: [] Adjusted        Overall Progression Towards Functional goals/ Treatment Progress Update:  [] Patient is progressing as expected towards functional goals listed. [] Progression is slowed due to complexities/Impairments listed. [] Progression has been slowed due to co-morbidities.   [x] Plan just implemented, too soon to assess goals progression <30days   [] Goals require adjustment due to lack of progress  [] Patient is not progressing as expected and requires additional follow up with physician  [] Other    Prognosis for POC: [x] Good [] Fair  [] Poor      Patient requires continued skilled intervention: [x] Yes  [] No    Treatment/Activity Tolerance:  [x] Patient able to complete treatment  [] Patient limited by fatigue  [] Patient limited by pain    [] Patient limited by other medical complications  [] Other:         Return to Play: (if applicable)   []  Stage 1: Intro to Strength   []  Stage 2: Return to Run and Strength   []  Stage 3: Return to Jump and Strength   []  Stage 4: Dynamic Strength and Agility   []  Stage 5: Sport Specific Training     []  Ready to Return to Play, Meets All Above Stages   []  Not Ready for Return to Sports   Comments:                               PLAN:   [] Continue per plan of care [] Alter current plan (see comments above)  [] Plan of care initiated [] Hold pending MD visit [x] Discharge      Electronically signed by:  Diamante Sousa, [address], Carin Lindsey PT,MPT     . Note: If patient does not return for scheduled/ recommended follow up visits, this note will serve as a discharge from care along with most recent update on progress.

## 2022-05-26 NOTE — DISCHARGE SUMMARY
[facility] and [address],  Sports Performance and Rehabilitation, [facility] [address] [place] [address]  [address] [place] [address],[address]   Richa Juárez  Phone: [phone]  Fax: [phone]        Physical Therapy Discharge  Date: 2022        Patient Name:  Roger Griffiths    :  1970  MRN: [id number]  Referring Physician:Dr. Josh Suarez  Diagnosis:LBP                        ICD Code:M54.4  [] Surgical [x] Conservative  Therapy Diagnosis/Practice Pattern:LBP      Total number of visits: 15   Reporting Period:   Beginning Date:3/4/22   End Date:22    OBJECTIVE  Test used Initial score Discharge Score   Pain Summary  7/10 1/10   Functional questionnaire Mod Oswestry 62% 14%   Functional Testing            ROM Trunk flex Gross decrease 50%  WNL    ext  WNL    SB  WNL    ROT  WNL   Strength Hip flex  4+/5 B    Hip abd 3-/5 B 4/5 B        Co-morbidities/Complexities (which will affect course of rehabilitation):   []None        Arthritic conditions   []Rheumatoid arthritis (M05.9)  [x]Osteoarthritis (M19.91) Cardiovascular conditions   []Hypertension (I10)  []Hyperlipidemia (E78.5)  []Angina pectoris (I20)  []Atherosclerosis (I70) Musculoskeletal conditions   []Disc pathology   []Congenital spine pathologies   []Prior surgical intervention  []Osteoporosis (M81.8)  []Osteopenia (M85.8)   Endocrine conditions   []Hypothyroid (E03.9)  []Hyperthyroid Gastrointestinal conditions   []Constipation (Z04.15) Metabolic conditions   []Morbid obesity (E66.01)  []Diabetes type 1(E10.65) or 2 (E11.65)   []Neuropathy (G60.9)   Pulmonary conditions   []Asthma (J45)  []Coughing   []COPD (J44.9) Psychological Disorders  []Anxiety (F41.9)  []Depression (F32.9)   []Other: []Other:           Treatment to date:  [x] Therapeutic Exercise    [] Modalities:  [] Therapeutic Activity             []Ultrasound            [x]Electrical Stimulation  [] Gait Training     []Cervical Traction    [] Lumbar Traction  [x] Neuromuscular Re-education [x] Cold/hotpack         []Iontophoresis  [x] Instruction in HEP      Other:  [x] Manual Therapy                   []                                  []   Assessment:  [x] All Goals were achieved. [] The following goals were achieved (#'s):  [] The following goals were not achieved for the following reasons:  Functional/assessment of improvement as it relates to each goal: ROM, strength, and pain levels with ADL's have improved. Pt. Is able to R/T work full time with minimal to no difficulties. Plan of Care:  [] Discharge from Therapy Services due to:    Reason for Discharge:   [] All goals achieved    [] Patient having surgery  [] Physician discontinued therapy  [] Insurance/Financial Limitations [] Patient did not return for follow up visits [] Home program/1 visit only   [] No subjective or objective improvement [] Plateaued   [] Patient was unable to adhere to the plan of care established at evaluation. [] Referred back to physician for re-evaluation and did not return.      [] Other:       Electronically signed by:

## 2022-06-28 ENCOUNTER — OFFICE VISIT (OUTPATIENT)
Dept: ORTHOPEDIC SURGERY | Age: 52
End: 2022-06-28
Payer: COMMERCIAL

## 2022-06-28 VITALS — HEIGHT: 67 IN | WEIGHT: 233 LBS | BODY MASS INDEX: 36.57 KG/M2

## 2022-06-28 DIAGNOSIS — M54.50 LOW BACK PAIN, UNSPECIFIED BACK PAIN LATERALITY, UNSPECIFIED CHRONICITY, UNSPECIFIED WHETHER SCIATICA PRESENT: Primary | ICD-10-CM

## 2022-06-28 DIAGNOSIS — M48.061 FORAMINAL STENOSIS OF LUMBAR REGION: ICD-10-CM

## 2022-06-28 DIAGNOSIS — M51.36 DISC DEGENERATION, LUMBAR: ICD-10-CM

## 2022-06-28 DIAGNOSIS — M54.16 LUMBAR RADICULOPATHY: ICD-10-CM

## 2022-06-28 PROCEDURE — 3017F COLORECTAL CA SCREEN DOC REV: CPT | Performed by: PHYSICIAN ASSISTANT

## 2022-06-28 PROCEDURE — G8427 DOCREV CUR MEDS BY ELIG CLIN: HCPCS | Performed by: PHYSICIAN ASSISTANT

## 2022-06-28 PROCEDURE — 1036F TOBACCO NON-USER: CPT | Performed by: PHYSICIAN ASSISTANT

## 2022-06-28 PROCEDURE — G8417 CALC BMI ABV UP PARAM F/U: HCPCS | Performed by: PHYSICIAN ASSISTANT

## 2022-06-28 PROCEDURE — 99213 OFFICE O/P EST LOW 20 MIN: CPT | Performed by: PHYSICIAN ASSISTANT

## 2022-06-28 NOTE — LETTER
JULITO Ann  [address] [facility] [address]  Phone: [phone]  Fax: [phone]     Jamel Lopez MD           06/28/22     Patient: Sharonda Busch   YOB: 1970   Date of Visit: 06/28/22         To Whom It May Concern:     It is my medical opinion that Jeimy Miguel may return to work on 11/15/21 with the following restrictions:  work full time with no lifting more than 5 pounds and sedentary work for the next 5 months.       If you have any questions or concerns, please don't hesitate to call.     Sincerely,             Jamel Lopez MD

## 2022-06-28 NOTE — PROGRESS NOTES
Date:  2022    Name:  Ross Angulo  Address:  [address] [place] [address]    :  1970      Age:   49 y.o.    SSN:  [id number]      Medical Record Number:  [id number]    Reason for Visit:    Chief Complaint    Back Pain (L-SPINE)      DOS:      Currently: 2022  Patient is here for evaluation regarding her lumbar spine. She notes that she is doing much better at today's visit rating pain level as a 0-110. She notes that she has been doing regular exercises and trying to maintain overall function. She is still continuing to lose weight down 35 pounds with utilization of diet and regular exercise. She notes that she still intermittently utilizes anti-inflammatories, heating pad, TENS unit, topical medications and stretches to maintain strength and control of appropriate pain. She notes that she is still trying to get her bedroom back in order after she had a significant issue with a cat and significant issues regarding urine requiring her to replace all the furniture and carpeting within the room. She has been sleeping on a couch for several weeks. 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 She notes that it is very low physical stress which is important for her body to allow time for her to heal and build strength as she moves to her rehabilitation process. Denies any fall trauma or injury          Previously:  Pain from cellulitis with continued treatments over the past 3-4 weeks.    Increase in bone pain on the left side recently down the tibia with additional rash in the area    She has been having some low back pain which has improved to some degree coinciding with some loss of weight    At one point she was put on a higher duty functional basis at work but is requesting to be back to sedentary duty again with maximum limits of 5 pounds of lifting            She is attempting to walk and regain some of her control of the abck and reduction of her weight . Using brace. Difficulty with work activites but she has continued work detail    HPI: Ciro Theodore is a 49 y.o. female here today for continued complaints of pain in the right side. Tight restrictions at work. Has missed three weeks of work release. 4 hours of work nothing over 5 pounds. Pain post injection is greater than what she has prior . Pain is related to bilateral legs. Give way of the bilateral legs. More limited activity has increased her weight by 10 + pounds over the past month     Pain in the low back. Pinching sensations of the bilateral legs posteriorly. Review of Systems:  Review of Systems   Constitutional: Negative for chills and fever. HENT: Negative for nosebleeds. Eyes: Negative for double vision. Cardiovascular: Negative for chest pain. Gastrointestinal: Negative for abdominal pain. Musculoskeletal: Positive for joint pain and myalgias. Skin: Negative for rash. Neurological: Negative for seizures. Psychiatric/Behavioral: Negative for hallucinations.         Past History:  Past Medical History:   Diagnosis Date    Anxiety     Chronic back pain     Headache(784.0)     Hx of blood clots     march 2016-was on xarelto for 6months    Obstructive apnea     Seizures ([address]     states no longer  has them-last one was in 2013-june     Past Surgical History:   Procedure Laterality Date    EPIDURAL STEROID INJECTION N/A 9/10/2019    MIDLINE LUMBAR FIVE SACRAL ONE EPIDURAL STEROID INJECTION SITE CONFIRMED BY FLUOROSCOPY performed by Alok Henderson MD at [facility] Right 2003    RIGHT KNEE    KNEE SURGERY Left 2012    LASIK Bilateral 2004    PAIN MANAGEMENT PROCEDURE Bilateral 7/6/2021    BILATERAL L3,L4 MEDIAL BRANCH BLOCKS WITH FLUOROSCOPY performed by Suly Talbot MD at [facility] (CERVIX NOT REMOVED)  11.4.16    Dr. Shawnee Mix SURGERY       Current Outpatient Medications on File Prior to Visit   Medication Sig Dispense Refill    Multiple Vitamins-Minerals (MULTIVITAMIN ADULT PO) Take 1 tablet by mouth daily       No current facility-administered medications on file prior to visit. Social History     Socioeconomic History    Marital status: Single     Spouse name: Not on file    Number of children: Not on file    Years of education: Not on file    Highest education level: Not on file   Occupational History    Occupation: Housekeeping      Employer: [address] [place] [address]: on [employer]   Tobacco Use    Smoking status: Former Smoker     Years: 1.00     Types: Cigarettes     Quit date: 1987     Years since quittin.6    Smokeless tobacco: Never Used    Tobacco comment: counseled on tobacco exposure avoidance   Vaping Use    Vaping Use: Never used   Substance and Sexual Activity    Alcohol use: No     Alcohol/week: 0.0 standard drinks    Drug use: No    Sexual activity: Never   Other Topics Concern    Not on file   Social History Narrative    19: Patient currently using two [employer] and [employer] pantry as needed for food shortages. She has also used financial assistance resources at her [employer], she is not eligable again for 6 months. AC will send via e-mail per patients request list of other local organizations that may offer food and financial assistance. Patient has also reached out to life matters and she attended one counseling session but has not rescheduled. ACM encouraged patient to continue counseling services with [employer]; ACM sent [employer] Brochure and contact number; encouraged her to reach out for other resources as well.       Social Determinants of Health     Financial Resource Strain: Low Risk     Difficulty of Paying Living Expenses: Not hard at all   Food Insecurity: No Food Insecurity    Worried About Running Out of Food in the Last Year: Never true   [employer] of Food in the Last Year: Never true   Transportation Needs:     Lack of Transportation (Medical): Not on file    Lack of Transportation (Non-Medical): Not on file   Physical Activity:     Days of Exercise per Week: Not on file    Minutes of Exercise per Session: Not on file   Stress:     Feeling of Stress : Not on file   Social Connections:     Frequency of Communication with Friends and Family: Not on file    Frequency of Social Gatherings with Friends and Family: Not on file    Attends [employer] Services: Not on file    Active Member of [address] [place] [address] [employer] or Organizations: Not on file    Attends Club or Organization Meetings: Not on file    Marital Status: Not on file   Intimate Partner Violence:     Fear of Current or Ex-Partner: Not on file    Emotionally Abused: Not on file    Physically Abused: Not on file    Sexually Abused: Not on file   Housing Stability:     Unable to Pay for Housing in the Last Year: Not on file    Number of [place] in the Last Year: Not on file    Unstable Housing in the Last Year: Not on file     Family History   Problem Relation Age of Onset    Heart Disease Father     Other Father         LILIAN    Diabetes Father     Alcohol Abuse Father     Obesity Father     Alzheimer's Disease Other         Grandparents    Breast Cancer Other         Aunt    Lung Cancer Other         Grandma    Colon Cancer Other         Grandpa    Diabetes Other         Grandma    Seizures Sister         Uncle, son       Current Medications:    Current Outpatient Medications   Medication Sig Dispense Refill    Multiple Vitamins-Minerals (MULTIVITAMIN ADULT PO) Take 1 tablet by mouth daily       No current facility-administered medications for this visit. Allergies: Allergies   Allergen Reactions    Latex Rash    Bee Pollen Swelling    Lime Flavor [Flavoring Agent] Swelling     Swelling of mouth       Physical Exam:  There were no vitals filed for this visit.     Physical Exam Constitutional: Patient is oriented to person, place, and time and well-developed, well-nourished, and in no distress. HENT:   Head: Normocephalic and atraumatic. Eyes: Pupils are equal, round, and reactive to light. Neck: No tracheal deviation present. No thyromegaly present. Pulmonary/Chest: Effort normal.   Abdominal: Soft. There is no guarding. Musculoskeletal: Patient exhibits tenderness and pain. Neurological: Patient is alert and oriented to person, place, and time. Skin: Skin is warm. Psychiatric: Affect normal.     General: Ny Mark is a healthy and well appearing 49y.o. year old female who is sitting comfortably in our office in acute distress. Alert and oriented. Physical Exam:  RUE:    No gross deformities noted. Sensation is intact to light touch throughout the median, ulnar and radial nerve distribution. Able to wiggle fingers, gives thumbs up, A-okay and cross index and middle fingers. Full range of motion of the hand, wrist, elbow and shoulder. LUE:   No gross deformities noted. Sensation is intact to light touch throughout the median, ulnar and radial nerve distribution. Able to wiggle fingers, gives thumbs up, A-okay and cross index and middle fingers. Full range of motion of the hand wrist elbow and shoulder. RLE:moving hip and knee well. Limited back motions with less hamstring irritation on the right than the left. Knee stable. Back facet maneuvers  Irritable. No gross deformities noted. Sensation is intact to light touch throughout the lower extremity. Able to wiggle toes and plantar and dorsiflex foot. Full range of motion at the ankle, knee and hip  Streak rash in the right leg with some erythema below it indicating some contact dermatitis from stockings  LLE:    Left hipwith some limited internal rotation and no major pain in the left groin. No gross deformities noted.   Sensation is intact to light touch throughout the lower extremity. Able to wiggle toes and plantar and dorsiflex foot. Full range of motion at the ankle, knee and hip  Cellulitis resolved        Diagnostics:  Xray   Have reviewed the xrays above from 06/28/22   No new x-rays taken at today's visit        1. Lumbar radiculopathy       2. Foraminal stenosis of lumbar region       3. Low back pain, unspecified back pain laterality, unspecified chronicity, unspecified whether sciatica present         Assessment: additioial compalnts of back pain which appears to have increased since last injection. She has returned with limited work duty which has been difficult for her. She wants to [place] current restrictions and continue handicapped parking. Plan: neurontin and smuscle relaxats for her neurogenic pain. Potentially she should be able to manage progression of activity as her pain decresaes. Work towards additional therapy and weight loss when her mobility and pain improves. No additional ijnections at this point. With regard to the stockings in the lower extremity we have discontinued them for the potential that these are hypersensitivity reactions she did have Covid at the end of last year and this may have had something to do with increased irritability of her skin. There is no signs of any formal cellulitis at this point  Continue with exercise regiment and stretching regimen as well as medication regiment we will maintain work restrictions for the time being for the next 4 to 5 months to allow for continued healing. Follow-up in 4 to 5 months for repeat evaluation.     [unfilled]     SARI Kaplan    Date:    6/28/2022

## 2022-07-22 NOTE — FLOWSHEET NOTE
Chief Complaint:  Per triage note Pt is from [facility] and sent in due to aggressive behaviors. Per EMS pt is combative with staff and [facility] does not want him to return back. Mental Status Exam:  Pt is alert, calm and not showing any aggressive behaviors during assessment. Pt is non-verbal due to CVA which is his baseline. Pt is able to shake his head to basic yes/no answers. Normal eye contact, fair hygiene. Pt was trying to get out of bed during assessment and required prompted/ assistance to scoot up in bed. Legal Status  [] Voluntary:  [x] Involuntary, Issued by: ED physician      Gender  [x] Male [] Female [] Transgender  [] Other     Sexual Orientation    [x] Heterosexual [] Homosexual [] Bisexual [] Other     Brief Clinical Summary/ Collateral Information  Pt is 49 yo male who presented into the ED by EMS after being send in from [facility] due to becoming very aggressive with staff. Pt has presented into the ED from [facility] on 7/18, 7/20 and today for the same behavior. Per EMS [facility] does not want him to return back to facility. Pt has a hx of trying to bite, leave the facility and not comply. Pt does have a similar presentation on 7/17 when Pt trying to escape the facility in his wheelchair and was found at [facility] and became combative with staff. Pt unable to answer any questions. Per paperwork Pt admission date for [facility] was on 5/26/2021. Pt is diagnosed with major depressive disorder, recurrent unspecified ans is proscribed escitaloprám oxalate 20mg 1 table once daily. SW attempted to reach out to [facility] at [phone] (nursing #2) to collate more collateral information. At this time no one answers the unit and spoke to the  and was advised to call back in 5 minutes.      Risk Factors:  Poor communication skills  Lack of self care  Gender risk (male over 61)  Combative behavior   Chronic physical health issues - Diabetes, Hypertension, Seizures Progress Note    SUBJECTIVE:    Patient seen for f/u of Acute pyelonephritis. She resting in bed alert and oriented no acute distress. No further fever. Complains of chills. Complains of headache. Continues to have some left lower quadrant and flank pain. Planes of some constipation. Denies nausea and vomiting. She states she does not want to go home until she feels a little better. ROS:   Constitutional: negative  for fevers, and positive for chills. Respiratory: negative for shortness of breath, negative for cough, and negative for wheezing  Cardiovascular: negative for chest pain, and negative for palpitations  Gastrointestinal: positive for abdominal pain, negative for nausea,negative for vomiting, negative for diarrhea, and negative for constipation     All other systems were reviewed with the patient and are negative unless otherwise stated in HPI      OBJECTIVE:      Vitals:   Vitals:    07/28/21 0745   BP: 103/74   Pulse: 100   Resp: 16   Temp: 97.7 °F (36.5 °C)   SpO2: 100%     Weight: 171 lb 14.4 oz (78 kg)   Height: 5' 4\" (162.6 cm)   -----------------------------------------------------------------  Exam:    GEN:  alert and oriented to person, place and time, well-developed and well-nourished, in no acute distress  EYES:  No gross abnormalities. , PERRL and EOMI  NECK: normal, supple, no lymphadenopathy,  no carotid bruits  PULM: clear to auscultation bilaterally- no wheezes, rales or rhonchi, normal air movement, no respiratory distress  COR:   regular rate & rhythm, no murmurs, no gallops, S1 normal and S2 normal  ABD:    soft, LLQ and Left CV tenderness, non-distended, normal bowel sounds, no masses or organomegaly  EXT:    no cyanosis, clubbing or edema present    NEURO: follows commands, NÚÑEZ, no deficits  SKIN:   no rashes or significant lesions    -----------------------------------------------------------------    Diagnostic Data:    · All available data reviewed  Lab Results [facility] and [address],  [facility], [facility] 6199 1246 [address]  [address] [facility],[address]   Richa Juárez  Phone: [phone]  Fax: [phone]         Physical Therapy Treatment Note/ Progress Report:           Date:  3/16/2022  Patient Name:  Lorene Urias    :  1970  MRN: [id number]  Restrictions/Precautions:    Medical/Treatment Diagnosis Information:   lbp - m54.5      Insurance/Certification information:     Physician Information:     Has the plan of care been signed (Y/N):        []  Yes  [x]  No     Date of Patient follow up with Physician:       Is this a Progress Report:     []  Yes  [x]  No        If Yes:  Date Range for reporting period:  Beginning  Ending    Progress report will be due (10 Rx or 30 days whichever is less):        Recertification will be due (POC Duration  / 90 days whichever is less):     Subjective:  Pt. States\" My back has been really sore in the middle of my low back and I don't know why. \"    OBJECTIVE:     Observation:  Moderate lumbar tenderness (global)         Test measurements:      RESTRICTIONS/PRECAUTIONS:     Exercises/Interventions:       Therapeutic Ex (71881) Sets/sec Reps Notes/CUES   LTR  10 x 5\"    ktc   10 x 10\" NV   Piriformis   2 x 20' B    Hamstring   4 x 20\" NV   Child pose   10\" x 5     Bridges   20x     Clamshells   25x NV         Prone glut set  20 x 5\"                Bike   8'  NV                           Manual Intervention (95203)      Bilateral long axis distraction, Lumbar PA mobs, sacral distraction, STM bilateral piriformis/glut med   25'                                                                                                                                             Access Code: [email or web address]  URL: [email or web address]  Date: 2022  Prepared by: Mishel 64 - 1-2 x daily - 7 x weekly - 2 sets - 10 reps  Quadruped Rock Back (BKA) - 1-2 x daily - 7 x weekly - 1 sets - 10 reps - 5 hold  Supine Piriformis Stretch Pulling Heel to Hip - 1-2 x daily - 7 x weekly - 1 sets - 2 reps - 20 hold  Lower Trunk Rotations - 1-2 x daily - 7 x weekly - 1 sets - 10 reps - 5 hold    Therapeutic Exercise and NMR EXR  [x] (46589) Provided verbal/tactile cueing for activities related to strengthening, flexibility, endurance, ROM for improvements in LE, proximal hip, and core control with self care, mobility, lifting, ambulation. [x] (24563) Provided verbal/tactile cueing for activities related to improving balance, coordination, kinesthetic sense, posture, motor skill, proprioception to assist with LE, proximal hip, and core control in self-care, mobility, lifting, ambulation and eccentric single leg control.      NMR and Therapeutic Activities:    [x] (20520 or 62044) Provided verbal/tactile cueing for activities related to improving balance, coordination, kinesthetic sense, posture, motor skill, proprioception and motor activation to allow for proper function of core, proximal hip and LE with self-care and ADLs and functional mobility.   [] (11568) Gait Re-education- Provided training and instruction to the patient for proper LE, core and proximal hip recruitment and positioning and eccentric body weight control with ambulation re-education including up and down stairs     Home Exercise Program:    [x] (34474) Reviewed/Progressed HEP activities related to strengthening, flexibility, endurance, ROM of core, proximal hip and LE for functional self-care, mobility, lifting and ambulation/stair navigation   [] (31443) Reviewed/Progressed HEP activities related to improving balance, coordination, kinesthetic sense, posture, motor skill, proprioception of core, proximal hip and LE for self-care, mobility, lifting, and ambulation/stair navigation      Manual Treatments:  PROM / STM / Oscillations-Mobs:  G-I, II, III, IV (PA's, Inf., Post.)  [x] (05709) Provided manual Component Value Date    WBC 7.9 07/28/2021    HGB 10.3 (L) 07/28/2021    MCV 92.5 07/28/2021     07/28/2021      Lab Results   Component Value Date    GLUCOSE 145 (H) 07/27/2021    BUN 6 07/27/2021    CREATININE 0.71 07/27/2021     (L) 07/27/2021    K 3.9 07/27/2021    CALCIUM 8.2 (L) 07/27/2021     07/27/2021    CO2 22 07/27/2021       CT ABDOMEN PELVIS W IV CONTRAST Additional Contrast? None   Final Result   1. Findings compatible with multifocal pyelonephritis involving the left   kidney. No hydronephrosis. 2.  Bilateral renal calculi again demonstrated. Right renal scarring. 3.  Findings suggestive of cystitis. 4.  Incidental complex 1.7 cm lesion arising from the lower pole the left   kidney for which further evaluation with contrast-enhanced MRI or CT renal   mass protocol is recommended after therapy is completed. 5.  Liquid stool throughout the colon suggestive of diarrheal process. ASSESSMENT / PLAN:  Acute pyelonephritis  · Continue current therapy  ? IV fluids  ? Continue IV Levaquin  ? Blood cultures x2-pending  ? Urine culture-no growth  ? Pyridium 200 mg 3 times daily as needed dysuria  ? Pain control  · Lesion on left kidney pole  ? Appreciate urology-concern for abscess versus abnormal lesion  · Crohn's disease  ? Continue Remicade, Kelnor  · Type 2 diabetes  ? A1c  ? POCT before meals and at bedtime  ? Low-dose sliding scale  ? Hypoglycemia protocol  ?  Continue Glucophage  · DVT prophylaxis: Lovenox  · Peptic ulcer prophylaxis: Pepcid  · High risk medications: noneDisposition:    · Discharge plan is home tomorrow      YOSVANY Tena - CNP , APRN, NP-C and Cerebral Artery  MH diagnoses     Protective Factors:  social connectedness family   help-seeking behavior  safe and stable housing  has access to essential needs  no access to weapons     Suicidal Ideations:   [] Reports:              [] Past [x] Present  [x] Denies     Suicide Attempts: Pt unable to answer question   [] Reports:  [] Denies     C-SSRS Screening Completed by RN: Current Suicide Risk:   [x] No Risk [] Low [] Moderate [] High     Homicidal Ideations   [] Reports:              [] Past [x] Present  [] Denies     Self Injurious/Self Mutilation Behaviors:  Pt unable to answer question   [] Reports:              [] Past [] Present  [] Denies     Hallucinations/Delusions  Pt unable to answer question   [] Reports:  [] Denies     Substance Use/Alcohol Use/Addiction: Pt unable to answer question   [] Reports:  [] Denies  [] SBIRT Screen Complete. Current or Past Substance Abuse Treatment Pt unable to answer question   [] Yes, When and Where:  [] No     Current or Past Mental Health Treatment:  [x] Yes, When and Where: Pt is diagnosed with major depressive disorder, recurrent unspecified ans is proscribed escitaloprám oxalate 20mg 1 table once daily and see's Dr. Sarmad Rodas. [] No     Legal Issues: Pt unable to answer question   []  Yes (Specify)  []  No     Access to Weapons:  []  Yes (Specify)  [x]  No     Trauma History Pt unable to answer question   [] Reports:  [] Denies     Living Situation:  [address]     Employment:  Unable to answer      Education Level:  Unable to answer      Violence Risk Screening:        Have you ever thought about hurting someone? []  No  []  Yes (Ask the questions listed below)   When? Did you follow through with the thoughts? [] No     [] Yes- When and what happened? 2.         Have you ever threatened anyone? []  No  []  Yes (Ask the questions listed below)   When and what happened?    Have you ever threatened someone with a gun, knife or other therapy to mobilize LE, proximal hip and/or LS spine soft tissue/joints for the purpose of modulating pain, promoting relaxation, increasing ROM, reducing/eliminating soft tissue swelling/inflammation/restriction, improving soft tissue extensibility and allowing for proper ROM for normal function with self-care, mobility, lifting and ambulation. Modalities:  EGS + CP 15'   [] GAME READY (VASO)- for significant edema, swelling, pain control. Charges:  Timed Code Treatment Minutes: 45'    Total Treatment Minutes: 61'       [] EVAL (LOW) 69399 (typically 20 minutes face-to-face)  [] EVAL (MOD) 59647 (typically 30 minutes face-to-face)  [] EVAL (HIGH) 18057 (typically 45 minutes face-to-face)  [] RE-EVAL     [x] GV(91674) x   1  [] IONTO  [] NMR (42893) x     [] VASO  [x] Manual (03048) x  2   [] Other:  [] TA x      [] Mech Traction (12774)  [] ES(attended) (66871)      [x] ES (un) (01078):         ASSESSMENT:   TTP over bilateral piriformis / paraspinals. TC /VC with HEP for proper techniques. Pt. Reports decrease LB tightness and increase mobility after tx. GOALS:   Patient stated goal:     [] Progressing: [] Met: [] Not Met: [] Adjusted    Therapist goals for Patient:   Short Term Goals: To be achieved in: 2 weeks  1. Independent in HEP and progression per patient tolerance, in order to prevent re-injury. [x] Progressing: [] Met: [] Not Met: [] Adjusted   2. Patient will have a decrease in pain to facilitate improvement in movement, function, and ADLs as indicated by Functional Deficits. [x] Progressing: [] Met: [] Not Met: [] Adjusted    Long Term Goals: To be achieved in:  8 weeks  - The patient is expected to demonstrate less than % impairment, limitation or restriction in:     - changing and maintaining body position     - Patient will demonstrate an increase in Strength to 4/5 hips to allow for proper functional mobility as indicated by patients Functional Deficits.    [x] Progressing: weapon? []  No  []  Yes - When and what happened? 2.         Have you ever had an order of protection taken out against you? []  Yes []  No  3. Have you ever been arrested due to violence? []  Yes []  No  4. Have you ever been cruel to animals? []  Yes []  No     Unable to answer - but has been combative      After consideration of C-SSRS screening results, C-SSRS assessments, and this professional's assessment the patient's overall suicide risk assessed to be:  [] No Risk  [] Low  [x] Moderate  [] High     [x] Discussed current suicide risk, protective and risk factors with RN and ED Physician     Disposition  [] Home:  [] Outpatient Provider:  [] Crisis Unit:  [x] Inpatient Psychiatric Unit:  [] Other:    Pt has been Jarratt Slipped by ED physician. Once medically cleared, SW will proceed with inpatient admission to ensure Pt safety and stabilization.         CHEMO Dailey, [place]  07/22/22 9314 [] Met: [] Not Met: [] Adjusted  - Patient will return to previous functional activities without increased symptoms or restriction. [x] Progressing: [] Met: [] Not Met: [] Adjusted  -patient will demonstrate wnl lumbar rom  [x] Progressing: [] Met: [] Not Met: [] Adjusted  -minimal lumbar point tenderness   [x] Progressing: [] Met: [] Not Met: [] Adjusted        Overall Progression Towards Functional goals/ Treatment Progress Update:  [] Patient is progressing as expected towards functional goals listed. [] Progression is slowed due to complexities/Impairments listed. [] Progression has been slowed due to co-morbidities.   [x] Plan just implemented, too soon to assess goals progression <30days   [] Goals require adjustment due to lack of progress  [] Patient is not progressing as expected and requires additional follow up with physician  [] Other    Prognosis for POC: [x] Good [] Fair  [] Poor      Patient requires continued skilled intervention: [x] Yes  [] No    Treatment/Activity Tolerance:  [x] Patient able to complete treatment  [] Patient limited by fatigue  [] Patient limited by pain    [] Patient limited by other medical complications  [] Other:         Return to Play: (if applicable)   []  Stage 1: Intro to Strength   []  Stage 2: Return to Run and Strength   []  Stage 3: Return to Jump and Strength   []  Stage 4: Dynamic Strength and Agility   []  Stage 5: Sport Specific Training     []  Ready to Return to Play, Meets All Above Stages   []  Not Ready for Return to Sports   Comments:                               PLAN:   [x] Continue per plan of care [] Alter current plan (see comments above)  [] Plan of care initiated [] Hold pending MD visit [] Discharge      Electronically signed by:  Xander Mirza, PTA  [id number], Elana Moyer, PT,MPT     Note: If patient does not return for scheduled/ recommended follow up visits, this note will serve as a discharge from care along with most recent update on progress.

## 2022-08-05 ENCOUNTER — OFFICE VISIT (OUTPATIENT)
Dept: ORTHOPEDIC SURGERY | Age: 52
End: 2022-08-05
Payer: COMMERCIAL

## 2022-08-05 VITALS — WEIGHT: 206 LBS | BODY MASS INDEX: 32.33 KG/M2 | HEIGHT: 67 IN

## 2022-08-05 DIAGNOSIS — G89.29 CHRONIC THORACIC BACK PAIN, UNSPECIFIED BACK PAIN LATERALITY: Primary | ICD-10-CM

## 2022-08-05 DIAGNOSIS — M54.6 CHRONIC THORACIC BACK PAIN, UNSPECIFIED BACK PAIN LATERALITY: Primary | ICD-10-CM

## 2022-08-05 PROCEDURE — G8428 CUR MEDS NOT DOCUMENT: HCPCS | Performed by: PHYSICIAN ASSISTANT

## 2022-08-05 PROCEDURE — 1036F TOBACCO NON-USER: CPT | Performed by: PHYSICIAN ASSISTANT

## 2022-08-05 PROCEDURE — 99214 OFFICE O/P EST MOD 30 MIN: CPT | Performed by: PHYSICIAN ASSISTANT

## 2022-08-05 PROCEDURE — G8417 CALC BMI ABV UP PARAM F/U: HCPCS | Performed by: PHYSICIAN ASSISTANT

## 2022-08-05 PROCEDURE — 3017F COLORECTAL CA SCREEN DOC REV: CPT | Performed by: PHYSICIAN ASSISTANT

## 2022-08-05 RX ORDER — TIZANIDINE 2 MG/1
2 TABLET ORAL EVERY 8 HOURS PRN
Qty: 10 TABLET | Refills: 0 | Status: SHIPPED | OUTPATIENT
Start: 2022-08-05

## 2022-08-05 RX ORDER — PREDNISONE 10 MG/1
10 TABLET ORAL DAILY
Qty: 30 TABLET | Refills: 0
Start: 2022-08-05

## 2022-08-05 NOTE — PROGRESS NOTES
Date:  2022    Name:  García Cha  Address:  [address] [place] [address]    :  1970      Age:   49 y.o.    SSN:  [id number]      Medical Record Number:  [id number]    Reason for Visit:    Chief Complaint    Pain (Back/)      DOS:2022     HPI: Jena Genao is a 49 y.o. female here today for evaluation of thoracic spine. Of note she has seen Watson Castleman and Dr. Charlie Echeverria for her lumbar spine. She has been diagnosed with lumbar radiculopathy, foraminal stenosis of the lumbar region and has been treated with Neurontin and a muscle relaxer. She was doing okay overall however recently she has had the new complaint of thoracic pain. Patient experiences pain in her mid back associated with a sensation of tightness. Denies any recent injury to this limb. Denies any new radiculopathy. Pain Assessment  Location of Pain: Back  Location Modifiers: Posterior, Medial  Severity of Pain: 4  Quality of Pain: Other (Comment) (pinching, electrical shock)  Duration of Pain: A few minutes  Frequency of Pain: Intermittent  Date Pain First Started: 22  Limiting Behavior: Some  Result of Injury: No  Work-Related Injury: No  Are there other pain locations you wish to document?: No  ROS: Review of systems reviewed from Patient History Form completed today and available in the patient's chart under the Media tab.        Past Medical History:   Diagnosis Date    Anxiety     Chronic back pain     Headache(784.0)     Hx of blood clots     2016-was on xarelto for 6months    Obstructive apnea     Seizures ([facility] [address]     states no longer  has them-last one was in -        Past Surgical History:   Procedure Laterality Date    EPIDURAL STEROID INJECTION N/A 9/10/2019    MIDLINE LUMBAR FIVE SACRAL ONE EPIDURAL STEROID INJECTION SITE CONFIRMED BY FLUOROSCOPY performed by Bj Figueroa MD at [facility] Right     RIGHT KNEE    KNEE SURGERY Left     REECE Bilateral 2004    PAIN MANAGEMENT PROCEDURE Bilateral 2021    BILATERAL L3,L4 MEDIAL BRANCH BLOCKS WITH FLUOROSCOPY performed by Adam Chaparro MD at [facility] [address] (CERVIX NOT REMOVED)  11.4.16    Dr. Dyke Alpers History   Problem Relation Age of Onset    Heart Disease Father     Other Father         LILIAN    Diabetes Father     Alcohol Abuse Father     Obesity Father     Alzheimer's Disease Other         Grandparents    Breast Cancer Other         Aunt    Lung Cancer Other         Grandma    Colon Cancer Other         Grandpa    Diabetes Other         Grandma    Seizures Sister         Uncle, son       Social History     Socioeconomic History    Marital status: Single   Occupational History    Occupation: Housekeeping      Employer: [address] [place] [address]: on [employer]   Tobacco Use    Smoking status: Former     Years: 1.00     Types: Cigarettes     Quit date: 1987     Years since quittin.7    Smokeless tobacco: Never    Tobacco comments:     counseled on tobacco exposure avoidance   Vaping Use    Vaping Use: Never used   Substance and Sexual Activity    Alcohol use: No     Alcohol/week: 0.0 standard drinks    Drug use: No    Sexual activity: Never   Social History Narrative    19: Patient currently using two [employer] food panteries and [employer] as needed for food shortages. She has also used financial assistance resources at her [employer], she is not eligable again for 6 months. [facility] will send via e-mail per patients request list of other local organizations that may offer food and financial assistance. Patient has also reached out to life matters and she attended one counseling session but has not rescheduled. ACM encouraged patient to continue counseling services with [employer]; [facility] sent [facility] Brochure and contact number; encouraged her to reach out for other resources as well.         Current Outpatient Medications Medication Sig Dispense Refill    predniSONE (DELTASONE) 10 MG tablet Take 1 tablet by mouth in the morning. Days 1-3: 4 tabs daily; Days 4-6: 3 tabs daily; Days 7-9 2 tabs daily; Days 10-12 1 tab daily. Total #30 10mg tabs. 30 tablet 0    tiZANidine (ZANAFLEX) 2 MG tablet Take 1 tablet by mouth every 8 hours as needed (as needed for spasm) 10 tablet 0    Multiple Vitamins-Minerals (MULTIVITAMIN ADULT PO) Take 1 tablet by mouth daily       No current facility-administered medications for this visit. Allergies   Allergen Reactions    Latex Rash    Bee Pollen Swelling    Lime Flavor [Flavoring Agent] Swelling     Swelling of mouth       Vital signs:  Ht 5' 7\" (1.702 m)   Wt 206 lb (93.4 kg)   LMP 11/02/2016   BMI 32.26 kg/m²    Lumbral/sacral examination:    Gait & station: normal, patient ambulates without assistance    Inspection: Local inspection shows no step-off or bruising. Lumbar alignment is normal.    Skin: There are no rashes, ulcerations or lesions. Palpation: No paraspinous tenderness present bilaterally in lower lumbar region. Paraspinal spasm    Range of Motion: No limitation of motion. Strength: Strength testing is 5/5 in all muscle groups tested. Reflexes: Reflexes are symmetrically 2+ at the patellar and ankle tendons. Clonus absent bilaterally at the feet. Special Tests: Straight leg raise and crossed SLR negative. Leg length and pelvis level. Additional Examinations: Negative Trendelenburg test.            Diagnostics:  Radiology:       Pertinent imaging was obtained, interpreted, and reviewed with the patient today, images only - no report available. Lumbar x-ray:    AP and Lateral views were obtained and reviewed of the lumbar spine. Impression: Normal radiographic exam of the thoracic spine    Lumbar spine MRI on 6/3/2021  1. No significant spinal canal stenosis.    2. Neural foraminal narrowing at L4-L5 and L5-S1.   3. Disc desiccation with slight loss of disc space height at L5-S1. Office Procedures:  Orders Placed This Encounter   Procedures    XR THORACIC SPINE (2 VIEWS)     Standing Status:   Future     Number of Occurrences:   1     Standing Expiration Date:   9/5/2022       Assessment: 60-year-old female with thoracolumbar pain    Plan: Pertinent imaging was reviewed. The etiology, natural history, and treatment options for the disorder were discussed. The roles of activity medication, antiinflammatories, injections, bracing, physical therapy, and surgical interventions were all described to the patient and questions were answered. Patient has a history of lumbar foraminal narrowing with recent lower thoracic upper lumbar pain. I believe she is experiencing paraspinal spasm. At this time she is a candidate for a prednisone taper, muscle relaxer, and a follow-up with one of our spine specialists. She would like to proceed with this. Shaye Thorne is in agreement with this plan. All questions were answered to patient's satisfaction and was encouraged to call with any further questions. Total time spent for evaluation, education, and development of treatment plan: 35 minutes    Marika Coker, [address] [place] [address]  8/5/2022    This dictation was performed with a verbal recognition program [facility] ) and it was checked for errors. It is possible that there are still dictated errors within this office note. If so, please bring any areas to my attention for an addendum. All efforts were made to ensure that this office note is accurate.

## 2022-08-09 ENCOUNTER — OFFICE VISIT (OUTPATIENT)
Dept: ORTHOPEDIC SURGERY | Age: 52
End: 2022-08-09
Payer: COMMERCIAL

## 2022-08-09 VITALS — BODY MASS INDEX: 32.33 KG/M2 | HEIGHT: 67 IN | WEIGHT: 206 LBS

## 2022-08-09 DIAGNOSIS — M47.814 THORACIC SPONDYLOSIS: Primary | ICD-10-CM

## 2022-08-09 PROCEDURE — 99214 OFFICE O/P EST MOD 30 MIN: CPT | Performed by: PHYSICIAN ASSISTANT

## 2022-08-09 PROCEDURE — 1036F TOBACCO NON-USER: CPT | Performed by: PHYSICIAN ASSISTANT

## 2022-08-09 PROCEDURE — 3017F COLORECTAL CA SCREEN DOC REV: CPT | Performed by: PHYSICIAN ASSISTANT

## 2022-08-09 PROCEDURE — G8427 DOCREV CUR MEDS BY ELIG CLIN: HCPCS | Performed by: PHYSICIAN ASSISTANT

## 2022-08-09 PROCEDURE — G8417 CALC BMI ABV UP PARAM F/U: HCPCS | Performed by: PHYSICIAN ASSISTANT

## 2022-08-09 RX ORDER — PREDNISONE 1 MG/1
TABLET ORAL
Qty: 20 TABLET | Refills: 0 | Status: SHIPPED | OUTPATIENT
Start: 2022-08-09

## 2022-08-09 NOTE — LETTER
[facility]  [address] [address]  Phone: [phone]  Fax: [phone]    Shree Talley PA-C        August 9, 2022     Patient: Venessa Gusman   YOB: 1970   Date of Visit: 8/9/2022       To Whom It May Concern: It is my medical opinion that Leartis Clubs should not push, pull or lift anything > 20 pounds until re-eval in 3 weeks. If you have any questions or concerns, please don't hesitate to call.     Sincerely,        Shree Talley PA-C

## 2022-08-09 NOTE — PROGRESS NOTES
Date:  2022    Name:  Salma Young  Address:  [address] [place] [address]    :  1970      Age:   49 y.o.    SSN:  [id number]      Medical Record Number:  [id number]    Reason for Visit:    Chief Complaint    Back Pain (lumbar)      DOS:2022     HPI: Abbi Vogt is a 49 y.o. female here today for evaluation of thoracic spine. Patient states that approximately 6 days ago she began experiencing thoracic spine pain mainly to the left inferior to her left scapula. She denies any precipitating event other than sleeping on her couch for the last few weeks. She denies any radicular symptoms into her upper or lower extremities and denies any paresthesias into her upper or lower extremities. She denies any progressive weakness. Pain is mostly noted with overhead activity and with reaching. She denies any bowel or bladder dysfunction. Patient was seen in the after-hours clinic last week where she was evaluated and given a muscle relaxant and a steroid taper. She has not picked up the steroid taper yet but she has been on the muscle relaxant and her current pain in her thoracic spine is 3/10. Of note she has seen Vaughn Mcgill and Dr. Frida Bailey for her lumbar spine. She has been diagnosed with lumbar radiculopathy, foraminal stenosis of the lumbar region and has been treated with Neurontin and a muscle relaxer. She was doing okay overall however recently she has had the new complaint of thoracic pain. Patient experiences pain in her mid back associated with a sensation of tightness. Denies any recent injury to this limb. Denies any new radiculopathy. Pain Assessment  Location of Pain: Back  Severity of Pain: 3  Quality of Pain: Other (Comment)  Duration of Pain: Other (Comment)  ROS: Review of systems reviewed from Patient History Form completed today and available in the patient's chart under the Media tab.        Past Medical History:   Diagnosis Date    Anxiety     Chronic back pain     Headache(784.0)     Hx of blood clots     2016-was on xarelto for 6months    Obstructive apnea     Seizures ([address]     states no longer  has them-last one was in -        Past Surgical History:   Procedure Laterality Date    EPIDURAL STEROID INJECTION N/A 9/10/2019    MIDLINE LUMBAR FIVE SACRAL ONE EPIDURAL STEROID INJECTION SITE CONFIRMED BY FLUOROSCOPY performed by Antonio Hines MD at [facility] Right 2003    RIGHT KNEE    KNEE SURGERY Left 2012    LASIK Bilateral 2004    PAIN MANAGEMENT PROCEDURE Bilateral 2021    BILATERAL L3,L4 MEDIAL BRANCH BLOCKS WITH FLUOROSCOPY performed by Eladia Valdes MD at [facility] [address] (CERVIX NOT REMOVED)  11.4.16    Dr. Selby Simmonds History   Problem Relation Age of Onset    Heart Disease Father     Other Father         LILIAN    Diabetes Father     Alcohol Abuse Father     Obesity Father     Alzheimer's Disease Other         Grandparents    Breast Cancer Other         Aunt    Lung Cancer Other         Grandma    Colon Cancer Other         Grandpa    Diabetes Other         Grandma    Seizures Sister         Marcelina Claude, son       Social History     Socioeconomic History    Marital status: Single     Spouse name: None    Number of children: None    Years of education: None    Highest education level: None   Occupational History    Occupation: Housekeeping      Employer: [address] [place] [address]: on [employer]   Tobacco Use    Smoking status: Former     Years: 1.00     Types: Cigarettes     Quit date: 1987     Years since quittin.7    Smokeless tobacco: Never    Tobacco comments:     counseled on tobacco exposure avoidance   Vaping Use    Vaping Use: Never used   Substance and Sexual Activity    Alcohol use: No     Alcohol/week: 0.0 standard drinks    Drug use: No    Sexual activity: Never   Social History Narrative    19: Patient currently using two [employer] food [employer] and [employer] food pantry as needed for food shortages. She has also used financial assistance resources at her [employer], she is not eligable again for 6 months. [facility] will send via e-mail per patients request list of other local organizations that may offer food and financial assistance. Patient has also reached out to life matters and she attended one counseling session but has not rescheduled. ACM encouraged patient to continue counseling services with [employer]; ACM sent [employer] Brochure and contact number; encouraged her to reach out for other resources as well. Current Outpatient Medications   Medication Sig Dispense Refill    predniSONE (DELTASONE) 10 MG tablet Take 1 tablet by mouth in the morning. Days 1-3: 4 tabs daily; Days 4-6: 3 tabs daily; Days 7-9 2 tabs daily; Days 10-12 1 tab daily. Total #30 10mg tabs. 30 tablet 0    tiZANidine (ZANAFLEX) 2 MG tablet Take 1 tablet by mouth every 8 hours as needed (as needed for spasm) 10 tablet 0    Multiple Vitamins-Minerals (MULTIVITAMIN ADULT PO) Take 1 tablet by mouth daily       No current facility-administered medications for this visit. Allergies   Allergen Reactions    Latex Rash    Bee Pollen Swelling    Lime Flavor [Flavoring Agent] Swelling     Swelling of mouth       Vital signs:  Ht 5' 7.01\" (1.702 m)   Wt 206 lb (93.4 kg)   LMP 11/02/2016   BMI 32.26 kg/m²    Thoracic examination:    Gait & station: normal, patient ambulates without assistance    Inspection: Local inspection shows no step-off or bruising. Lumbar alignment is normal.    Skin: There are no rashes, ulcerations or lesions. Palpation: Diffuse tenderness present over the left mid thoracic spine at the level of her inferior scapular spine. There is mild diffuse tenderness at midline. Range of Motion: No limitation of motion but range of motion is with mild pain. Strength: Strength testing is 5/5 in all muscle groups tested.     Reflexes: Reflexes are symmetrically 2+ at the patellar and ankle tendons. Clonus absent bilaterally at the feet. Special Tests: Straight leg raise and crossed SLR negative. Leg length and pelvis level. Additional Examinations: Negative Trendelenburg test.            Diagnostics:  Radiology:    X-rays: 2 views of the thoracic spine that were obtained on 8/5/2022 was reviewed with the patient today independently which shows mild thoracic scoliosis with well-maintained vertebral heights. There is mild degenerative changes noted from T8-T6. No acute or subacute fractures noted. Lumbar spine MRI on 6/3/2021  1. No significant spinal canal stenosis. 2. Neural foraminal narrowing at L4-L5 and L5-S1.   3. Disc desiccation with slight loss of disc space height at L5-S1. Office Procedures:  No orders of the defined types were placed in this encounter. Assessment: 64-year-old female with thoracolumbar pain    Plan: Pertinent imaging was reviewed. The etiology, natural history, and treatment options for the disorder were discussed. The roles of activity medication, antiinflammatories, injections, bracing, physical therapy, and surgical interventions were all described to the patient and questions were answered. Patient has a history of lumbar foraminal narrowing with recent lower thoracic upper lumbar pain. Patient is doing well with her muscle relaxant and I have asked her to  the steroid taper and begin using it. I have also given her home exercise program for thoracic flexibility and progressive strengthening exercises. If she finds that she has had no durable benefit from these conservative treatment she will contact the office for referral to outpatient physical therapy. Tj He is in agreement with this plan. All questions were answered to patient's satisfaction and was encouraged to call with any further questions.      Total time spent for evaluation, education, and development of treatment plan: 30 minutes    Ramos Gerardo PA-C  Board certified by the [facility]     This dictation was performed with a verbal recognition program [facility]) and it was checked for errors. It is possible that there are still dictated errors within this office note. If so, please bring any areas to my attention for an addendum. All efforts were made to ensure that this office note is accurate.

## 2022-08-26 NOTE — PROGRESS NOTES
Patient admitted to [facility] & placed on appropriate monitors. Cart low, locked with siderails up. Call light within reach. Patient sitting up in bed taking PO. Family called bedside. Initiated Episode with an Established Patient who has not had a related appointment within my department in the past 7 days or scheduled within the next 24 hours.     Total Time: minutes: 11-20 minutes    Note: not billable if this call serves to triage the patient into an appointment for the relevant concern      Buster Mujica

## 2022-09-13 ENCOUNTER — TELEPHONE (OUTPATIENT)
Dept: ORTHOPEDIC SURGERY | Age: 52
End: 2022-09-13

## 2022-09-13 NOTE — TELEPHONE ENCOUNTER
Appointment Request     Patient requesting earlier appointment: Yes  Appointment offered to patient: 9/28  Patient Contact Number:  [phone]    IS WILLING TO GO TO [facility] BUT PREFERS [facility]. PT HAD RECENT INCIDENT THAT MADE BACK PAIN EVEN WORSE AND REQ TO SEE SOMEONE SOONER. ALREADY WENT TO [facility] AND WAS TOLD THAT BONES ARE FINE AND ISSUE IS MORE MUSCULAR. PT WANTS TO DISCUSS POSSIBLY GETTING MRI. CONTACT PT TO ADVISE OF SOONER APPT DATE.

## 2022-09-13 NOTE — TELEPHONE ENCOUNTER
Route to \[facility]\"". She has recently been to the back clinic, do you want her to follow up with them?

## 2022-09-26 ENCOUNTER — TELEPHONE (OUTPATIENT)
Dept: ORTHOPEDIC SURGERY | Age: 52
End: 2022-09-26

## 2022-09-26 NOTE — TELEPHONE ENCOUNTER
Appointment Request     Patient requesting earlier appointment: Yes  Appointment offered to patient: yes  Patient Contact Number: [phone]    Patient is req appt at the [facility] location only for appt. Did have to cx for 9/28.       Please Advise

## 2022-10-13 ENCOUNTER — OFFICE VISIT (OUTPATIENT)
Dept: ORTHOPEDIC SURGERY | Age: 52
End: 2022-10-13
Payer: COMMERCIAL

## 2022-10-13 VITALS — WEIGHT: 206 LBS | BODY MASS INDEX: 32.33 KG/M2 | HEIGHT: 67 IN

## 2022-10-13 DIAGNOSIS — M54.50 LOW BACK PAIN, UNSPECIFIED BACK PAIN LATERALITY, UNSPECIFIED CHRONICITY, UNSPECIFIED WHETHER SCIATICA PRESENT: ICD-10-CM

## 2022-10-13 DIAGNOSIS — M51.36 DISC DEGENERATION, LUMBAR: Primary | ICD-10-CM

## 2022-10-13 PROCEDURE — G8484 FLU IMMUNIZE NO ADMIN: HCPCS | Performed by: PHYSICIAN ASSISTANT

## 2022-10-13 PROCEDURE — 3017F COLORECTAL CA SCREEN DOC REV: CPT | Performed by: PHYSICIAN ASSISTANT

## 2022-10-13 PROCEDURE — G8427 DOCREV CUR MEDS BY ELIG CLIN: HCPCS | Performed by: PHYSICIAN ASSISTANT

## 2022-10-13 PROCEDURE — 1036F TOBACCO NON-USER: CPT | Performed by: PHYSICIAN ASSISTANT

## 2022-10-13 PROCEDURE — 99213 OFFICE O/P EST LOW 20 MIN: CPT | Performed by: PHYSICIAN ASSISTANT

## 2022-10-13 PROCEDURE — G8417 CALC BMI ABV UP PARAM F/U: HCPCS | Performed by: PHYSICIAN ASSISTANT

## 2022-10-13 NOTE — PROGRESS NOTES
Date:  10/13/2022    Name:  Saintclair Learn  Address:  [address] [place] [id number]    :  1970      Age:   49 y.o.    SSN:  [id number]      Medical Record Number:  [id number]    Reason for Visit:    Chief Complaint    Back Pain (lumbar)      DOS:10/13/2022     HPI: Eunice Chavez is a 49 y.o. female here today for evaluation of lumbar spine pain. Patient states that she has had a recent flare of lower back pain which was significant but states that over the past few days her pain has been a 2/10. She denies any radicular symptoms into either lower extremity and denies any progressive weakness or paresthesias. She states that her current back pain is 2/10. Dhruv Petit Patient states that approximately 2 months ago she began experiencing thoracic spine pain mainly to the left inferior to her left scapula. She denies any precipitating event other than sleeping on her couch for the last few weeks. She denies any radicular symptoms into her upper or lower extremities and denies any paresthesias into her upper or lower extremities. She denies any progressive weakness. Pain is mostly noted with overhead activity and with reaching. She denies any bowel or bladder dysfunction. Patient was seen in the after-hours clinic last week where she was evaluated and given a muscle relaxant and a steroid taper. She has not picked up the steroid taper yet but she has been on the muscle relaxant and her current pain in her thoracic spine is 3/10. Of note she has seen Alejandra Suarez and Dr. Adalgisa Alvarez for her lumbar spine. She has been diagnosed with lumbar radiculopathy, foraminal stenosis of the lumbar region and has been treated with Neurontin and a muscle relaxer. She was doing okay overall however recently she has had the new complaint of thoracic pain. Patient experiences pain in her mid back associated with a sensation of tightness. Denies any recent injury to this limb.   Denies any new radiculopathy. Pain Assessment  Location of Pain: Back  Severity of Pain: 2  Quality of Pain: Other (Comment)  Duration of Pain: Other (Comment)  Frequency of Pain: Other (Comment)]       ROS: Review of systems reviewed from Patient History Form completed today and available in the patient's chart under the Media tab.        Past Medical History:   Diagnosis Date    Anxiety     Chronic back pain     Headache(784.0)     Hx of blood clots     2016-was on xarelto for 6months    Obstructive apnea     Seizures ([address]     states no longer  has them-last one was in -        Past Surgical History:   Procedure Laterality Date    EPIDURAL STEROID INJECTION N/A 9/10/2019    MIDLINE LUMBAR FIVE SACRAL ONE EPIDURAL STEROID INJECTION SITE CONFIRMED BY FLUOROSCOPY performed by Jennifer Freed MD at [facility] Right     RIGHT KNEE    KNEE SURGERY Left 2012    LASIK Bilateral 2004    PAIN MANAGEMENT PROCEDURE Bilateral 2021    BILATERAL L3,L4 MEDIAL BRANCH BLOCKS WITH FLUOROSCOPY performed by Zi Bryant MD at [facility] [address] (CERVIX NOT REMOVED)  11.4.16    Dr. Porter Archibald History   Problem Relation Age of Onset    Heart Disease Father     Other Father         LILIAN    Diabetes Father     Alcohol Abuse Father     Obesity Father     Alzheimer's Disease Other         Grandparents    Breast Cancer Other         Aunt    Lung Cancer Other         Grandma    Colon Cancer Other         Grandpa    Diabetes Other         Grandma    Seizures Sister         Uncle, son       Social History     Socioeconomic History    Marital status: Single   Occupational History    Occupation: Housekeeping      Employer: [address] [place] [address]: on [employer]   Tobacco Use    Smoking status: Former     Years: 1.00     Types: Cigarettes     Quit date: 1987     Years since quittin.9    Smokeless tobacco: Never    Tobacco comments:     counseled on tobacco exposure avoidance   Vaping Use    Vaping Use: Never used   Substance and Sexual Activity    Alcohol use: No     Alcohol/week: 0.0 standard drinks    Drug use: No    Sexual activity: Never   Social History Narrative    9/12/19: Patient currently using two [employer] food panteries and [employer] pantry as needed for food shortages. She has also used financial assistance resources at her [employer], she is not eligable again for 6 months. [facility] will send via e-mail per patients request list of other local organizations that may offer food and financial assistance. Patient has also reached out to life matters and she attended one counseling session but has not rescheduled. ACM encouraged patient to continue counseling services with [employer]; [facility] sent [employer] Brochure and contact number; encouraged her to reach out for other resources as well. Current Outpatient Medications   Medication Sig Dispense Refill    predniSONE (DELTASONE) 5 MG tablet TAKE 1 TABLET BY MOUTH TWICE A DAY FOR 10 DAYS 20 tablet 0    predniSONE (DELTASONE) 10 MG tablet Take 1 tablet by mouth in the morning. Days 1-3: 4 tabs daily; Days 4-6: 3 tabs daily; Days 7-9 2 tabs daily; Days 10-12 1 tab daily. Total #30 10mg tabs. 30 tablet 0    tiZANidine (ZANAFLEX) 2 MG tablet Take 1 tablet by mouth every 8 hours as needed (as needed for spasm) 10 tablet 0    Multiple Vitamins-Minerals (MULTIVITAMIN ADULT PO) Take 1 tablet by mouth daily       No current facility-administered medications for this visit. Allergies   Allergen Reactions    Latex Rash    Bee Pollen Swelling    Lime Flavor [Flavoring Agent] Swelling     Swelling of mouth       Vital signs:  [facility] 11/02/2016    Lumbar spine examination:    Gait & station: normal, patient ambulates without assistance    Inspection: Local inspection shows no step-off or bruising. Lumbar alignment is normal.    Skin: There are no rashes, ulcerations or lesions.     Palpation: Diffuse tenderness present over the midline at the level of her waist   There is mild diffuse tenderness at midline. Range of Motion: No limitation of motion but range of motion is with mild pain. Strength: Strength testing is 5/5 in all muscle groups tested. Reflexes: Reflexes are symmetrically 2+ at the patellar and ankle tendons. Clonus absent bilaterally at the feet. Special Tests: Straight leg raise and crossed SLR negative. Leg length and pelvis level. Additional Examinations: Negative Trendelenburg test.            Diagnostics:  Radiology:    X-rays: 2 views of the thoracic spine that were obtained on 8/5/2022 was reviewed with the patient today independently which shows mild thoracic scoliosis with well-maintained vertebral heights. There is mild degenerative changes noted from T8-T6. No acute or subacute fractures noted. Lumbar spine MRI on 6/3/2021  1. No significant spinal canal stenosis. 2. Neural foraminal narrowing at L4-L5 and L5-S1.   3. Disc desiccation with slight loss of disc space height at L5-S1. Office Procedures:  No orders of the defined types were placed in this encounter. Assessment: 27-year-old female with thoracolumbar pain    Plan: Pertinent imaging was reviewed. The etiology, natural history, and treatment options for the disorder were discussed. The roles of activity medication, antiinflammatories, injections, bracing, physical therapy, and surgical interventions were all described to the patient and questions were answered. Patient has a history of lumbar foraminal narrowing with recent lumbar flare of pain. I have also given her home exercise program for thoracic flexibility and progressive strengthening exercises. Patient was also given information on turmeric as and over-the-counter nutritional supplement. if she finds that she has had no durable benefit from these conservative treatment she will contact the office for lumbar MRI.   Ny Young Worrell is in agreement with this plan. All questions were answered to patient's satisfaction and was encouraged to call with any further questions. Total time spent for evaluation, education, and development of treatment plan: 21 minutes    Cale Prince PA-C  Board certified by the [facility] [facility]     This dictation was performed with a verbal recognition program [facility]) and it was checked for errors. It is possible that there are still dictated errors within this office note. If so, please bring any areas to my attention for an addendum. All efforts were made to ensure that this office note is accurate.
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
## 2023-03-02 ENCOUNTER — HOSPITAL ENCOUNTER (OUTPATIENT)
Dept: MAMMOGRAPHY | Age: 53
Discharge: HOME | End: 2023-03-02
Payer: COMMERCIAL

## 2023-03-02 VITALS — HEIGHT: 66 IN | WEIGHT: 205 LBS | BODY MASS INDEX: 32.95 KG/M2

## 2023-03-02 DIAGNOSIS — Z12.31 VISIT FOR SCREENING MAMMOGRAM: ICD-10-CM

## 2023-03-02 PROCEDURE — 77067 SCR MAMMO BI INCL CAD: CPT

## 2023-04-18 ENCOUNTER — OFFICE VISIT (OUTPATIENT)
Dept: ORTHOPEDIC SURGERY | Age: 53
End: 2023-04-18
Payer: COMMERCIAL

## 2023-04-18 VITALS — BODY MASS INDEX: 33.11 KG/M2 | WEIGHT: 206 LBS | HEIGHT: 66 IN

## 2023-04-18 DIAGNOSIS — M47.814 THORACIC SPONDYLOSIS: Primary | ICD-10-CM

## 2023-04-18 PROCEDURE — G8417 CALC BMI ABV UP PARAM F/U: HCPCS | Performed by: PHYSICIAN ASSISTANT

## 2023-04-18 PROCEDURE — G8427 DOCREV CUR MEDS BY ELIG CLIN: HCPCS | Performed by: PHYSICIAN ASSISTANT

## 2023-04-18 PROCEDURE — 3017F COLORECTAL CA SCREEN DOC REV: CPT | Performed by: PHYSICIAN ASSISTANT

## 2023-04-18 PROCEDURE — 1036F TOBACCO NON-USER: CPT | Performed by: PHYSICIAN ASSISTANT

## 2023-04-18 PROCEDURE — 99213 OFFICE O/P EST LOW 20 MIN: CPT | Performed by: PHYSICIAN ASSISTANT

## 2023-04-18 NOTE — PROGRESS NOTES
Date:  2023    Name:  Barbara Granados  Address:  [address] [place] [address]    :  1970      Age:   47 y.o.    SSN:  [id number]      Medical Record Number:  [id number]    Reason for Visit:    Chief Complaint    Back Pain (thoracic)        DOS:2023     HPI: Paola Minaya is a 47 y.o. female here today for evaluation of thoracic spine pain. Patient states that she has had a recent flare of thoracic back pain which began after missing the last step on a set of stairs. She states that she did initially have increased pain but now it is a dull achy pain which she feels like her \"spine is out of alignment\". She had been seeing a chiropractor in the past with some benefit. She denies any radiation of pain and denies any pain that radiates into her upper or lower extremities. She has taken over-the-counter ibuprofen with some benefit. .  She denies any radicular symptoms into either lower extremity and denies any progressive weakness or paresthesias. She states that her current back pain is 7/10. Genna Espinoza Patient states that approximately 2 months ago she began experiencing thoracic spine pain mainly to the left inferior to her left scapula. She denies any precipitating event other than sleeping on her couch for the last few weeks. She denies any radicular symptoms into her upper or lower extremities and denies any paresthesias into her upper or lower extremities. She denies any progressive weakness. Pain is mostly noted with overhead activity and with reaching. She denies any bowel or bladder dysfunction. Patient was seen in the after-hours clinic last week where she was evaluated and given a muscle relaxant and a steroid taper. She has not picked up the steroid taper yet but she has been on the muscle relaxant and her current pain in her thoracic spine is 3/10. Of note she has seen Adam Castro and Dr. Roger Mccoy for her lumbar spine.   She has been diagnosed with lumbar

## 2023-05-02 ENCOUNTER — HOSPITAL ENCOUNTER (OUTPATIENT)
Dept: PHYSICAL THERAPY | Age: 53
Setting detail: THERAPIES SERIES
Discharge: HOME | End: 2023-05-02

## 2023-05-02 NOTE — PROGRESS NOTES
[facility] and [address],  Sports Performance and Rehabilitation, [facility] [address]  [address] [facility],5Th Floor   [place], Richa Garcia  Phone: [phone]  Fax: [phone]      Physical Therapy  Cancellation/No-show Note  Patient Name:  Noam Bill  :  1970   Date:  2023  Cancelled visits to date: 1  No-shows to date: 0    For today's appointment patient:  [x]  Cancelled  []  Rescheduled appointment  []  No-show     Reason given by patient:  []  Patient ill  []  Conflicting appointment   []  No transportation    []  Conflict with work  []  No reason given  []  Other:     Comments:      Electronically signed by:  Selvin Rick PT, PT

## 2023-05-15 ENCOUNTER — HOSPITAL ENCOUNTER (OUTPATIENT)
Dept: PHYSICAL THERAPY | Age: 53
Setting detail: THERAPIES SERIES
Discharge: HOME | End: 2023-05-15

## 2023-05-15 NOTE — FLOWSHEET NOTE
[facility] and [address],  Sports Performance and Rehabilitation, Count includes the [facility] [address]  793 [facility],5Th Floor   Richa Juárez  Phone: [phone]  Fax: [phone]      Physical Therapy  Cancellation/No-show Note  Patient Name:  Aj Merino  :  1970   Date:  5/15/2023  Cancelled visits to date: 1  No-shows to date: 1 (Evaluation)    For today's appointment patient:  []  Cancelled  []  Rescheduled appointment  [x]  No-show     Reason given by patient:  []  Patient ill  []  Conflicting appointment   []  No transportation    []  Conflict with work  []  No reason given  [x]  Other:     Comments:  Patient did not show for PT initial evaluation appointment    Electronically signed by:  Jewels Rosales PT, OMT-C

## 2023-05-23 ENCOUNTER — TELEMEDICINE (OUTPATIENT)
Dept: PULMONOLOGY | Age: 53
End: 2023-05-23
Payer: COMMERCIAL

## 2023-05-23 DIAGNOSIS — G47.33 OBSTRUCTIVE APNEA: Primary | Chronic | ICD-10-CM

## 2023-05-23 DIAGNOSIS — E66.9 NON MORBID OBESITY, UNSPECIFIED OBESITY TYPE: Chronic | ICD-10-CM

## 2023-05-23 PROCEDURE — 3017F COLORECTAL CA SCREEN DOC REV: CPT | Performed by: NURSE PRACTITIONER

## 2023-05-23 PROCEDURE — G8427 DOCREV CUR MEDS BY ELIG CLIN: HCPCS | Performed by: NURSE PRACTITIONER

## 2023-05-23 PROCEDURE — 99214 OFFICE O/P EST MOD 30 MIN: CPT | Performed by: NURSE PRACTITIONER

## 2023-05-23 ASSESSMENT — SLEEP AND FATIGUE QUESTIONNAIRES
HOW LIKELY ARE YOU TO NOD OFF OR FALL ASLEEP WHEN YOU ARE A PASSENGER IN A CAR FOR AN HOUR WITHOUT A BREAK: 3
HOW LIKELY ARE YOU TO NOD OFF OR FALL ASLEEP WHILE SITTING QUIETLY AFTER LUNCH WITHOUT ALCOHOL: 3
HOW LIKELY ARE YOU TO NOD OFF OR FALL ASLEEP WHILE SITTING AND TALKING TO SOMEONE: 0
HOW LIKELY ARE YOU TO NOD OFF OR FALL ASLEEP WHILE SITTING AND TALKING TO SOMEONE: 3
HOW LIKELY ARE YOU TO NOD OFF OR FALL ASLEEP WHILE SITTING AND READING: 3
ESS TOTAL SCORE: 22
HOW LIKELY ARE YOU TO NOD OFF OR FALL ASLEEP WHILE WATCHING TV: 3
HOW LIKELY ARE YOU TO NOD OFF OR FALL ASLEEP WHEN YOU ARE A PASSENGER IN A CAR FOR AN HOUR WITHOUT A BREAK: 3
HOW LIKELY ARE YOU TO NOD OFF OR FALL ASLEEP WHILE SITTING INACTIVE IN A PUBLIC PLACE: 3
HOW LIKELY ARE YOU TO NOD OFF OR FALL ASLEEP WHILE LYING DOWN TO REST IN THE AFTERNOON WHEN CIRCUMSTANCES PERMIT: 3
HOW LIKELY ARE YOU TO NOD OFF OR FALL ASLEEP WHILE LYING DOWN TO REST IN THE AFTERNOON WHEN CIRCUMSTANCES PERMIT: 3
ESS TOTAL SCORE: 18
HOW LIKELY ARE YOU TO NOD OFF OR FALL ASLEEP WHILE WATCHING TV: 3
HOW LIKELY ARE YOU TO NOD OFF OR FALL ASLEEP IN A CAR, WHILE STOPPED FOR A FEW MINUTES IN TRAFFIC: 0
HOW LIKELY ARE YOU TO NOD OFF OR FALL ASLEEP IN A CAR, WHILE STOPPED FOR A FEW MINUTES IN TRAFFIC: 1
HOW LIKELY ARE YOU TO NOD OFF OR FALL ASLEEP WHILE SITTING AND READING: 3
HOW LIKELY ARE YOU TO NOD OFF OR FALL ASLEEP WHILE SITTING QUIETLY AFTER LUNCH WITHOUT ALCOHOL: 3
HOW LIKELY ARE YOU TO NOD OFF OR FALL ASLEEP WHILE SITTING INACTIVE IN A PUBLIC PLACE: 3

## 2023-05-23 NOTE — PROGRESS NOTES
Saint Priest MD Zigmund Golder CNP  Eryn Madalyn ESPINOSA [address] 200 [facility], [address] [facility]- [phone]   [address] [facility]  [address], [address] [place], [place] [address] [phone]     Video Visit- Follow up      Assessment/Plan:      1. Obstructive apnea  Assessment & Plan:  Chronic-Stable: Reviewed and analyzed results of physiologic download from patient's machine and reviewed with patient. Supplies and parts as needed for her machine. These are medically necessary. Limit caffeine use after 3pm. Based on the analyzed data will continue with current settings. Discussed we could trial a pressure increase to see if this would help with her sleep and daytime symptoms but she declines at this time and feels symptoms are more related to stress and fatigue. Encouraged her to work on returning to a normal routine and also encouraged her to follow up with her PCP to discuss anxiety/depression. Discussed no driving when sleepy. Will see her back in 3 months. Encouraged her to contact the office with any questions or concerns. 2. Non morbid obesity, unspecified obesity type  Assessment & Plan:   Chronic-not stable:  Discussed importance of treating obstructive sleep apnea and getting sufficient sleep to assist with weight control. Encouraged her to work on weight loss through diet and exercise. Recommended DASH or Mediterranean diets. Reviewed, analyzed, and documented physiologic data from patient's PAP machine. This information was analyzed to assess complexity and medical decision making in regards to further testing and management. The primary encounter diagnosis was Obstructive apnea. A diagnosis of Non morbid obesity, unspecified obesity type was also pertinent to this visit. The chronic medical conditions listed are directly related to the primary diagnosis listed above. The management of the primary diagnosis affects the secondary diagnosis and vice versa.

## 2023-05-23 NOTE — PROGRESS NOTES
Diagnosis: [x] LILIAN (G47.33) [] CSA (G47.31) [] Apnea (G47.30)   Length of Need: [x] 15 Months [] 99 Months [] Other:   Machine (NAVID!): [] Respironics Dream Station      Auto [] ResMed AirSense     Auto [] Other:     []  CPAP () [] Bilevel ()   Mode: [] Auto [] Spontaneous    Mode: [] Auto [] Spontaneous            Comfort Settings:      Humidifier: [] Heated ()        [x] Water chamber replacement ()/ 1 per 6 months        Mask:   [x] Nasal () /1 per 3 months [] Full Face () /1 per 3 months   [x] Patient choice -Size and fit mask [] Patient Choice - Size and fit mask   [] Dispense: [] Dispense:   [x] Headgear () / 1 per 3 months [] Headgear () / 1 per 3 months   [x] Replacement Nasal Cushion ()/2 per month [] Interface Replacement ()/1 per month   [] Replacement Nasal Pillows ()/2 per month         Tubing: [x] Heated ()/1 per 3 months    [] Standard ()/1 per 3 months [] Other:           Filters: [x] Non-disposable ()/1 per 6 months     [x] Ultra-Fine, Disposable ()/2 per month        Miscellaneous: [] Chin Strap ()/ 1 per 6 months [] O2 bleed-in:        LPM   [] Oxymetry on CPAP/Bilevel []  Other:         Start Order Date: 05/23/23    MEDICAL JUSTIFICATION:  I, the undersigned, certify that the above prescribed supplies are medically necessary for this patients wellbeing. In my opinion, the supplies are both reasonable and necessary in reference to accepted standards of medicalpractice in treatment of this patients condition. Rosalind Mcdermott NP    NPI: [id number]       Order Signed Date: 05/23/23  [address] [facility]  Pulmonary, Sleep, and Critical Care    Pulmonary, Sleep, and Critical Care  [facility] [address] [place] [address].  [facility], [facility] , [facility]  Phone: [phone]    Fax:

## 2023-05-23 NOTE — ASSESSMENT & PLAN NOTE
Chronic-Stable: Reviewed and analyzed results of physiologic download from patient's machine and reviewed with patient. Supplies and parts as needed for her machine. These are medically necessary. Limit caffeine use after 3pm. Based on the analyzed data will continue with current settings. Discussed we could trial a pressure increase to see if this would help with her sleep and daytime symptoms but she declines at this time and feels symptoms are more related to stress and fatigue. Encouraged her to work on returning to a normal routine and also encouraged her to follow up with her PCP to discuss anxiety/depression. Discussed no driving when sleepy. Will see her back in 3 months. Encouraged her to contact the office with any questions or concerns.
Chronic-not stable:  Discussed importance of treating obstructive sleep apnea and getting sufficient sleep to assist with weight control. Encouraged her to work on weight loss through diet and exercise. Recommended DASH or Mediterranean diets.
None

## 2023-08-23 NOTE — PROGRESS NOTES
[email or web address]  1970  [address] [place] [address]  [phone] (home)   [phone] (mobile)      Insurance Info (confirm with patient if correct):  Payer/Plan Subscr  Sex Relation Sub.  Ins. ID Effective Group Num

## 2023-09-20 ENCOUNTER — TELEPHONE (OUTPATIENT)
Dept: ORTHOPEDIC SURGERY | Age: 53
End: 2023-09-20

## 2024-08-21 ENCOUNTER — TELEMEDICINE (OUTPATIENT)
Dept: PULMONOLOGY | Age: 54
End: 2024-08-21

## 2024-08-21 VITALS — WEIGHT: 204 LBS | BODY MASS INDEX: 32.02 KG/M2 | HEIGHT: 67 IN

## 2024-08-21 DIAGNOSIS — E66.9 NON MORBID OBESITY, UNSPECIFIED OBESITY TYPE: Chronic | ICD-10-CM

## 2024-08-21 DIAGNOSIS — G47.33 OBSTRUCTIVE APNEA: Primary | Chronic | ICD-10-CM

## 2024-08-21 ASSESSMENT — SLEEP AND FATIGUE QUESTIONNAIRES
HOW LIKELY ARE YOU TO NOD OFF OR FALL ASLEEP WHILE LYING DOWN TO REST IN THE AFTERNOON WHEN CIRCUMSTANCES PERMIT: SLIGHT CHANCE OF DOZING
HOW LIKELY ARE YOU TO NOD OFF OR FALL ASLEEP IN A CAR, WHILE STOPPED FOR A FEW MINUTES IN TRAFFIC: WOULD NEVER DOZE
HOW LIKELY ARE YOU TO NOD OFF OR FALL ASLEEP WHILE SITTING INACTIVE IN A PUBLIC PLACE: HIGH CHANCE OF DOZING
HOW LIKELY ARE YOU TO NOD OFF OR FALL ASLEEP WHEN YOU ARE A PASSENGER IN A CAR FOR AN HOUR WITHOUT A BREAK: WOULD NEVER DOZE
HOW LIKELY ARE YOU TO NOD OFF OR FALL ASLEEP WHILE WATCHING TV: SLIGHT CHANCE OF DOZING
HOW LIKELY ARE YOU TO NOD OFF OR FALL ASLEEP WHILE SITTING AND TALKING TO SOMEONE: WOULD NEVER DOZE
HOW LIKELY ARE YOU TO NOD OFF OR FALL ASLEEP WHILE SITTING QUIETLY AFTER LUNCH WITHOUT ALCOHOL: WOULD NEVER DOZE
ESS TOTAL SCORE: 6
HOW LIKELY ARE YOU TO NOD OFF OR FALL ASLEEP WHILE SITTING AND READING: SLIGHT CHANCE OF DOZING

## 2024-08-21 NOTE — PROGRESS NOTES
Ny Spring         : 1970    Diagnosis: [x] Hypoxia (R09.02) [] CSA (G47.31) [x] Apnea (G47.30)   Length of Need: [] 13 Months [] 99 Months [] Other:    Machine (NAVID!): [] Respironics Dream Station      Auto [] ResMed AirSense     Auto [] Other:     []  CPAP () [] Bilevel ()   Mode: [] Auto [] Spontaneous    Mode: [] Auto [] Spontaneous              Comfort Settings:        Humidifier: [] Heated ()        [] Water chamber replacement ()/ 1 per 6 months        Mask:   [] Nasal () /1 per 3 months [] Full Face () /1 per 3 months   [] Patient choice -Size and fit mask [] Patient Choice - Size and fit mask   [] Dispense:  [] Dispense:    [] Headgear () / 1 per 3 months [] Headgear () / 1 per 3 months   [] Replacement Nasal Cushion ()/2 per month [] Interface Replacement ()/1 per month   [] Replacement Nasal Pillows ()/2 per month         Tubing: [] Heated ()/1 per 3 months    [] Standard ()/1 per 3 months [] Other:           Filters: [] Non-disposable ()/1 per 6 months     [] Ultra-Fine, Disposable ()/2 per month        Miscellaneous: [] Chin Strap ()/ 1 per 6 months [] O2 bleed-in:       LPM   [x] Overnight Oximetry on CPAP/Bilevel []  Other:    [] Modem: ()         Start Order Date: 24    MEDICAL JUSTIFICATION:  I, the undersigned, certify that the above prescribed supplies are medically necessary for this patient’s wellbeing.  In my opinion, the supplies are both reasonable and necessary in reference to accepted standards of medicalpractice in treatment of this patient’s condition.    AMARILIS Jimenez      NPI: [id number]       Order Signed Date: 24    Electronically signed by AMARILIS Jimenez on 2024 at 1:13 PM    Ny Spring  1970  [address] [facility] [address]  [phone] (home)   [phone] (mobile)      Insurance Info (confirm with patient if correct):  Payer/Plan Subscr  Sex

## 2024-08-21 NOTE — PROGRESS NOTES
hrs/night  % of nights >= 4 hrs: 89 %  Download AHI (/hour): 0.6 /HR  Average CPAP Pressure : 13.2 cmH2O      APAP - Settings  Pressure Min: 12 cmH2O  Pressure Max: 20 cmH2O                   PAP Mask  Mask Type: Nasal mask     [place] presents today for follow-up for sleep apnea. she reports she is doing well with her machine.  The pressure on her machine is comfortable and she is waking rested. She does usually wake with a headache.  she denies congestion, nosebleeds, dryness, aerophagia, or drowsiness while driving. her mask is comfortable and is fitting well.    [facility] Walk-in/[facility]    [place] Sleepiness Score: 6    Current Outpatient Medications   Medication Instructions    COLLAGEN PO 1 tablet, Oral, DAILY      N[place], was evaluated through a synchronous (real-time) audio-video encounter. The patient (or guardian if applicable) is aware that this is a billable service, which includes applicable co-pays. This Virtual Visit was conducted with patient's (and/or legal guardian's) consent. Patient identification was verified, and a caregiver was present when appropriate.   The patient was located at Other: [place], [place]  Provider was located at Facility (Appt Dept): [address] [facility]  [address]  [place] [address]  Confirm you are appropriately licensed, registered, or certified to deliver care in the state where the patient is located as indicated above. If you are not or unsure, please re-schedule the visit: Yes, I confirm.     Electronically signed by AMARILIS Jimenez on 8/21/2024 at 1:19 PM

## 2024-08-21 NOTE — PROGRESS NOTES
Diagnosis: [x] LILIAN (G47.33) [] CSA (G47.31) [] Apnea (G47.30)   Length of Need: [x] 15 Months [] 99 Months [] Other:   Machine (NAVID!): [] Respironics Dream Station      Auto [] ResMed AirSense     Auto [] Other:     []  CPAP () [] Bilevel ()   Mode: [] Auto [] Spontaneous    Mode: [] Auto [] Spontaneous            Comfort Settings:      Humidifier: [] Heated ()        [x] Water chamber replacement ()/ 1 per 6 months        Mask:   [x] Nasal () /1 per 3 months [] Full Face () /1 per 3 months   [x] Patient choice -Size and fit mask [] Patient Choice - Size and fit mask   [] Dispense: [] Dispense:   [x] Headgear () / 1 per 3 months [] Headgear () / 1 per 3 months   [x] Replacement Nasal Cushion ()/2 per month [] Interface Replacement ()/1 per month   [] Replacement Nasal Pillows ()/2 per month         Tubing: [x] Heated ()/1 per 3 months    [] Standard ()/1 per 3 months [] Other:           Filters: [x] Non-disposable ()/1 per 6 months     [x] Ultra-Fine, Disposable ()/2 per month        Miscellaneous: [] Chin Strap ()/ 1 per 6 months [] O2 bleed-in:        LPM   [] Oxymetry on CPAP/Bilevel []  Other:         Start Order Date: 08/21/24    MEDICAL JUSTIFICATION:  I, the undersigned, certify that the above prescribed supplies are medically necessary for this patient’s wellbeing.  In my opinion, the supplies are both reasonable and necessary in reference to accepted standards of medicalpractice in treatment of this patient’s condition.    UNRULY ABREU NP    NPI: [id number]       Order Signed Date: 08/21/24  [facility]  Pulmonary, Sleep, and Critical Care    Pulmonary, Sleep, and Critical Care  [facility] [address]                          [address] [place] [address] [address]  [place] [address]                                    [place] [address]  Phone: [phone]    Fax:

## 2024-08-21 NOTE — ASSESSMENT & PLAN NOTE
Chronic-Stable: Reviewed and analyzed results of physiologic download from patient's machine and reviewed with patient.  Supplies and parts as needed for her machine.  These are medically necessary.  Limit caffeine use after 3pm. Based on the analyzed data will continue with current settings. Stable on her machine at current settings, getting benefit from the use, and having minimal side effects. Will place order for overnight oximetry due to headaches and hypoxia shown on HST. Will see her back in 1 year. Encouraged her to contact the office with any questions or concerns.

## 2024-09-03 ENCOUNTER — TELEPHONE (OUTPATIENT)
Dept: PULMONOLOGY | Age: 54
End: 2024-09-03

## 2024-09-03 NOTE — TELEPHONE ENCOUNTER
If she feels like headaches have resolved and is doing well no need to repeat. If not, would recommend repeating and wearing the entire night.

## 2024-11-19 ENCOUNTER — PATIENT MESSAGE (OUTPATIENT)
Dept: PULMONOLOGY | Age: 54
End: 2024-11-19

## 2024-12-03 SDOH — HEALTH STABILITY: PHYSICAL HEALTH: ON AVERAGE, HOW MANY MINUTES DO YOU ENGAGE IN EXERCISE AT THIS LEVEL?: 30 MIN

## 2024-12-03 SDOH — HEALTH STABILITY: PHYSICAL HEALTH: ON AVERAGE, HOW MANY DAYS PER WEEK DO YOU ENGAGE IN MODERATE TO STRENUOUS EXERCISE (LIKE A BRISK WALK)?: 4 DAYS

## 2024-12-06 ENCOUNTER — OFFICE VISIT (OUTPATIENT)
Dept: ORTHOPEDIC SURGERY | Age: 54
End: 2024-12-06

## 2024-12-06 VITALS — BODY MASS INDEX: 32.02 KG/M2 | HEIGHT: 67 IN | WEIGHT: 204 LBS

## 2024-12-06 DIAGNOSIS — M22.2X2 PATELLOFEMORAL SYNDROME OF LEFT KNEE: ICD-10-CM

## 2024-12-06 DIAGNOSIS — R52 PAIN: Primary | ICD-10-CM

## 2024-12-06 RX ORDER — MELOXICAM 15 MG/1
15 TABLET ORAL DAILY PRN
Qty: 90 TABLET | Refills: 1 | Status: SHIPPED | OUTPATIENT
Start: 2024-12-06

## 2024-12-06 NOTE — PROGRESS NOTES
Smoking status: Former     Current packs/day: 0.00     Average packs/day: 20.0 packs/day for 1 year (20.0 ttl pk-yrs)     Types: Cigarettes     Start date: 1986     Quit date: 1987     Years since quittin.1    Smokeless tobacco: Never    Tobacco comments:     counseled on tobacco exposure avoidance   Substance Use Topics    Alcohol use: No     Alcohol/week: 0.0 standard drinks of alcohol      Current Outpatient Medications on File Prior to Visit   Medication Sig Dispense Refill    COLLAGEN PO Take 1 tablet by mouth daily       No current facility-administered medications on file prior to visit.      ASCVD 10-YEAR RISK SCORE  The ASCVD Risk score (Dolores DK, et al., 2019) failed to calculate for the following reasons:    The systolic blood pressure is missing    Cannot find a previous HDL lab    Cannot find a previous total cholesterol lab     Review of Systems  10-point ROS is negative other than HPI.    Physical Exam  Based off 1997 Exam Criteria  Ht 1.702 m (5' 7.01\")   Wt 92.5 kg (204 lb)   LMP 2016   BMI 31.94 kg/m²      Constitutional:       General: He is not in acute distress.     Appearance: Normal appearance.   Cardiovascular:      Rate and Rhythm: Normal rate and regular rhythm.      Pulses: Normal pulses.   Pulmonary:      Effort: Pulmonary effort is normal. No respiratory distress.   Neurological:      Mental Status: He is alert and oriented to person, place, and time. Mental status is at baseline.   Skin: Mean, dry, intact.  No open sores  Lymphatics: No palpable lymph nodes    Musculoskeletal:  Gait:  altered  Lumbar spine: There is no swelling, warmth, or erythema. Range of motion is within normal limits. There is no paraspinal or spinous process tenderness. . The distal neurovascular exam is grossly intact and symmetric.  Adriano Hip: Examination of the right and left hip reveals intact skin. The patient demonstrates full painless range of motion with regards to flexion,

## (undated) DEVICE — GAUZE,SPONGE,4"X4",16PLY,STRL,LF,10/TRAY: Brand: MEDLINE

## (undated) DEVICE — CHLORAPREP 26ML ORANGE

## (undated) DEVICE — HYPODERMIC SAFETY NEEDLE: Brand: MAGELLAN

## (undated) DEVICE — STERILE LATEX POWDER-FREE SURGICAL GLOVESWITH NITRILE COATING: Brand: PROTEXIS

## (undated) DEVICE — AVANOS* EXTENSION SETS: Brand: EXTENSION SET, MINI BORE, 12" LENGTH, 0.50ML VOLUME 25

## (undated) DEVICE — MEDIA CONTRAST RX ISOVUE-300 61% 30ML VIALS

## (undated) DEVICE — 6 ML SYRINGE LUER-LOCK TIP: Brand: MONOJECT

## (undated) DEVICE — UNIVERSAL BLOCK TRAY: Brand: MEDLINE INDUSTRIES, INC.

## (undated) DEVICE — TOWEL OR BLUEE 16X26IN ST 8 PACK ORB08 16X26ORTWL

## (undated) DEVICE — STERILE POLYISOPRENE POWDER-FREE SURGICAL GLOVES: Brand: PROTEXIS

## (undated) DEVICE — ALCOHOL RUBBING 16OZ 70% ISO

## (undated) DEVICE — NEEDLE SPNL 22GA L3.5IN BLK HUB S STL REG WALL FIT STYL W/

## (undated) DEVICE — PORT VLV 2 W NDL FREE SMRTSITE

## (undated) DEVICE — APPLICATOR MEDICATED 3 CC SOLUTION CLR STRL CHLORAPREP